# Patient Record
Sex: MALE | Race: WHITE | Employment: OTHER | RURAL
[De-identification: names, ages, dates, MRNs, and addresses within clinical notes are randomized per-mention and may not be internally consistent; named-entity substitution may affect disease eponyms.]

---

## 2019-12-11 ENCOUNTER — OFFICE VISIT (OUTPATIENT)
Dept: FAMILY MEDICINE CLINIC | Age: 54
End: 2019-12-11

## 2019-12-11 VITALS
HEIGHT: 63 IN | SYSTOLIC BLOOD PRESSURE: 144 MMHG | BODY MASS INDEX: 28.49 KG/M2 | DIASTOLIC BLOOD PRESSURE: 78 MMHG | HEART RATE: 77 BPM | WEIGHT: 160.8 LBS | RESPIRATION RATE: 20 BRPM | TEMPERATURE: 98.6 F | OXYGEN SATURATION: 98 %

## 2019-12-11 DIAGNOSIS — I10 ESSENTIAL HYPERTENSION: ICD-10-CM

## 2019-12-11 DIAGNOSIS — E78.00 HYPERCHOLESTEROLEMIA: ICD-10-CM

## 2019-12-11 DIAGNOSIS — H69.81 DYSFUNCTION OF RIGHT EUSTACHIAN TUBE: ICD-10-CM

## 2019-12-11 DIAGNOSIS — I25.10 CORONARY ARTERY DISEASE INVOLVING NATIVE CORONARY ARTERY OF NATIVE HEART, ANGINA PRESENCE UNSPECIFIED: ICD-10-CM

## 2019-12-11 DIAGNOSIS — E11.69 TYPE 2 DIABETES MELLITUS WITH OTHER SPECIFIED COMPLICATION, WITHOUT LONG-TERM CURRENT USE OF INSULIN (HCC): Primary | ICD-10-CM

## 2019-12-11 DIAGNOSIS — Z79.899 ENCOUNTER FOR LONG-TERM CURRENT USE OF MEDICATION: ICD-10-CM

## 2019-12-11 DIAGNOSIS — Z12.11 SCREEN FOR COLON CANCER: ICD-10-CM

## 2019-12-11 DIAGNOSIS — Z11.59 ENCOUNTER FOR HEPATITIS C SCREENING TEST FOR LOW RISK PATIENT: ICD-10-CM

## 2019-12-11 PROBLEM — E11.9 DIABETES (HCC): Status: ACTIVE | Noted: 2019-12-11

## 2019-12-11 RX ORDER — ISOSORBIDE MONONITRATE 30 MG/1
TABLET, EXTENDED RELEASE ORAL
Qty: 90 TAB | Refills: 1 | Status: SHIPPED | OUTPATIENT
Start: 2019-12-11 | End: 2020-06-17

## 2019-12-11 RX ORDER — GLIPIZIDE 5 MG/1
TABLET, FILM COATED, EXTENDED RELEASE ORAL
Refills: 0 | COMMUNITY
Start: 2019-09-16 | End: 2019-12-11 | Stop reason: SDUPTHER

## 2019-12-11 RX ORDER — SIMVASTATIN 80 MG/1
TABLET, FILM COATED ORAL
Refills: 0 | COMMUNITY
Start: 2019-09-16 | End: 2019-12-11 | Stop reason: SDUPTHER

## 2019-12-11 RX ORDER — GLIPIZIDE 5 MG/1
TABLET, FILM COATED, EXTENDED RELEASE ORAL
Qty: 360 TAB | Refills: 1 | Status: SHIPPED | OUTPATIENT
Start: 2019-12-11 | End: 2020-07-29 | Stop reason: SDUPTHER

## 2019-12-11 RX ORDER — CLOPIDOGREL BISULFATE 75 MG/1
TABLET ORAL
Qty: 90 TAB | Refills: 3 | Status: SHIPPED | OUTPATIENT
Start: 2019-12-11 | End: 2020-12-21

## 2019-12-11 RX ORDER — ASPIRIN 81 MG/1
TABLET ORAL DAILY
COMMUNITY
End: 2022-04-21 | Stop reason: SDUPTHER

## 2019-12-11 RX ORDER — METOPROLOL SUCCINATE 50 MG/1
TABLET, EXTENDED RELEASE ORAL
Refills: 0 | COMMUNITY
Start: 2019-11-12 | End: 2020-02-10 | Stop reason: SDUPTHER

## 2019-12-11 RX ORDER — METFORMIN HYDROCHLORIDE 500 MG/1
TABLET ORAL
Refills: 0 | COMMUNITY
Start: 2019-09-16 | End: 2019-12-11 | Stop reason: SDUPTHER

## 2019-12-11 RX ORDER — BISMUTH SUBSALICYLATE 262 MG
1 TABLET,CHEWABLE ORAL DAILY
COMMUNITY

## 2019-12-11 RX ORDER — LOSARTAN POTASSIUM AND HYDROCHLOROTHIAZIDE 25; 100 MG/1; MG/1
TABLET ORAL
Refills: 0 | COMMUNITY
Start: 2019-09-16 | End: 2019-12-11 | Stop reason: SDUPTHER

## 2019-12-11 RX ORDER — CLOPIDOGREL BISULFATE 75 MG/1
TABLET ORAL
Refills: 0 | COMMUNITY
Start: 2019-09-19 | End: 2019-12-11 | Stop reason: SDUPTHER

## 2019-12-11 RX ORDER — LOSARTAN POTASSIUM AND HYDROCHLOROTHIAZIDE 25; 100 MG/1; MG/1
TABLET ORAL
Qty: 90 TAB | Refills: 1 | Status: SHIPPED | OUTPATIENT
Start: 2019-12-11 | End: 2020-03-11 | Stop reason: RX

## 2019-12-11 RX ORDER — METFORMIN HYDROCHLORIDE 500 MG/1
TABLET ORAL
Qty: 360 TAB | Refills: 1 | Status: SHIPPED | OUTPATIENT
Start: 2019-12-11 | End: 2020-07-29 | Stop reason: SDUPTHER

## 2019-12-11 RX ORDER — ISOSORBIDE MONONITRATE 30 MG/1
TABLET, EXTENDED RELEASE ORAL
Refills: 0 | COMMUNITY
Start: 2019-09-16 | End: 2019-12-11 | Stop reason: SDUPTHER

## 2019-12-11 RX ORDER — SIMVASTATIN 40 MG/1
40 TABLET, FILM COATED ORAL
Qty: 90 TAB | Refills: 1 | Status: SHIPPED | OUTPATIENT
Start: 2019-12-11 | End: 2020-01-14

## 2019-12-11 NOTE — PROGRESS NOTES
Subjective:     Wei Yang is a 62 y.o. male NEW PATIENT seen for follow up of diabetes. Moved from [facility]. Retired from [employer]. He also has hypertension, hyperlipidemia, coronary artery disease, obesity and excess alcohol use. Diabetic Review of Systems - medication compliance: compliant most of the time, diabetic diet compliance: compliant most of the time, last eye exam approximately March 2019. Other symptoms and concerns: he is due for labs and med refills. His previous PCP recommended trial Tradjenta but pt is not currently taking due to high cost.  He occasional episodes CP. Had heart cath done in May. Coronary lesions too small to put stent in. Cardiology increased dose of Metoprolol instead. He needs to establish with Cardiology here. Patient Active Problem List    Diagnosis Date Noted    Diabetes ([facility] [address] 12/11/2019    Hypertension 12/11/2019    Hypercholesterolemia 12/11/2019    Coronary artery disease involving native coronary artery of native heart 12/11/2019     Current Outpatient Medications   Medication Sig Dispense Refill    metoprolol succinate (TOPROL-XL) 50 mg XL tablet TAKE 1 AND 1/2 TABLETS BY MOUTH TWICE A DAY  0    aspirin delayed-release 81 mg tablet Take  by mouth daily.  fish oil-omega-3 fatty acids (FISH OIL) 340-1,000 mg capsule Take 1 Cap by mouth daily.  multivitamin (ONE A DAY) tablet Take 1 Tab by mouth daily.  losartan-hydroCHLOROthiazide (HYZAAR) 100-25 mg per tablet TAKE 1 TABLET BY MOUTH EVERY DAY IN THE MORNING  Indications: high blood pressure 90 Tab 1    metFORMIN (GLUCOPHAGE) 500 mg tablet TAKE 2 TABLET BY MOUTH 2 TIMES EVERY DAY WITH MORNING AND EVENING MEALS 360 Tab 1    glipiZIDE SR (GLUCOTROL XL) 5 mg CR tablet TAKE 2 TABLET BY MOUTH 2 TIMES EVERY DAY WITH BREAKFAST AND DINNER  Indications: type 2 diabetes mellitus 360 Tab 1    simvastatin (ZOCOR) 40 mg tablet Take 1 Tab by mouth nightly.  Indications: high cholesterol 90 Tab 1    clopidogrel (PLAVIX) 75 mg tab TAKE 1 TABLET BY MOUTH EVERY DAY  Indications: treatment to prevent a heart attack 90 Tab 3    isosorbide mononitrate ER (IMDUR) 30 mg tablet TAKE 1 TABLET BY MOUTH EVERY DAY IN THE MORNING 90 Tab 1    linaGLIPtin (TRADJENTA) 5 mg tablet Take 1 Tab by mouth daily. Indications: type 2 diabetes mellitus 90 Tab 1     Allergies   Allergen Reactions    Lisinopril Cough     Past Medical History:   Diagnosis Date    Diabetes ([address]     Hypercholesterolemia     Hypertension      Past Surgical History:   Procedure Laterality Date    CABG, ARTERY-VEIN, THREE  2013    HX CORONARY ARTERY BYPASS GRAFT  08/28/2013    HX HEART CATHETERIZATION  05/2019    HX HEENT Left 1990's    eye vitrectomy    HX VITRECTOMY Left 1997     Family History   Problem Relation Age of Onset    Diabetes Father     Diabetes Sister      Social History     Tobacco Use    Smoking status: Never Smoker    Smokeless tobacco: Never Used   Substance Use Topics    Alcohol use: Yes     Alcohol/week: 15.0 standard drinks     Types: 15 Cans of beer per week     Frequency: 4 or more times a week     Drinks per session: 3 or 4     Binge frequency: Less than monthly     Comment: beer or liquor             Review of Systems  Pertinent items are noted in HPI. R ear feels clogged. Some rhinitis. Objective:     Visit Vitals  /78 (BP 1 Location: Right arm, BP Patient Position: Sitting) Comment: manual   Pulse 77   Temp 98.6 °F (37 °C) (Oral)   Resp 20   Ht 5' 2.5\" (1.588 m)   Wt 160 lb 12.8 oz (72.9 kg)   SpO2 98%   BMI 28.94 kg/m²     Appearance: alert, well appearing, and in no distress and overweight. Exam:   Physical Exam  Vitals signs reviewed. Constitutional:       Appearance: He is obese. HENT:      Head: Normocephalic.       Right Ear: Tympanic membrane and ear canal normal.      Left Ear: Tympanic membrane and ear canal normal.      Nose: Nose normal.      Mouth/Throat: Mouth: Mucous membranes are moist.      Pharynx: Oropharynx is clear. Eyes:      Extraocular Movements: Extraocular movements intact. Neck:      Musculoskeletal: Normal range of motion. Cardiovascular:      Rate and Rhythm: Normal rate and regular rhythm. Pulmonary:      Effort: Pulmonary effort is normal.      Breath sounds: Normal breath sounds. Abdominal:      Palpations: Abdomen is soft. Tenderness: There is no tenderness. Neurological:      Mental Status: He is alert. Diabetic foot exam:     Left Foot:   Visual Exam: normal    Pulse DP: 2+ (normal)   Filament test: normal sensation          Right Foot:   Visual Exam: normal    Pulse DP: 2+ (normal)   Filament test: normal sensation          Lab review: orders written for new lab studies as appropriate; see orders. Assessment/Plan:     .  Diabetic issues reviewed with him: annual eye examinations at Ophthalmology discussed and glycohemoglobin and other lab monitoring discussed. ICD-10-CM ICD-9-CM    1. Type 2 diabetes mellitus with other specified complication, without long-term current use of insulin (HCC) E11.69 250.80 metFORMIN (GLUCOPHAGE) 500 mg tablet      glipiZIDE SR (GLUCOTROL XL) 5 mg CR tablet      linaGLIPtin (TRADJENTA) 5 mg tablet      HEMOGLOBIN A1C WITH EAG      THYROID CASCADE PROFILE      MICROALBUMIN, UR, RAND W/ MICROALB/CREAT RATIO      HM DIABETES FOOT EXAM   2. Coronary artery disease involving native coronary artery of native heart, angina presence unspecified I25.10 414.01 REFERRAL TO CARDIOLOGY      clopidogrel (PLAVIX) 75 mg tab      isosorbide mononitrate ER (IMDUR) 30 mg tablet   3. Hypercholesterolemia E78.00 272.0 simvastatin (ZOCOR) 40 mg tablet      LIPID PANEL   4. Encounter for long-term current use of medication Z79.899 V58.69 CBC WITH AUTOMATED DIFF      METABOLIC PANEL, COMPREHENSIVE   5.  Screen for colon cancer Z12.11 V76.51 REFERRAL TO GASTROENTEROLOGY   6. Essential hypertension I10 401.9 losartan-hydroCHLOROthiazide (HYZAAR) 100-25 mg per tablet   7. Encounter for hepatitis C screening test for low risk patient Z11.59 V73.89 HEPATITIS C AB   8. Dysfunction of right eustachian tube H69.81 381.81 REFERRAL TO ENT-OTOLARYNGOLOGY     Discussed the patient's BMI with him. The BMI follow up plan is as follows:     dietary management education, guidance, and counseling  encourage exercise  monitor weight  prescribed dietary intake    An After Visit Summary was printed and given to the patient. Request medical records from last PCP. He will return for fasting labs. Refer to Cardiology, GI, ENT. Med refills ordered. If A1C high, consider Victoza or add basal insulin. Discuss decrease alcohol intake. Recommend to get Pneumovax 23.

## 2019-12-11 NOTE — PROGRESS NOTES
Identified pt with two pt identifiers(name and ). Chief Complaint   Patient presents with    New Patient     just moved here from [facility] Maintenance Due   Topic    Hepatitis C Screening     DTaP/Tdap/Td series (1 - Tdap)    Shingrix Vaccine Age 50> (1 of 2)    FOBT Q 1 YEAR AGE 50-75        Wt Readings from Last 3 Encounters:   19 160 lb 12.8 oz (72.9 kg)     Temp Readings from Last 3 Encounters:   19 98.6 °F (37 °C) (Oral)     BP Readings from Last 3 Encounters:   19 144/78     Pulse Readings from Last 3 Encounters:   19 77         Learning Assessment:  :     Learning Assessment 2019   PRIMARY LEARNER Patient   HIGHEST LEVEL OF EDUCATION - PRIMARY LEARNER  GRADUATED HIGH SCHOOL OR GED   BARRIERS PRIMARY LEARNER NONE   CO-LEARNER CAREGIVER No   PRIMARY LANGUAGE ENGLISH   LEARNER PREFERENCE PRIMARY READING     DEMONSTRATION   ANSWERED BY self   RELATIONSHIP SELF       Depression Screening:  :     3 most recent PHQ Screens 2019   Little interest or pleasure in doing things Not at all   Feeling down, depressed, irritable, or hopeless Not at all   Total Score PHQ 2 0       Fall Risk Assessment:  :     No flowsheet data found. Abuse Screening:  :     Abuse Screening Questionnaire 2019   Do you ever feel afraid of your partner? N   Are you in a relationship with someone who physically or mentally threatens you? N   Is it safe for you to go home? Y       Coordination of Care Questionnaire:  :     1) Have you been to an emergency room, urgent care clinic since your last visit? no   Hospitalized since your last visit? no             2) Have you seen or consulted any other health care providers outside of [address] [place] [address] since your last visit? yes  19 Dr Glo Morin (Include any pap smears or colon screenings in this section.)    3) Do you have an Advance Directive on file?  no  Are you interested in receiving information about Advance Directives? Yes paper work given and explained    Reviewed record in preparation for visit and have obtained necessary documentation. Medication reconciliation up to date and corrected with patient at this time.

## 2019-12-11 NOTE — PATIENT INSTRUCTIONS
Body Mass Index: Care Instructions Your Care Instructions Body mass index (BMI) can help you see if your weight is raising your risk for health problems. It uses a formula to compare how much you weigh with how tall you are. · A BMI lower than 18.5 is considered underweight. · A BMI between 18.5 and 24.9 is considered healthy. · A BMI between 25 and 29.9 is considered overweight. A BMI of 30 or higher is considered obese. If your BMI is in the normal range, it means that you have a lower risk for weight-related health problems. If your BMI is in the overweight or obese range, you may be at increased risk for weight-related health problems, such as high blood pressure, heart disease, stroke, arthritis or joint pain, and diabetes. If your BMI is in the underweight range, you may be at increased risk for health problems such as fatigue, lower protection (immunity) against illness, muscle loss, bone loss, hair loss, and hormone problems. BMI is just one measure of your risk for weight-related health problems. You may be at higher risk for health problems if you are not active, you eat an unhealthy diet, or you drink too much alcohol or use tobacco products. Follow-up care is a key part of your treatment and safety. Be sure to make and go to all appointments, and call your doctor if you are having problems. It's also a good idea to know your test results and keep a list of the medicines you take. How can you care for yourself at home? · Practice healthy eating habits. This includes eating plenty of fruits, vegetables, whole grains, lean protein, and low-fat dairy. · If your doctor recommends it, get more exercise. Walking is a good choice. Bit by bit, increase the amount you walk every day. Try for at least 30 minutes on most days of the week. · Do not smoke. Smoking can increase your risk for health problems.  If you need help quitting, talk to your doctor about stop-smoking programs and medicines. These can increase your chances of quitting for good. · Limit alcohol to 2 drinks a day for men and 1 drink a day for women. Too much alcohol can cause health problems. If you have a BMI higher than 25 · Your doctor may do other tests to check your risk for weight-related health problems. This may include measuring the distance around your waist. A waist measurement of more than 40 inches in men or 35 inches in women can increase the risk of weight-related health problems. · Talk with your doctor about steps you can take to stay healthy or improve your health. You may need to make lifestyle changes to lose weight and stay healthy, such as changing your diet and getting regular exercise. If you have a BMI lower than 18.5 · Your doctor may do other tests to check your risk for health problems. · Talk with your doctor about steps you can take to stay healthy or improve your health. You may need to make lifestyle changes to gain or maintain weight and stay healthy, such as getting more healthy foods in your diet and doing exercises to build muscle. Where can you learn more? Go to http://carolina-dionna.info/. Enter S176 in the search box to learn more about \"Body Mass Index: Care Instructions. \" Current as of: October 13, 2016 Content Version: 11.4 © 3264-3808 [employer], [employer]. Care instructions adapted under license by "[employer] (which disclaims liability or warranty for this information). If you have questions about a medical condition or this instruction, always ask your healthcare professional. [place] [address] any warranty or liability for your use of this information. Colon Cancer Screening: Care Instructions Your Care Instructions Colorectal cancer occurs in the colon or rectum. That's the lower part of your digestive system.  It is the second-leading cause of cancer deaths in the [place]. It often starts with small growths called polyps in the colon or rectum. Polyps are usually found with screening tests. Depending on the type of test, any polyps found may be removed during the tests. Colorectal cancer usually does not cause symptoms at first. But regular tests can help find it early, before it spreads and becomes harder to treat. Your risk for colorectal cancer gets higher as you get older. Some experts say that adults should start regular screening at age 48 and stop at age 76. Others say to start before age 48 or continue after age 76. Talk with your doctor about your risk and when to start and stop screening. You may have one of several tests. Follow-up care is a key part of your treatment and safety. Be sure to make and go to all appointments, and call your doctor if you are having problems. It's also a good idea to know your test results and keep a list of the medicines you take. What are the main screening tests for colon cancer? · Stool tests. These include the fecal immunochemical test (FIT) and the fecal occult blood test (FOBT). These tests check stool samples for signs of cancer. If your test is positive, you will need to have a colonoscopy. · Sigmoidoscopy. This test lets your doctor look at the lining of your rectum and the lowest part of your colon. Your doctor uses a lighted tube called a sigmoidoscope. This test can't find cancers or polyps in the upper part of your colon. In some cases, polyps that are found can be removed. But if your doctor finds polyps, you will need to have a colonoscopy to check the upper part of your colon. · Colonoscopy. This test lets your doctor look at the lining of your rectum and your entire colon. The doctor uses a thin, flexible tool called a colonoscope. It can also be used to remove polyps or get a tissue sample (biopsy). What tests do you need?  
The following guidelines are for adults who are not at high risk for colorectal cancer. You may have at least one of these tests as directed by your doctor. · Fecal immunochemical test (FIT) or guaiac fecal occult blood test (gFOBT) every year · Sigmoidoscopy every 5 years · Colonoscopy every 10 years If you are over age 76, you can work with your doctor to decide if screening is a good option. Talk with your doctor about when you need to be tested. And discuss which tests are right for you. Your doctor may recommend earlier or more frequent testing if you: 
· Have had colorectal cancer before. · Have had colon polyps. · Have symptoms of colorectal cancer. These include blood in your stool and changes in your bowel habits. · Have a parent, brother or sister, or child with colon polyps or colorectal cancer. · Have a bowel disease. This includes ulcerative colitis and Crohn's disease. · Have a rare polyp syndrome that runs in families, such as familial adenomatous polyposis (FAP). · Have had radiation treatments to the belly or pelvis. When should you call for help? Watch closely for changes in your health, and be sure to contact your doctor if: 
  · You have any changes in your bowel habits.  
  · You have any problems. Where can you learn more? Go to http://carolina-dionna.info/. Enter M541 in the search box to learn more about \"Colon Cancer Screening: Care Instructions. \" Current as of: December 19, 2018 Content Version: 12.2 © 7836-1922 [employer]. Care instructions adapted under license by [employer] (which disclaims liability or warranty for this information). If you have questions about a medical condition or this instruction, always ask your healthcare professional. [place] [address] any warranty or liability for your use of this information.

## 2019-12-11 NOTE — ASSESSMENT & PLAN NOTE
Type 2. Key Antihyperglycemic Medications             metFORMIN (GLUCOPHAGE) 500 mg tablet (Taking) TAKE 2 TABLET BY MOUTH 2 TIMES EVERY DAY WITH MORNING AND EVENING MEALS    glipiZIDE SR (GLUCOTROL XL) 5 mg CR tablet (Taking) TAKE 2 TABLET BY MOUTH 2 TIMES EVERY DAY WITH BREAKFAST AND DINNER        Other Key Diabetic Medications             losartan-hydroCHLOROthiazide (HYZAAR) 100-25 mg per tablet (Taking) TAKE 1 TABLET BY MOUTH EVERY DAY IN THE MORNING    simvastatin (ZOCOR) 80 mg tablet (Taking) TAKE 1/2 TABLET BY ORAL ROUTE EVERY DAY IN THE EVENING    fish oil-omega-3 fatty acids (FISH OIL) 340-1,000 mg capsule (Taking) Take 1 Cap by mouth daily.         No results found for: HBA1C, [id number], [id number], GLU, CREA, CREAPOC, CREATEXT, MALBUR, MCALPOCT, MCACRPOC, MALBCRRATEXT, MCACR, MCAU, MCAU2, MALBEXT, CHOL, CHOLPOCT, HDL, HDLPOC, LDLC, LDL, LDLCEXT, LDLCPOC, TRIGL, TGLPOCT, [id number]  No Diabetic HM Topics for this patient

## 2019-12-18 DIAGNOSIS — I10 ESSENTIAL HYPERTENSION: Primary | ICD-10-CM

## 2019-12-18 RX ORDER — HYDROCHLOROTHIAZIDE 25 MG/1
25 TABLET ORAL DAILY
Qty: 90 TAB | Refills: 0 | Status: SHIPPED | OUTPATIENT
Start: 2019-12-18 | End: 2020-03-11

## 2019-12-18 RX ORDER — LOSARTAN POTASSIUM 100 MG/1
100 TABLET ORAL DAILY
Qty: 90 TAB | Refills: 0 | Status: SHIPPED | OUTPATIENT
Start: 2019-12-18 | End: 2020-03-11

## 2019-12-19 ENCOUNTER — LAB ONLY (OUTPATIENT)
Dept: FAMILY MEDICINE CLINIC | Age: 54
End: 2019-12-19

## 2019-12-19 ENCOUNTER — HOSPITAL ENCOUNTER (OUTPATIENT)
Dept: LAB | Age: 54
Discharge: HOME | End: 2019-12-19

## 2019-12-19 DIAGNOSIS — Z79.899 ENCOUNTER FOR LONG-TERM CURRENT USE OF MEDICATION: ICD-10-CM

## 2019-12-19 DIAGNOSIS — E78.00 HYPERCHOLESTEROLEMIA: ICD-10-CM

## 2019-12-19 DIAGNOSIS — Z11.59 ENCOUNTER FOR HEPATITIS C SCREENING TEST FOR LOW RISK PATIENT: ICD-10-CM

## 2019-12-19 DIAGNOSIS — E11.69 TYPE 2 DIABETES MELLITUS WITH OTHER SPECIFIED COMPLICATION, WITHOUT LONG-TERM CURRENT USE OF INSULIN (HCC): ICD-10-CM

## 2019-12-19 LAB
ALBUMIN SERPL-MCNC: 3.8 G/DL (ref 3.5–5)
ALBUMIN/GLOB SERPL: 1.5 {RATIO} (ref 1.1–2.2)
ALP SERPL-CCNC: 66 U/L (ref 45–117)
ALT SERPL-CCNC: 22 U/L (ref 12–78)
ANION GAP SERPL CALC-SCNC: 6 MMOL/L (ref 5–15)
AST SERPL-CCNC: 10 U/L (ref 15–37)
BASOPHILS # BLD: 0 K/UL (ref 0–0.1)
BASOPHILS NFR BLD: 1 % (ref 0–1)
BILIRUB SERPL-MCNC: 0.8 MG/DL (ref 0.2–1)
BUN SERPL-MCNC: 21 MG/DL (ref 6–20)
BUN/CREAT SERPL: 22 (ref 12–20)
CALCIUM SERPL-MCNC: 9.7 MG/DL (ref 8.5–10.1)
CHLORIDE SERPL-SCNC: 104 MMOL/L (ref 97–108)
CHOLEST SERPL-MCNC: 160 MG/DL
CO2 SERPL-SCNC: 29 MMOL/L (ref 21–32)
CREAT SERPL-MCNC: 0.97 MG/DL (ref 0.7–1.3)
CREAT UR-MCNC: 224 MG/DL
DIFFERENTIAL METHOD BLD: ABNORMAL
EOSINOPHIL # BLD: 0.1 K/UL (ref 0–0.4)
EOSINOPHIL NFR BLD: 3 % (ref 0–7)
ERYTHROCYTE [DISTWIDTH] IN BLOOD BY AUTOMATED COUNT: 14.5 % (ref 11.5–14.5)
EST. AVERAGE GLUCOSE BLD GHB EST-MCNC: 154 MG/DL
GLOBULIN SER CALC-MCNC: 2.6 G/DL (ref 2–4)
GLUCOSE SERPL-MCNC: 181 MG/DL (ref 65–100)
HBA1C MFR BLD: 7 % (ref 4–5.6)
HCT VFR BLD AUTO: 39.8 % (ref 36.6–50.3)
HDLC SERPL-MCNC: 52 MG/DL
HDLC SERPL: 3.1 {RATIO} (ref 0–5)
HGB BLD-MCNC: 13.2 G/DL (ref 12.1–17)
IMM GRANULOCYTES # BLD AUTO: 0 K/UL (ref 0–0.04)
IMM GRANULOCYTES NFR BLD AUTO: 0 % (ref 0–0.5)
LDLC SERPL CALC-MCNC: 57.4 MG/DL (ref 0–100)
LIPID PROFILE,FLP: ABNORMAL
LYMPHOCYTES # BLD: 0.9 K/UL (ref 0.8–3.5)
LYMPHOCYTES NFR BLD: 20 % (ref 12–49)
MCH RBC QN AUTO: 30.2 PG (ref 26–34)
MCHC RBC AUTO-ENTMCNC: 33.2 G/DL (ref 30–36.5)
MCV RBC AUTO: 91.1 FL (ref 80–99)
MICROALBUMIN UR-MCNC: 436 MG/DL
MICROALBUMIN/CREAT UR-RTO: 1946 MG/G (ref 0–30)
MONOCYTES # BLD: 0.4 K/UL (ref 0–1)
MONOCYTES NFR BLD: 9 % (ref 5–13)
NEUTS SEG # BLD: 3.1 K/UL (ref 1.8–8)
NEUTS SEG NFR BLD: 67 % (ref 32–75)
NRBC # BLD: 0 K/UL (ref 0–0.01)
NRBC BLD-RTO: 0 PER 100 WBC
PLATELET # BLD AUTO: 130 K/UL (ref 150–400)
PMV BLD AUTO: 12.5 FL (ref 8.9–12.9)
POTASSIUM SERPL-SCNC: 4.6 MMOL/L (ref 3.5–5.1)
PROT SERPL-MCNC: 6.4 G/DL (ref 6.4–8.2)
RBC # BLD AUTO: 4.37 M/UL (ref 4.1–5.7)
SODIUM SERPL-SCNC: 139 MMOL/L (ref 136–145)
TRIGL SERPL-MCNC: 253 MG/DL (ref ?–150)
VLDLC SERPL CALC-MCNC: 50.6 MG/DL
WBC # BLD AUTO: 4.6 K/UL (ref 4.1–11.1)

## 2019-12-20 LAB
HCV AB SERPL QL IA: NONREACTIVE
HCV COMMENT,HCGAC: NORMAL

## 2019-12-21 LAB — TSH SERPL-ACNC: 2.81 UIU/ML (ref 0.45–4.5)

## 2019-12-27 ENCOUNTER — TELEPHONE (OUTPATIENT)
Dept: FAMILY MEDICINE CLINIC | Age: 54
End: 2019-12-27

## 2020-01-14 ENCOUNTER — OFFICE VISIT (OUTPATIENT)
Dept: CARDIOLOGY CLINIC | Age: 55
End: 2020-01-14

## 2020-01-14 VITALS
OXYGEN SATURATION: 98 % | RESPIRATION RATE: 16 BRPM | HEIGHT: 62 IN | SYSTOLIC BLOOD PRESSURE: 144 MMHG | WEIGHT: 165 LBS | BODY MASS INDEX: 30.36 KG/M2 | DIASTOLIC BLOOD PRESSURE: 88 MMHG | HEART RATE: 84 BPM

## 2020-01-14 DIAGNOSIS — I10 ESSENTIAL HYPERTENSION: ICD-10-CM

## 2020-01-14 DIAGNOSIS — I25.10 CORONARY ARTERY DISEASE INVOLVING NATIVE CORONARY ARTERY OF NATIVE HEART WITHOUT ANGINA PECTORIS: Primary | ICD-10-CM

## 2020-01-14 DIAGNOSIS — E78.00 HYPERCHOLESTEROLEMIA: ICD-10-CM

## 2020-01-14 RX ORDER — ROSUVASTATIN CALCIUM 20 MG/1
20 TABLET, COATED ORAL
Qty: 90 TAB | Refills: 3 | Status: SHIPPED | OUTPATIENT
Start: 2020-01-14 | End: 2021-01-29 | Stop reason: SDUPTHER

## 2020-01-14 NOTE — PROGRESS NOTES
Chief Complaint   Patient presents with    Other     cardia clearance     Visit Vitals  /88 (BP 1 Location: Left arm, BP Patient Position: Sitting)   Pulse 84   Resp 16   Ht 5' 2\" (1.575 m)   Wt 165 lb (74.8 kg)   SpO2 98%   BMI 30.18 kg/m²     Patient presents to office with no complaints of pain, SOB, dizziness, or chest pain. No refills needed. No recent hospitalizations reported. Patient here for cardia clearance for colonoscopy.    Peg Turner LPN

## 2020-01-14 NOTE — PROGRESS NOTES
Korina Ferrara MD. [facility]              Patient: Julia Ramires  : 1965      Today's Date: 2020              HISTORY OF PRESENT ILLNESS:     History of Present Illness:  Reason for consult: establish cardiac relationship      Dr. Klarissa Perdue noted 19 - He occasional episodes CP. Had heart cath done in May. Coronary lesions too small to put stent in. Cardiology increased dose of Metoprolol instead. He needs to establish with Cardiology here. He says he has history of CABG in . He saw Dr. Jerel Nicholas in [facility] - had a nuclear stress test and then a cath - had some small vessel blockages and treated medically. Chronic stable angina - class II - notices walking up hills sometimes. He has been stable cardiac wise. He has plans for colonoscopy soon. PAST MEDICAL HISTORY:     Past Medical History:   Diagnosis Date    CAD (coronary artery disease)     Diabetes ([address]     Hx of CABG     Aug 2013 at [place] in [place]     Hypercholesterolemia     Hypertension        Past Surgical History:   Procedure Laterality Date    CABG, ARTERY-VEIN, THREE      HX CORONARY ARTERY BYPASS GRAFT  2013    HX HEART CATHETERIZATION  2019    HX HEENT Left 's    eye vitrectomy    HX VITRECTOMY Left          MEDICATIONS:     Current Outpatient Medications   Medication Sig Dispense Refill    metoprolol succinate (TOPROL-XL) 50 mg XL tablet TAKE 1 AND 1/2 TABLETS BY MOUTH TWICE A DAY  0    aspirin delayed-release 81 mg tablet Take  by mouth daily.  fish oil-omega-3 fatty acids (FISH OIL) 340-1,000 mg capsule Take 1 Cap by mouth daily.  multivitamin (ONE A DAY) tablet Take 1 Tab by mouth daily.       losartan-hydroCHLOROthiazide (HYZAAR) 100-25 mg per tablet TAKE 1 TABLET BY MOUTH EVERY DAY IN THE MORNING  Indications: high blood pressure 90 Tab 1    metFORMIN (GLUCOPHAGE) 500 mg tablet TAKE 2 TABLET BY MOUTH 2 TIMES EVERY DAY WITH MORNING AND EVENING MEALS 360 Tab 1    glipiZIDE SR (GLUCOTROL XL) 5 mg CR tablet TAKE 2 TABLET BY MOUTH 2 TIMES EVERY DAY WITH BREAKFAST AND DINNER  Indications: type 2 diabetes mellitus 360 Tab 1    simvastatin (ZOCOR) 40 mg tablet Take 1 Tab by mouth nightly. Indications: high cholesterol 90 Tab 1    clopidogrel (PLAVIX) 75 mg tab TAKE 1 TABLET BY MOUTH EVERY DAY  Indications: treatment to prevent a heart attack 90 Tab 3    isosorbide mononitrate ER (IMDUR) 30 mg tablet TAKE 1 TABLET BY MOUTH EVERY DAY IN THE MORNING 90 Tab 1    losartan (COZAAR) 100 mg tablet Take 1 Tab by mouth daily. 90 Tab 0    hydroCHLOROthiazide (HYDRODIURIL) 25 mg tablet Take 1 Tab by mouth daily. 90 Tab 0    linaGLIPtin (TRADJENTA) 5 mg tablet Take 1 Tab by mouth daily. Indications: type 2 diabetes mellitus 90 Tab 1       Allergies   Allergen Reactions    Lisinopril Cough           SOCIAL HISTORY:     Social History     Tobacco Use    Smoking status: Never Smoker    Smokeless tobacco: Never Used   Substance Use Topics    Alcohol use: Yes     Alcohol/week: 15.0 standard drinks     Types: 15 Cans of beer per week     Frequency: 4 or more times a week     Drinks per session: 3 or 4     Binge frequency: Less than monthly     Comment: beer or liquor    Drug use: Never         FAMILY HISTORY:     Family History   Problem Relation Age of Onset    Diabetes Father     Diabetes Sister            REVIEW OF SYMPTOMS:     Review of Symptoms:  Constitutional: Negative for fever, chills  HEENT: Negative for nosebleeds, tinnitus, and vision changes. Respiratory: Negative for cough, wheezing  Cardiovascular: Negative for orthopnea, claudication, syncope, and PND. Gastrointestinal: Negative for abdominal pain, diarrhea, melena. Genitourinary: Negative for dysuria  Musculoskeletal: Negative for myalgias. Skin: Negative for rash  Heme: No problems bleeding. Neurological: Negative for speech change and focal weakness.          PHYSICAL EXAM:     Physical Exam:  Visit Vitals  /88 (BP 1 Location: Left arm, BP Patient Position: Sitting)   Pulse 84   Resp 16   Ht 5' 2\" (1.575 m)   Wt 165 lb (74.8 kg)   SpO2 98%   BMI 30.18 kg/m²     Patient appears generally well, mood and affect are appropriate and pleasant. HEENT:  Hearing intact, non-icteric, normocephalic, atraumatic. Neck Exam: Supple, No JVD or carotid bruits. Lung Exam: Clear to auscultation, even breath sounds. Cardiac Exam: Regular rate and rhythm with no murmur or rub  Abdomen: Soft, non-tender, normal bowel sounds. No bruits or masses. Extremities: Moves all ext well. No lower extremity edema. Vascular: 2+ dorsalis pedis pulses bilaterally. Psych: Appropriate affect  Neuro - Grossly intact      LABS / OTHER STUDIES:     Lab Results   Component Value Date/Time    Cholesterol, total 160 12/19/2019 10:54 AM    HDL Cholesterol 52 12/19/2019 10:54 AM    LDL, calculated 57.4 12/19/2019 10:54 AM    VLDL, calculated 50.6 12/19/2019 10:54 AM    Triglyceride 253 (H) 12/19/2019 10:54 AM    CHOL/HDL Ratio 3.1 12/19/2019 10:54 AM       Lab Results   Component Value Date/Time    Sodium 139 12/19/2019 10:54 AM    Potassium 4.6 12/19/2019 10:54 AM    Chloride 104 12/19/2019 10:54 AM    CO2 29 12/19/2019 10:54 AM    Anion gap 6 12/19/2019 10:54 AM    Glucose 181 (H) 12/19/2019 10:54 AM    BUN 21 (H) 12/19/2019 10:54 AM    Creatinine 0.97 12/19/2019 10:54 AM    BUN/Creatinine ratio 22 (H) 12/19/2019 10:54 AM    GFR est AA >60 12/19/2019 10:54 AM    GFR est non-AA >60 12/19/2019 10:54 AM    Calcium 9.7 12/19/2019 10:54 AM    Bilirubin, total 0.8 12/19/2019 10:54 AM    AST (SGOT) 10 (L) 12/19/2019 10:54 AM    Alk.  phosphatase 66 12/19/2019 10:54 AM    Protein, total 6.4 12/19/2019 10:54 AM    Albumin 3.8 12/19/2019 10:54 AM    Globulin 2.6 12/19/2019 10:54 AM    A-G Ratio 1.5 12/19/2019 10:54 AM    ALT (SGPT) 22 12/19/2019 10:54 AM     Lab Results   Component Value Date/Time    WBC 4.6 12/19/2019 10:54 AM    HGB 13.2 12/19/2019 10:54 AM    HCT 39.8 12/19/2019 10:54 AM    PLATELET 773 (L) 86/31/4723 10:54 AM    MCV 91.1 12/19/2019 10:54 AM     Lab Results   Component Value Date/Time    TSH 2.810 12/19/2019 10:54 AM     Lab Results   Component Value Date/Time    Hemoglobin A1c 7.0 (H) 12/19/2019 10:54 AM           CARDIAC DIAGNOSTICS:     Cardiac Evaluation Includes:    EKG 1/14/20 - NSR, normal         ASSESSMENT AND PLAN:     Assessment and Plan:    1) CAD / CABG  - He has history of CABG in 2013. He saw Dr. Desirae Servin in [address] in 2019 - says he had a nuclear stress test and then a cath - had some small vessel blockages and treated medically. - has done well without signficant chest pain lately - has chronic class 2 angina (pain walking up hills sometimes)   - Cont BB, statin, Imdur, ARB  - He is on DAPT - plavix started in May 2019 --> will get prior records and then likely stop plavix at next visit in 6 months     2) HTN   - BP mildly high in visits   - [place] < 130/80   - He prefers to follow BP at home and FU with Dr. Dickens Danger -- can add amlodipine if BP is high     3) Dyslipidemia  - prefer a more potent statin ---> switch Simvastatin to Crestor 20 mg daily     4) Colonoscopy   - He can proceed with procedure and should have low cardiac risk - can hold plavix 5 days priror to procedure if needed     5) See me in 6 months. Patient expressed understanding of the plan - questions were answered. Moved from [place] to [place] in 1984. Retired from [employer] 2019. Family in [facility] [place]. Single. [place]ia Benítez MD, [address], [facility] [address]. [address], [address]  Ph: [phone]   Ph [phone]      ADDENDUM   1/30/2020  Fax received from [facility] for clearance. Procedure: Colonoscopy/EGD  Dr: Darien Montemayor ?   Medication requested to hold: Plavix      He can proceed with his procedure and should have low cardiac risk. He can hold plavix 5 days prior to procedure and resume when safe.

## 2020-01-30 ENCOUNTER — TELEPHONE (OUTPATIENT)
Dept: CARDIOLOGY CLINIC | Age: 55
End: 2020-01-30

## 2020-01-30 NOTE — TELEPHONE ENCOUNTER
Fax received from [facility] for clearance. Procedure: Colonoscopy/EGD  Dr: Linda Perla ?   Medication requested to hold: Plavix    Fax to: [phone]

## 2020-02-10 RX ORDER — METOPROLOL SUCCINATE 50 MG/1
TABLET, EXTENDED RELEASE ORAL
Qty: 90 TAB | Refills: 5 | Status: SHIPPED | OUTPATIENT
Start: 2020-02-10 | End: 2020-07-29 | Stop reason: SDUPTHER

## 2020-03-11 DIAGNOSIS — I10 ESSENTIAL HYPERTENSION: ICD-10-CM

## 2020-03-11 RX ORDER — LOSARTAN POTASSIUM 100 MG/1
TABLET ORAL
Qty: 90 TAB | Refills: 0 | Status: SHIPPED | OUTPATIENT
Start: 2020-03-11 | End: 2020-07-29 | Stop reason: ALTCHOICE

## 2020-03-11 RX ORDER — HYDROCHLOROTHIAZIDE 25 MG/1
TABLET ORAL
Qty: 90 TAB | Refills: 0 | Status: SHIPPED | OUTPATIENT
Start: 2020-03-11 | End: 2020-07-29 | Stop reason: ALTCHOICE

## 2020-06-17 DIAGNOSIS — I25.10 CORONARY ARTERY DISEASE INVOLVING NATIVE CORONARY ARTERY OF NATIVE HEART, ANGINA PRESENCE UNSPECIFIED: ICD-10-CM

## 2020-06-17 RX ORDER — ISOSORBIDE MONONITRATE 30 MG/1
TABLET, EXTENDED RELEASE ORAL
Qty: 90 TAB | Refills: 1 | Status: SHIPPED | OUTPATIENT
Start: 2020-06-17 | End: 2020-12-21

## 2020-07-29 ENCOUNTER — OFFICE VISIT (OUTPATIENT)
Dept: FAMILY MEDICINE CLINIC | Age: 55
End: 2020-07-29

## 2020-07-29 VITALS
DIASTOLIC BLOOD PRESSURE: 78 MMHG | OXYGEN SATURATION: 97 % | RESPIRATION RATE: 18 BRPM | HEIGHT: 62 IN | BODY MASS INDEX: 30.55 KG/M2 | HEART RATE: 96 BPM | WEIGHT: 166 LBS | TEMPERATURE: 97.4 F | SYSTOLIC BLOOD PRESSURE: 122 MMHG

## 2020-07-29 DIAGNOSIS — E11.69 TYPE 2 DIABETES MELLITUS WITH OTHER SPECIFIED COMPLICATION, WITHOUT LONG-TERM CURRENT USE OF INSULIN (HCC): ICD-10-CM

## 2020-07-29 DIAGNOSIS — Z79.899 ENCOUNTER FOR LONG-TERM CURRENT USE OF MEDICATION: ICD-10-CM

## 2020-07-29 DIAGNOSIS — E11.21 TYPE 2 DIABETES WITH NEPHROPATHY (HCC): Primary | ICD-10-CM

## 2020-07-29 DIAGNOSIS — F10.10 EXCESSIVE DRINKING ALCOHOL: ICD-10-CM

## 2020-07-29 DIAGNOSIS — Z23 ENCOUNTER FOR IMMUNIZATION: ICD-10-CM

## 2020-07-29 DIAGNOSIS — I25.10 CORONARY ARTERY DISEASE INVOLVING NATIVE CORONARY ARTERY OF NATIVE HEART, ANGINA PRESENCE UNSPECIFIED: ICD-10-CM

## 2020-07-29 DIAGNOSIS — I10 ESSENTIAL HYPERTENSION: ICD-10-CM

## 2020-07-29 DIAGNOSIS — E78.00 HYPERCHOLESTEROLEMIA: ICD-10-CM

## 2020-07-29 RX ORDER — LOSARTAN POTASSIUM AND HYDROCHLOROTHIAZIDE 25; 100 MG/1; MG/1
TABLET ORAL
COMMUNITY
Start: 2020-07-19 | End: 2020-10-17

## 2020-07-29 RX ORDER — METFORMIN HYDROCHLORIDE 500 MG/1
TABLET ORAL
Qty: 360 TAB | Refills: 1 | Status: SHIPPED | OUTPATIENT
Start: 2020-07-29 | End: 2021-01-29 | Stop reason: SDUPTHER

## 2020-07-29 RX ORDER — METOPROLOL SUCCINATE 50 MG/1
TABLET, EXTENDED RELEASE ORAL
Qty: 90 TAB | Refills: 5 | Status: SHIPPED | OUTPATIENT
Start: 2020-07-29 | End: 2021-01-29 | Stop reason: SDUPTHER

## 2020-07-29 RX ORDER — GLIPIZIDE 5 MG/1
TABLET, FILM COATED, EXTENDED RELEASE ORAL
Qty: 360 TAB | Refills: 1 | Status: SHIPPED | OUTPATIENT
Start: 2020-07-29 | End: 2021-01-29 | Stop reason: SDUPTHER

## 2020-07-29 NOTE — PROGRESS NOTES
Identified pt with two pt identifiers(name and ). Chief Complaint   Patient presents with    Diabetes        Health Maintenance Due   Topic    Pneumococcal 0-64 years (1 of 1 - PPSV23)    DTaP/Tdap/Td series (1 - Tdap)    Shingrix Vaccine Age 50> (1 of 2)       Wt Readings from Last 3 Encounters:   20 166 lb (75.3 kg)   20 165 lb (74.8 kg)   19 160 lb 12.8 oz (72.9 kg)     Temp Readings from Last 3 Encounters:   20 97.4 °F (36.3 °C) (Oral)   19 98.6 °F (37 °C) (Oral)     BP Readings from Last 3 Encounters:   20 122/78   20 144/88   19 144/78     Pulse Readings from Last 3 Encounters:   20 96   20 84   19 77         Learning Assessment:  :     Learning Assessment 2019   PRIMARY LEARNER Patient   HIGHEST LEVEL OF EDUCATION - PRIMARY LEARNER  GRADUATED HIGH SCHOOL OR GED   BARRIERS PRIMARY LEARNER NONE   CO-LEARNER CAREGIVER No   PRIMARY LANGUAGE ENGLISH   LEARNER PREFERENCE PRIMARY READING     DEMONSTRATION   ANSWERED BY self   RELATIONSHIP SELF       Depression Screening:  :     3 most recent PHQ Screens 2020   Little interest or pleasure in doing things Not at all   Feeling down, depressed, irritable, or hopeless Not at all   Total Score PHQ 2 0       Fall Risk Assessment:  :     No flowsheet data found. Abuse Screening:  :     Abuse Screening Questionnaire 2019   Do you ever feel afraid of your partner? N N   Are you in a relationship with someone who physically or mentally threatens you? N N   Is it safe for you to go home? Y Y       Coordination of Care Questionnaire:  :     1) Have you been to an emergency room, urgent care clinic since your last visit? no   Hospitalized since your last visit? no             2) Have you seen or consulted any other health care providers outside of [address] [place] [address] since your last visit?  yes ENT, Eye dr, Irma Adorno dr w/ colonoscopy- all in media files  (Include any pap smears or colon screenings in this section.)    3) Do you have an Advance Directive on file? no  Are you interested in receiving information about Advance Directives? no      Reviewed record in preparation for visit and have obtained necessary documentation.

## 2020-07-29 NOTE — PATIENT INSTRUCTIONS
Diabetic Nephropathy: Care Instructions Your Care Instructions Finding out that your kidneys have been damaged can be very distressing. It may have taken you by surprise, since damage to kidneys usually does not cause symptoms early on. It is normal to feel upset and afraid. Having diabetic nephropathy means that for some time you have had high blood sugar, which damages the kidneys. Healthy kidneys keep protein in your blood, where it belongs. Damaged kidneys do not work the way they should. Your kidneys are letting protein pass into your urine. Sometimes diabetic kidney disease can lead to kidney failure. Your doctor will tell you how you might be able to slow damage to your kidneys. In many cases, prompt and regular treatment can prevent kidney failure. You will need to take medicine and may need to make a number of changes in your normal routines. If you can keep your blood sugar and blood pressure under control and take certain medicines, you may reduce your chance of kidney failure. Follow-up care is a key part of your treatment and safety. Be sure to make and go to all appointments, and call your doctor if you are having problems. It's also a good idea to know your test results and keep a list of the medicines you take. How can you care for yourself at home? · Take your medicines exactly as prescribed. It is very important that you take your insulin or other diabetes medicine as your doctor tells you. Call your doctor if you think you are having a problem with your medicine. · Try to keep blood sugar in your target range. ? Eat a variety of foods, with carbohydrate spread out in your meals. Your doctor may restrict your protein. A dietitian can help you plan meals. ? If your doctor recommends it, get more exercise. Walking is a good choice. Bit by bit, increase the amount you walk every day. Try for at least 30 minutes on most days of the week. ? Check your blood sugar as often as your doctor recommends. · Take and record your blood pressure at home if your doctor tells you to. Learn the importance of the two measures of blood pressure (such as 130 over 80, or 130/80). To take your blood pressure at home: ? Ask your doctor to check your blood pressure monitor. He or she can make sure that it is accurate and that the cuff fits you. Also ask your doctor to watch you to make sure that you are using it right. ? Do not eat, use tobacco products, or use medicine known to raise blood pressure (such as some nasal decongestant sprays) before taking your blood pressure. ? Avoid taking your blood pressure if you have just exercised or are nervous or upset. Rest at least 15 minutes before taking a reading. · Eat a low-salt diet to help keep your blood pressure in your target range. · Do not smoke. Smoking raises your risk of many health problems, including diabetic nephropathy. If you need help quitting, talk to your doctor about stop-smoking programs and medicines. These can increase your chances of quitting for good. · Do not take ibuprofen, naproxen, or similar medicines, unless your doctor tells you to. These medicines may make kidney problems worse. When should you call for help? [id number] anytime you think you may need emergency care. For example, call if: 
· You passed out (lost consciousness). Call your doctor now or seek immediate medical care if: 
· You have new or worse nausea and vomiting. · You have much less urine than normal, or you have no urine. · You are feeling confused or cannot think clearly. · You have new or more blood in your urine. · You have new swelling. · You are dizzy or lightheaded, or feel like you may faint. Watch closely for changes in your health, and be sure to contact your doctor if: 
· You do not get better as expected. Where can you learn more? Go to http://carolina-dionna.info/ Enter P361 in the search box to learn more about \"Diabetic Nephropathy: Care Instructions. \" Current as of: August 12, 2019               Content Version: 12.5 © 4766-0309 [employer]. Care instructions adapted under license by [employer] (which disclaims liability or warranty for this information). If you have questions about a medical condition or this instruction, always ask your healthcare professional. [place] [address] any warranty or liability for your use of this information.

## 2020-07-30 NOTE — PROGRESS NOTES
Subjective:     Danielle Lindo is a 62 y.o. male who presents for follow up of diabetes, hypertension, hyperlipidemia, coronary artery disease and obesity. Diet and Lifestyle: not attempting to follow a low fat, low cholesterol diet, not attempting to follow a low sodium diet, exercises sporadically, alcohol intake excessive + 20 beers a week. Home BP Monitoring: is not measured at home    Cardiovascular ROS: not taking medications regularly as instructed, no medication side effects noted, no TIA's, no chest pain on exertion, no dyspnea on exertion, no swelling of ankles. New concerns: pt is not taking Tradjenta for DM due to high cost. He was not on this med last time labs done. A1C was acceptable. He has not seen Cardiology recently. Cont to drink excessively.        Patient Active Problem List    Diagnosis Date Noted    Type 2 diabetes with nephropathy ([facility] 2020    Encounter for long-term current use of medication 2020    CAD (coronary artery disease)     Hx of CABG     Diabetes ([facility] 2019    Hypertension 2019    Hypercholesterolemia 2019    Coronary artery disease involving native coronary artery of native heart 2019     Current Outpatient Medications   Medication Sig Dispense Refill    losartan-hydroCHLOROthiazide (HYZAAR) 100-25 mg per tablet TAKE 1 TABLET BY MOUTH EVERY DAY IN THE MORNING INDICATIONS  HIGH BLOOD PRESSURE      metoprolol succinate (TOPROL-XL) 50 mg XL tablet TAKE 1 AND 1/2 TABLETS BY MOUTH TWICE A DAY 90 Tab 5    glipiZIDE SR (GLUCOTROL XL) 5 mg CR tablet TAKE 2 TABLET BY MOUTH 2 TIMES EVERY DAY WITH BREAKFAST AND DINNER  Indications: type 2 diabetes mellitus 360 Tab 1    metFORMIN (GLUCOPHAGE) 500 mg tablet TAKE 2 TABLET BY MOUTH 2 TIMES EVERY DAY WITH MORNING AND EVENING MEALS 360 Tab 1    isosorbide mononitrate ER (IMDUR) 30 mg tablet TAKE 1 TABLET BY MOUTH EVERY DAY IN THE MORNING 90 Tab 1    rosuvastatin (CRESTOR) 20 mg tablet Take 1 Tab by mouth nightly. 90 Tab 3    aspirin delayed-release 81 mg tablet Take  by mouth daily.  multivitamin (ONE A DAY) tablet Take 1 Tab by mouth daily.  clopidogrel (PLAVIX) 75 mg tab TAKE 1 TABLET BY MOUTH EVERY DAY  Indications: treatment to prevent a heart attack 90 Tab 3     Allergies   Allergen Reactions    Lisinopril Cough     Past Medical History:   Diagnosis Date    CAD (coronary artery disease)     Diabetes ([address]     Hx of CABG     Aug 2013 at [place] in [address] Hypercholesterolemia     Hypertension      Past Surgical History:   Procedure Laterality Date    CABG, ARTERY-VEIN, THREE  2013    HX CORONARY ARTERY BYPASS GRAFT  08/28/2013    HX HEART CATHETERIZATION  05/2019    HX HEENT Left 1990's    eye vitrectomy    HX VITRECTOMY Left 1997     Family History   Problem Relation Age of Onset    Diabetes Father     Diabetes Sister      Social History     Tobacco Use    Smoking status: Never Smoker    Smokeless tobacco: Never Used   Substance Use Topics    Alcohol use: Yes     Alcohol/week: 20.0 standard drinks     Types: 20 Cans of beer per week     Frequency: 4 or more times a week     Drinks per session: 3 or 4     Binge frequency: Less than monthly     Comment: beer or liquor        Lab Results   Component Value Date/Time    Hemoglobin A1c 7.0 (H) 12/19/2019 10:54 AM    Glucose 181 (H) 12/19/2019 10:54 AM    Microalbumin/Creat ratio (mg/g creat) 1,946 (H) 12/19/2019 10:54 AM    Microalbumin,urine random 436.00 12/19/2019 10:54 AM    LDL, calculated 57.4 12/19/2019 10:54 AM    Creatinine 0.97 12/19/2019 10:54 AM      Lab Results   Component Value Date/Time    Cholesterol, total 160 12/19/2019 10:54 AM    HDL Cholesterol 52 12/19/2019 10:54 AM    LDL, calculated 57.4 12/19/2019 10:54 AM    Triglyceride 253 (H) 12/19/2019 10:54 AM    CHOL/HDL Ratio 3.1 12/19/2019 10:54 AM     Lab Results   Component Value Date/Time    ALT (SGPT) 22 12/19/2019 10:54 AM    Alk.  phosphatase 66 12/19/2019 10:54 AM    Bilirubin, total 0.8 12/19/2019 10:54 AM    Albumin 3.8 12/19/2019 10:54 AM    Protein, total 6.4 12/19/2019 10:54 AM    PLATELET 320 (L) 87/22/9284 10:54 AM     Lab Results   Component Value Date/Time    GFR est non-AA >60 12/19/2019 10:54 AM    GFR est AA >60 12/19/2019 10:54 AM    Creatinine 0.97 12/19/2019 10:54 AM    BUN 21 (H) 12/19/2019 10:54 AM    Sodium 139 12/19/2019 10:54 AM    Potassium 4.6 12/19/2019 10:54 AM    Chloride 104 12/19/2019 10:54 AM    CO2 29 12/19/2019 10:54 AM     Lab Results   Component Value Date/Time    TSH 2.810 12/19/2019 10:54 AM         Review of Systems, additional:  Pertinent items are noted in HPI. Objective:     Visit Vitals  /78 (BP 1 Location: Left arm, BP Patient Position: Sitting)   Pulse 96   Temp 97.4 °F (36.3 °C) (Oral)   Resp 18   Ht 5' 2\" (1.575 m)   Wt 166 lb (75.3 kg)   SpO2 97%   BMI 30.36 kg/m²     Appearance: alert, well appearing, and in no distress and overweight. General exam: CVS exam BP noted to be well controlled today in office, S1, S2 normal, no gallop, no murmur, chest clear, no JVD, no HSM, no edema. Lab review: orders written for new lab studies as appropriate; see orders. Assessment/Plan:     diabetes , hypertension stable, hyperlipidemia , coronary artery disease stable. orders and follow up as documented in patient record  reviewed diet, exercise and weight control  strongly advised to cut back on alcohol intake. .     ICD-10-CM ICD-9-CM    1. Type 2 diabetes with nephropathy (HCC)  E11.21 250.40 HEMOGLOBIN A1C WITH EAG     583.81 MICROALBUMIN, UR, RAND W/ MICROALB/CREAT RATIO   2. Essential hypertension  I10 401.9 metoprolol succinate (TOPROL-XL) 50 mg XL tablet   3. Hypercholesterolemia  E78.00 272.0 LIPID PANEL   4. Coronary artery disease involving native coronary artery of native heart, angina presence unspecified  I25.10 414.01 REFERRAL TO CARDIOLOGY   5.  Encounter for long-term current use of medication Z79.899 V58.69 CBC WITH AUTOMATED DIFF      METABOLIC PANEL, COMPREHENSIVE   6. Excessive drinking alcohol  F10.10 305.00    7. Encounter for immunization  Z23 V03.89 PNEUMOCOCCAL POLYSACCHARIDE VACCINE, 23-VALENT, ADULT OR IMMUNOSUPPRESSED PT DOSE,      FL IMMUNIZ ADMIN,1 SINGLE/COMB VAC/TOXOID      TETANUS, DIPHTHERIA TOXOIDS AND ACELLULAR PERTUSSIS VACCINE (TDAP), IN INDIVIDS. >=7, IM   8. Type 2 diabetes mellitus with other specified complication, without long-term current use of insulin (HCC)  E11.69 250.80 glipiZIDE SR (GLUCOTROL XL) 5 mg CR tablet      metFORMIN (GLUCOPHAGE) 500 mg tablet     Med refills ordered. Labs ordered. Pneumovax 23 and TDAP given today  FU with Cardiology.

## 2020-09-03 ENCOUNTER — LAB ONLY (OUTPATIENT)
Dept: FAMILY MEDICINE CLINIC | Age: 55
End: 2020-09-03

## 2020-09-03 DIAGNOSIS — E11.21 TYPE 2 DIABETES WITH NEPHROPATHY (HCC): ICD-10-CM

## 2020-09-03 DIAGNOSIS — E78.00 HYPERCHOLESTEROLEMIA: ICD-10-CM

## 2020-09-03 DIAGNOSIS — Z79.899 ENCOUNTER FOR LONG-TERM CURRENT USE OF MEDICATION: ICD-10-CM

## 2020-09-03 LAB
ALBUMIN SERPL-MCNC: 3.9 G/DL (ref 3.5–5)
ALBUMIN/GLOB SERPL: 1.4 {RATIO} (ref 1.1–2.2)
ALP SERPL-CCNC: 63 U/L (ref 45–117)
ALT SERPL-CCNC: 36 U/L (ref 12–78)
ANION GAP SERPL CALC-SCNC: 7 MMOL/L (ref 5–15)
AST SERPL-CCNC: 21 U/L (ref 15–37)
BASOPHILS # BLD: 0 K/UL (ref 0–0.1)
BASOPHILS NFR BLD: 1 % (ref 0–1)
BILIRUB SERPL-MCNC: 1.2 MG/DL (ref 0.2–1)
BUN SERPL-MCNC: 17 MG/DL (ref 6–20)
BUN/CREAT SERPL: 17 (ref 12–20)
CALCIUM SERPL-MCNC: 9.6 MG/DL (ref 8.5–10.1)
CHLORIDE SERPL-SCNC: 102 MMOL/L (ref 97–108)
CHOLEST SERPL-MCNC: 137 MG/DL
CO2 SERPL-SCNC: 27 MMOL/L (ref 21–32)
CREAT SERPL-MCNC: 1.01 MG/DL (ref 0.7–1.3)
DIFFERENTIAL METHOD BLD: ABNORMAL
EOSINOPHIL # BLD: 0.1 K/UL (ref 0–0.4)
EOSINOPHIL NFR BLD: 3 % (ref 0–7)
ERYTHROCYTE [DISTWIDTH] IN BLOOD BY AUTOMATED COUNT: 15.6 % (ref 11.5–14.5)
EST. AVERAGE GLUCOSE BLD GHB EST-MCNC: 154 MG/DL
GLOBULIN SER CALC-MCNC: 2.7 G/DL (ref 2–4)
GLUCOSE SERPL-MCNC: 250 MG/DL (ref 65–100)
HBA1C MFR BLD: 7 % (ref 4–5.6)
HCT VFR BLD AUTO: 41.5 % (ref 36.6–50.3)
HDLC SERPL-MCNC: 41 MG/DL
HDLC SERPL: 3.3 {RATIO} (ref 0–5)
HGB BLD-MCNC: 12.8 G/DL (ref 12.1–17)
IMM GRANULOCYTES # BLD AUTO: 0 K/UL (ref 0–0.04)
IMM GRANULOCYTES NFR BLD AUTO: 0 % (ref 0–0.5)
LDLC SERPL CALC-MCNC: 19.4 MG/DL (ref 0–100)
LIPID PROFILE,FLP: ABNORMAL
LYMPHOCYTES # BLD: 0.9 K/UL (ref 0.8–3.5)
LYMPHOCYTES NFR BLD: 27 % (ref 12–49)
MCH RBC QN AUTO: 32 PG (ref 26–34)
MCHC RBC AUTO-ENTMCNC: 30.8 G/DL (ref 30–36.5)
MCV RBC AUTO: 103.8 FL (ref 80–99)
MONOCYTES # BLD: 0.4 K/UL (ref 0–1)
MONOCYTES NFR BLD: 13 % (ref 5–13)
NEUTS SEG # BLD: 1.8 K/UL (ref 1.8–8)
NEUTS SEG NFR BLD: 56 % (ref 32–75)
NRBC # BLD: 0 K/UL (ref 0–0.01)
NRBC BLD-RTO: 0 PER 100 WBC
PLATELET # BLD AUTO: 128 K/UL (ref 150–400)
PMV BLD AUTO: 11.4 FL (ref 8.9–12.9)
POTASSIUM SERPL-SCNC: 4.8 MMOL/L (ref 3.5–5.1)
PROT SERPL-MCNC: 6.6 G/DL (ref 6.4–8.2)
RBC # BLD AUTO: 4 M/UL (ref 4.1–5.7)
SODIUM SERPL-SCNC: 136 MMOL/L (ref 136–145)
TRIGL SERPL-MCNC: 383 MG/DL (ref ?–150)
VLDLC SERPL CALC-MCNC: 76.6 MG/DL
WBC # BLD AUTO: 3.3 K/UL (ref 4.1–11.1)

## 2020-09-04 LAB
CREAT UR-MCNC: 140 MG/DL
MICROALBUMIN UR-MCNC: 443 MG/DL
MICROALBUMIN/CREAT UR-RTO: 3164 MG/G (ref 0–30)

## 2020-09-06 ENCOUNTER — TELEPHONE (OUTPATIENT)
Dept: FAMILY MEDICINE CLINIC | Age: 55
End: 2020-09-06

## 2020-09-06 DIAGNOSIS — E11.21 TYPE 2 DIABETES WITH NEPHROPATHY (HCC): Primary | ICD-10-CM

## 2020-09-06 NOTE — PROGRESS NOTES
High urine microalbumin ratio. Good total chol and LDL but high triglycerides. A1C shows good diabetes control even though the glucose level remains high. Normal kidney and liver function. Please work on following a diabetic diet. Limit alcohol intake, since this can affect triglycerides. Have you ever seen a kidney specialist regarding the high protein in urine?

## 2020-09-21 NOTE — TELEPHONE ENCOUNTER
Discussed test results. Plan to refer to Nephrology for large amount albumin in urine. Please set up appt and call pt with date. Fax labs to Nephro clinic.

## 2020-09-22 NOTE — TELEPHONE ENCOUNTER
Called Dr Beth Khoury office 9/22/20 at 11:23am and was informed no appt can be made until records are sent over and they will call pt if will to take them as a new pt

## 2020-09-22 NOTE — TELEPHONE ENCOUNTER
Maira Willingham, please fax over my last office note and last year of lab results to [address]. Thanks.

## 2020-10-17 RX ORDER — LOSARTAN POTASSIUM AND HYDROCHLOROTHIAZIDE 25; 100 MG/1; MG/1
TABLET ORAL
Qty: 90 TAB | Refills: 1 | Status: SHIPPED | OUTPATIENT
Start: 2020-10-17 | End: 2021-04-25

## 2020-12-19 DIAGNOSIS — I25.10 CORONARY ARTERY DISEASE INVOLVING NATIVE CORONARY ARTERY OF NATIVE HEART, ANGINA PRESENCE UNSPECIFIED: ICD-10-CM

## 2020-12-21 RX ORDER — CLOPIDOGREL BISULFATE 75 MG/1
TABLET ORAL
Qty: 90 TAB | Refills: 3 | Status: SHIPPED | OUTPATIENT
Start: 2020-12-21 | End: 2021-04-30

## 2020-12-21 RX ORDER — ISOSORBIDE MONONITRATE 30 MG/1
TABLET, EXTENDED RELEASE ORAL
Qty: 90 TAB | Refills: 1 | Status: SHIPPED | OUTPATIENT
Start: 2020-12-21 | End: 2021-06-18

## 2021-01-29 ENCOUNTER — OFFICE VISIT (OUTPATIENT)
Dept: FAMILY MEDICINE CLINIC | Age: 56
End: 2021-01-29
Payer: COMMERCIAL

## 2021-01-29 VITALS
OXYGEN SATURATION: 97 % | TEMPERATURE: 98.2 F | DIASTOLIC BLOOD PRESSURE: 82 MMHG | HEART RATE: 97 BPM | BODY MASS INDEX: 30.95 KG/M2 | WEIGHT: 168.2 LBS | SYSTOLIC BLOOD PRESSURE: 138 MMHG | RESPIRATION RATE: 20 BRPM | HEIGHT: 62 IN

## 2021-01-29 DIAGNOSIS — I25.10 CORONARY ARTERY DISEASE INVOLVING NATIVE CORONARY ARTERY OF NATIVE HEART WITHOUT ANGINA PECTORIS: ICD-10-CM

## 2021-01-29 DIAGNOSIS — E11.69 TYPE 2 DIABETES MELLITUS WITH OTHER SPECIFIED COMPLICATION, WITHOUT LONG-TERM CURRENT USE OF INSULIN (HCC): ICD-10-CM

## 2021-01-29 DIAGNOSIS — Z79.899 ENCOUNTER FOR LONG-TERM CURRENT USE OF MEDICATION: ICD-10-CM

## 2021-01-29 DIAGNOSIS — E78.00 HYPERCHOLESTEROLEMIA: ICD-10-CM

## 2021-01-29 DIAGNOSIS — E11.21 TYPE 2 DIABETES WITH NEPHROPATHY (HCC): Primary | ICD-10-CM

## 2021-01-29 DIAGNOSIS — I10 ESSENTIAL HYPERTENSION: ICD-10-CM

## 2021-01-29 PROCEDURE — 99214 OFFICE O/P EST MOD 30 MIN: CPT | Performed by: FAMILY MEDICINE

## 2021-01-29 RX ORDER — METFORMIN HYDROCHLORIDE 500 MG/1
TABLET ORAL
Qty: 360 TAB | Refills: 1 | Status: SHIPPED | OUTPATIENT
Start: 2021-01-29 | End: 2021-08-01

## 2021-01-29 RX ORDER — ROSUVASTATIN CALCIUM 20 MG/1
20 TABLET, COATED ORAL
Qty: 90 TAB | Refills: 3 | Status: SHIPPED | OUTPATIENT
Start: 2021-01-29 | End: 2022-02-04

## 2021-01-29 RX ORDER — GLIPIZIDE 5 MG/1
TABLET, FILM COATED, EXTENDED RELEASE ORAL
Qty: 360 TAB | Refills: 1 | Status: SHIPPED | OUTPATIENT
Start: 2021-01-29 | End: 2021-08-01

## 2021-01-29 RX ORDER — METOPROLOL SUCCINATE 50 MG/1
TABLET, EXTENDED RELEASE ORAL
Qty: 90 TAB | Refills: 5 | Status: SHIPPED | OUTPATIENT
Start: 2021-01-29 | End: 2021-03-11 | Stop reason: SDUPTHER

## 2021-01-29 NOTE — PROGRESS NOTES
Identified pt with two pt identifiers(name and ). Chief Complaint   Patient presents with    Hypertension    Diabetes    Medication Refill        Health Maintenance Due   Topic    COVID-19 Vaccine (1 of 2)    Shingrix Vaccine Age 50> (1 of 2)    Foot Exam Q1        Wt Readings from Last 3 Encounters:   21 168 lb 3.2 oz (76.3 kg)   20 166 lb (75.3 kg)   20 165 lb (74.8 kg)     Temp Readings from Last 3 Encounters:   21 98.2 °F (36.8 °C) (Oral)   20 97.4 °F (36.3 °C) (Oral)   19 98.6 °F (37 °C) (Oral)     BP Readings from Last 3 Encounters:   21 138/82   20 122/78   20 144/88     Pulse Readings from Last 3 Encounters:   21 97   20 96   20 84         Learning Assessment:  :     Learning Assessment 2019   PRIMARY LEARNER Patient   HIGHEST LEVEL OF EDUCATION - PRIMARY LEARNER  GRADUATED HIGH SCHOOL OR GED   BARRIERS PRIMARY LEARNER NONE   CO-LEARNER CAREGIVER No   PRIMARY LANGUAGE ENGLISH   LEARNER PREFERENCE PRIMARY READING     DEMONSTRATION   ANSWERED BY self   RELATIONSHIP SELF       Depression Screening:  :     3 most recent PHQ Screens 2021   Little interest or pleasure in doing things Not at all   Feeling down, depressed, irritable, or hopeless Not at all   Total Score PHQ 2 0       Fall Risk Assessment:  :     No flowsheet data found. Abuse Screening:  :     Abuse Screening Questionnaire 2019   Do you ever feel afraid of your partner? N N N   Are you in a relationship with someone who physically or mentally threatens you? N N N   Is it safe for you to go home?  Jean Pierre Stringer       Coordination of Care Questionnaire:  :     1) Have you been to an emergency room, urgent care clinic since your last visit? no   Hospitalized since your last visit? no             2) Have you seen or consulted any other health care providers outside of [address] [place] [address] since your last visit? no  (Include any pap smears or colon screenings in this section.)    3) Do you have an Advance Directive on file? no  Are you interested in receiving information about Advance Directives? no    Reviewed record in preparation for visit and have obtained necessary documentation. Medication reconciliation up to date and corrected with patient at this time.

## 2021-01-29 NOTE — PROGRESS NOTES
Subjective:     Jong Leach is a 60 y.o. male seen for follow up of diabetes. He also has hypertension, hyperlipidemia, coronary artery disease and obesity. Diabetic Review of Systems - medication compliance: compliant most of the time, diabetic diet compliance: noncompliant some of the time, home glucose monitoring: is performed sporadically. Other symptoms and concerns: due for labs. He admits to having occasional CP with exertion. Need FU with Cardiology. Had cardiac eval in [facility] in 2019. Told he could be managed just with meds. He cont to drink excess alcohol. Patient Active Problem List    Diagnosis Date Noted    Type 2 diabetes with nephropathy ([facility] 07/29/2020    Encounter for long-term current use of medication 07/29/2020    CAD (coronary artery disease)     Hx of CABG     Diabetes ([facility] 12/11/2019    Hypertension 12/11/2019    Hypercholesterolemia 12/11/2019    Coronary artery disease involving native coronary artery of native heart 12/11/2019     Current Outpatient Medications   Medication Sig Dispense Refill    metFORMIN (GLUCOPHAGE) 500 mg tablet TAKE 2 TABLET BY MOUTH 2 TIMES EVERY DAY WITH MORNING AND EVENING MEALS 360 Tab 1    metoprolol succinate (TOPROL-XL) 50 mg XL tablet TAKE 1 AND 1/2 TABLETS BY MOUTH TWICE A DAY 90 Tab 5    glipiZIDE SR (GLUCOTROL XL) 5 mg CR tablet TAKE 2 TABLET BY MOUTH 2 TIMES EVERY DAY WITH BREAKFAST AND DINNER  Indications: type 2 diabetes mellitus 360 Tab 1    rosuvastatin (CRESTOR) 20 mg tablet Take 1 Tab by mouth nightly.  90 Tab 3    clopidogreL (PLAVIX) 75 mg tab TAKE 1 TABLET BY MOUTH EVERY DAY INDICATIONS: TREATMENT TO PREVENT A HEART ATTACK 90 Tab 3    isosorbide mononitrate ER (IMDUR) 30 mg tablet TAKE 1 TABLET BY MOUTH EVERY DAY IN THE MORNING 90 Tab 1    losartan-hydroCHLOROthiazide (HYZAAR) 100-25 mg per tablet TAKE 1 TABLET BY MOUTH EVERY DAY IN THE MORNING INDICATIONS: HIGH BLOOD PRESSURE 90 Tab 1    aspirin delayed-release 81 mg tablet Take  by mouth daily.  multivitamin (ONE A DAY) tablet Take 1 Tab by mouth daily. Allergies   Allergen Reactions    Lisinopril Cough     Past Medical History:   Diagnosis Date    CAD (coronary artery disease)     Diabetes ([address]     Hx of CABG     Aug 2013 at [place] in [place]     Hypercholesterolemia     Hypertension      Past Surgical History:   Procedure Laterality Date    HX CORONARY ARTERY BYPASS GRAFT  08/28/2013    HX HEART CATHETERIZATION  05/2019    HX HEENT Left 1990's    eye vitrectomy    HX VITRECTOMY Left 1997    [place] CABG, ARTERY-VEIN, THREE  2013     Family History   Problem Relation Age of Onset    Diabetes Father     Diabetes Sister      Social History     Tobacco Use    Smoking status: Never Smoker    Smokeless tobacco: Never Used   Substance Use Topics    Alcohol use: Yes     Alcohol/week: 20.0 standard drinks     Types: 20 Cans of beer per week     Frequency: 4 or more times a week     Drinks per session: 3 or 4     Binge frequency: Less than monthly     Comment: beer or liquor             Review of Systems  Pertinent items are noted in HPI. Objective:     Visit Vitals  /82 (BP 1 Location: Left upper arm, BP Patient Position: Sitting) Comment: manual   Pulse 97   Temp 98.2 °F (36.8 °C) (Oral)   Resp 20   Ht 5' 2\" (1.575 m)   Wt 168 lb 3.2 oz (76.3 kg)   SpO2 97%   BMI 30.76 kg/m²     Appearance: alert, well appearing, and in no distress and overweight. Exam: heart sounds normal rate, regular rhythm, normal S1, S2, no murmurs, rubs, clicks or gallops, chest clear  Lab review: orders written for new lab studies as appropriate; see orders.   Diabetic foot exam:     Left Foot:   Visual Exam: normal    Pulse DP: 2+ (normal)   Filament test: normal sensation          Right Foot:   Visual Exam: normal    Pulse DP: 2+ (normal)   Filament test: normal sensation          Assessment/Plan:     .  Diabetic issues reviewed with him: foot care discussed and Podiatry visits discussed, annual eye examinations at Ophthalmology discussed and glycohemoglobin and other lab monitoring discussed. ICD-10-CM ICD-9-CM    1. Type 2 diabetes with nephropathy (HCC)  E11.21 250.40 MICROALBUMIN, UR, RAND W/ MICROALB/CREAT RATIO     583.81 HEMOGLOBIN A1C WITH EAG      HM DIABETES FOOT EXAM   2. Type 2 diabetes mellitus with other specified complication, without long-term current use of insulin (HCC)  E11.69 250.80 metFORMIN (GLUCOPHAGE) 500 mg tablet      glipiZIDE SR (GLUCOTROL XL) 5 mg CR tablet   3. Hypercholesterolemia  E78.00 272.0 LIPID PANEL      rosuvastatin (CRESTOR) 20 mg tablet   4. Essential hypertension  I10 401.9 TSH 3RD GENERATION      metoprolol succinate (TOPROL-XL) 50 mg XL tablet   5. Coronary artery disease involving native coronary artery of native heart without angina pectoris  I25.10 414.01 REFERRAL TO CARDIOLOGY   6. Encounter for long-term current use of medication  W89.437 E68.69 METABOLIC PANEL, COMPREHENSIVE      CBC WITH AUTOMATED DIFF     Order labs. FU with Cardiology  Need cut back on ETOH  Request records of cardiac evaln from [facility] in 2019  He has FU with Nephrology. Plan to fax labs to their office.

## 2021-02-04 ENCOUNTER — LAB ONLY (OUTPATIENT)
Dept: FAMILY MEDICINE CLINIC | Age: 56
End: 2021-02-04

## 2021-02-04 DIAGNOSIS — E78.00 HYPERCHOLESTEROLEMIA: ICD-10-CM

## 2021-02-04 DIAGNOSIS — I10 ESSENTIAL HYPERTENSION: ICD-10-CM

## 2021-02-04 DIAGNOSIS — E11.21 TYPE 2 DIABETES WITH NEPHROPATHY (HCC): ICD-10-CM

## 2021-02-04 DIAGNOSIS — Z79.899 ENCOUNTER FOR LONG-TERM CURRENT USE OF MEDICATION: ICD-10-CM

## 2021-02-05 LAB
ALBUMIN SERPL-MCNC: 3.9 G/DL (ref 3.5–5)
ALBUMIN/GLOB SERPL: 1.5 {RATIO} (ref 1.1–2.2)
ALP SERPL-CCNC: 68 U/L (ref 45–117)
ALT SERPL-CCNC: 50 U/L (ref 12–78)
ANION GAP SERPL CALC-SCNC: 3 MMOL/L (ref 5–15)
AST SERPL-CCNC: 36 U/L (ref 15–37)
BASOPHILS # BLD: 0 K/UL (ref 0–0.1)
BASOPHILS NFR BLD: 1 % (ref 0–1)
BILIRUB SERPL-MCNC: 1 MG/DL (ref 0.2–1)
BUN SERPL-MCNC: 18 MG/DL (ref 6–20)
BUN/CREAT SERPL: 16 (ref 12–20)
CALCIUM SERPL-MCNC: 9.3 MG/DL (ref 8.5–10.1)
CHLORIDE SERPL-SCNC: 105 MMOL/L (ref 97–108)
CHOLEST SERPL-MCNC: 132 MG/DL
CO2 SERPL-SCNC: 30 MMOL/L (ref 21–32)
CREAT SERPL-MCNC: 1.14 MG/DL (ref 0.7–1.3)
CREAT UR-MCNC: 112 MG/DL
DIFFERENTIAL METHOD BLD: ABNORMAL
EOSINOPHIL # BLD: 0.1 K/UL (ref 0–0.4)
EOSINOPHIL NFR BLD: 3 % (ref 0–7)
ERYTHROCYTE [DISTWIDTH] IN BLOOD BY AUTOMATED COUNT: 14.1 % (ref 11.5–14.5)
EST. AVERAGE GLUCOSE BLD GHB EST-MCNC: 151 MG/DL
GLOBULIN SER CALC-MCNC: 2.6 G/DL (ref 2–4)
GLUCOSE SERPL-MCNC: 208 MG/DL (ref 65–100)
HBA1C MFR BLD: 6.9 % (ref 4–5.6)
HCT VFR BLD AUTO: 36.2 % (ref 36.6–50.3)
HDLC SERPL-MCNC: 54 MG/DL
HDLC SERPL: 2.4 {RATIO} (ref 0–5)
HGB BLD-MCNC: 12.4 G/DL (ref 12.1–17)
IMM GRANULOCYTES # BLD AUTO: 0 K/UL (ref 0–0.04)
IMM GRANULOCYTES NFR BLD AUTO: 1 % (ref 0–0.5)
LDLC SERPL CALC-MCNC: 32.6 MG/DL (ref 0–100)
LIPID PROFILE,FLP: ABNORMAL
LYMPHOCYTES # BLD: 0.8 K/UL (ref 0.8–3.5)
LYMPHOCYTES NFR BLD: 23 % (ref 12–49)
MCH RBC QN AUTO: 31.7 PG (ref 26–34)
MCHC RBC AUTO-ENTMCNC: 34.3 G/DL (ref 30–36.5)
MCV RBC AUTO: 92.6 FL (ref 80–99)
MICROALBUMIN UR-MCNC: 277 MG/DL
MICROALBUMIN/CREAT UR-RTO: 2473 MG/G (ref 0–30)
MONOCYTES # BLD: 0.4 K/UL (ref 0–1)
MONOCYTES NFR BLD: 11 % (ref 5–13)
NEUTS SEG # BLD: 2.1 K/UL (ref 1.8–8)
NEUTS SEG NFR BLD: 61 % (ref 32–75)
NRBC # BLD: 0 K/UL (ref 0–0.01)
NRBC BLD-RTO: 0 PER 100 WBC
PLATELET # BLD AUTO: 132 K/UL (ref 150–400)
PMV BLD AUTO: 11.4 FL (ref 8.9–12.9)
POTASSIUM SERPL-SCNC: 4.7 MMOL/L (ref 3.5–5.1)
PROT SERPL-MCNC: 6.5 G/DL (ref 6.4–8.2)
RBC # BLD AUTO: 3.91 M/UL (ref 4.1–5.7)
SODIUM SERPL-SCNC: 138 MMOL/L (ref 136–145)
TRIGL SERPL-MCNC: 227 MG/DL (ref ?–150)
TSH SERPL DL<=0.05 MIU/L-ACNC: 1.82 UIU/ML (ref 0.36–3.74)
VLDLC SERPL CALC-MCNC: 45.4 MG/DL
WBC # BLD AUTO: 3.4 K/UL (ref 4.1–11.1)

## 2021-02-10 NOTE — PROGRESS NOTES
Labs show good diabetes control (A1C) even though glucose level is high. Normal thyroid test. Urine microalbumin remains very high. Cholesterol numbers are stable. Good improvement in triglycerides. Normal kidney and liver function. Follow up in 6 months.

## 2021-02-22 ENCOUNTER — TELEPHONE (OUTPATIENT)
Dept: FAMILY MEDICINE CLINIC | Age: 56
End: 2021-02-22

## 2021-02-22 NOTE — TELEPHONE ENCOUNTER
I spoke with pt. His Cardiology records from [facility] are scanned in [facility] folder of chart. He did not get the My CHart message regarding lab results. He had to cancel appt with Nephrology because lab requisition from their office just arrived. It lists labs that were not done when he saw me. Advised pt to go to [employer] at [facility] for drawing and reschedule Nephro appt. Diaz Woo, please fax his recent lab results to [facility].

## 2021-02-22 NOTE — TELEPHONE ENCOUNTER
Patient wants to know if you contacted your cardiologist Dr. Olena Lovett in [facility] he has an appointment with Dr Corinne Raja and wants to make sure that the information was sent to Dr Corinne Raja  His appointment on March 11th. Please call [phone] if there are any questions. Also  He had a lot of protein in his urine went to the kidney Dr.  Also he has not received any results from his bloodwork from this clinic and from the kidney Dr results.

## 2021-02-23 NOTE — TELEPHONE ENCOUNTER
Faxed 2/23/1 at 11:38am labs to [phone] [facility] location as not sure what location pt is seeing dr Nani Snow at

## 2021-03-11 ENCOUNTER — OFFICE VISIT (OUTPATIENT)
Dept: CARDIOLOGY CLINIC | Age: 56
End: 2021-03-11
Payer: COMMERCIAL

## 2021-03-11 VITALS
BODY MASS INDEX: 30.66 KG/M2 | OXYGEN SATURATION: 97 % | SYSTOLIC BLOOD PRESSURE: 170 MMHG | HEART RATE: 96 BPM | WEIGHT: 166.6 LBS | DIASTOLIC BLOOD PRESSURE: 80 MMHG | HEIGHT: 62 IN | RESPIRATION RATE: 16 BRPM

## 2021-03-11 DIAGNOSIS — I10 ESSENTIAL HYPERTENSION: ICD-10-CM

## 2021-03-11 DIAGNOSIS — Z95.1 HX OF CABG: ICD-10-CM

## 2021-03-11 DIAGNOSIS — E78.00 HYPERCHOLESTEROLEMIA: ICD-10-CM

## 2021-03-11 DIAGNOSIS — I25.10 CORONARY ARTERY DISEASE INVOLVING NATIVE CORONARY ARTERY OF NATIVE HEART, ANGINA PRESENCE UNSPECIFIED: Primary | ICD-10-CM

## 2021-03-11 PROCEDURE — 93000 ELECTROCARDIOGRAM COMPLETE: CPT | Performed by: SPECIALIST

## 2021-03-11 PROCEDURE — 99214 OFFICE O/P EST MOD 30 MIN: CPT | Performed by: SPECIALIST

## 2021-03-11 RX ORDER — METOPROLOL SUCCINATE 200 MG/1
200 TABLET, EXTENDED RELEASE ORAL DAILY
Qty: 90 TAB | Refills: 3 | Status: SHIPPED | OUTPATIENT
Start: 2021-03-11 | End: 2022-03-22 | Stop reason: SDUPTHER

## 2021-03-11 RX ORDER — AMLODIPINE BESYLATE 5 MG/1
5 TABLET ORAL DAILY
Qty: 90 TAB | Refills: 3 | Status: SHIPPED | OUTPATIENT
Start: 2021-03-11 | End: 2021-09-08 | Stop reason: DRUGHIGH

## 2021-03-11 NOTE — PROGRESS NOTES
Brooklyn Cruz MD. [facility]              Patient: Agnes Shafer  : 1965      Today's Date: 3/11/2021            HISTORY OF PRESENT ILLNESS:     History of Present Illness:  Here for follow-up. Dr. Franca Louise noted in   - \"Other symptoms and concerns: due for labs. He admits to having occasional CP with exertion. Need FU with Cardiology. Had cardiac eval in [facility] in 2019. Told he could be managed just with meds. He cont to drink excess alcohol. \"    He says he is having chest pain - no change since last cath in 2019 - he has not been exercising since he has exertional chest pain. Maybe more RAMIREZ now. No pain at rest.                PAST MEDICAL HISTORY:     Past Medical History:   Diagnosis Date    CAD (coronary artery disease)     Diabetes ([address]     Hx of CABG     Aug 2013 at [place] in [place]     Hypercholesterolemia     Hypertension          Past Surgical History:   Procedure Laterality Date    HX CORONARY ARTERY BYPASS GRAFT  2013    HX HEART CATHETERIZATION  2019    HX HEENT Left     eye vitrectomy    HX VITRECTOMY Left     [place] CABG, ARTERY-VEIN, THREE             MEDICATIONS:     Current Outpatient Medications   Medication Sig Dispense Refill    metFORMIN (GLUCOPHAGE) 500 mg tablet TAKE 2 TABLET BY MOUTH 2 TIMES EVERY DAY WITH MORNING AND EVENING MEALS 360 Tab 1    metoprolol succinate (TOPROL-XL) 50 mg XL tablet TAKE 1 AND 1/2 TABLETS BY MOUTH TWICE A DAY 90 Tab 5    glipiZIDE SR (GLUCOTROL XL) 5 mg CR tablet TAKE 2 TABLET BY MOUTH 2 TIMES EVERY DAY WITH BREAKFAST AND DINNER  Indications: type 2 diabetes mellitus 360 Tab 1    rosuvastatin (CRESTOR) 20 mg tablet Take 1 Tab by mouth nightly.  90 Tab 3    clopidogreL (PLAVIX) 75 mg tab TAKE 1 TABLET BY MOUTH EVERY DAY INDICATIONS: TREATMENT TO PREVENT A HEART ATTACK 90 Tab 3    isosorbide mononitrate ER (IMDUR) 30 mg tablet TAKE 1 TABLET BY MOUTH EVERY DAY IN THE MORNING 90 Tab 1    losartan-hydroCHLOROthiazide (HYZAAR) 100-25 mg per tablet TAKE 1 TABLET BY MOUTH EVERY DAY IN THE MORNING INDICATIONS: HIGH BLOOD PRESSURE 90 Tab 1    aspirin delayed-release 81 mg tablet Take  by mouth daily.  multivitamin (ONE A DAY) tablet Take 1 Tab by mouth daily. Allergies   Allergen Reactions    Lisinopril Cough             SOCIAL HISTORY:     Social History     Tobacco Use    Smoking status: Never Smoker    Smokeless tobacco: Never Used   Substance Use Topics    Alcohol use: Yes     Alcohol/week: 20.0 standard drinks     Types: 20 Shots of liquor per week     Frequency: 4 or more times a week     Drinks per session: 3 or 4     Binge frequency: Less than monthly     Comment: whisky     Drug use: Never         FAMILY HISTORY:     Family History   Problem Relation Age of Onset    Diabetes Father     Diabetes Sister             REVIEW OF SYMPTOMS:      Review of Symptoms:  Constitutional: Negative for fever, chills  HEENT: Negative for nosebleeds, tinnitus, and vision changes. Respiratory: Negative for cough, wheezing  Cardiovascular: Negative for orthopnea, claudication, syncope, and PND. Gastrointestinal: Negative for abdominal pain, diarrhea, melena. Genitourinary: Negative for dysuria  Musculoskeletal: Negative for myalgias. Skin: Negative for rash  Heme: No problems bleeding. Neurological: Negative for speech change and focal weakness.            PHYSICAL EXAM:      Physical Exam:  Visit Vitals  BP (!) 170/80 (BP 1 Location: Left upper arm, BP Patient Position: Sitting, BP Cuff Size: Adult)   Pulse 96   Resp 16   Ht 5' 2\" (1.575 m)   Wt 166 lb 9.6 oz (75.6 kg)   SpO2 97%   BMI 30.47 kg/m²       Patient appears generally well, mood and affect are appropriate and pleasant. HEENT:  Hearing intact, non-icteric, normocephalic, atraumatic. Neck Exam: Supple, No JVD    Lung Exam: Clear to auscultation, even breath sounds.    Cardiac Exam: Regular rate and rhythm with no murmur or rub  Abdomen: Soft, non-tender, normal bowel sounds. Extremities: Moves all ext well. No lower extremity edema. Psych: Appropriate affect  Neuro - Grossly intact        LABS / OTHER STUDIES:      Lab Results   Component Value Date/Time    Sodium 138 02/04/2021 10:29 AM    Potassium 4.7 02/04/2021 10:29 AM    Chloride 105 02/04/2021 10:29 AM    CO2 30 02/04/2021 10:29 AM    Anion gap 3 (L) 02/04/2021 10:29 AM    Glucose 208 (H) 02/04/2021 10:29 AM    BUN 18 02/04/2021 10:29 AM    Creatinine 1.14 02/04/2021 10:29 AM    BUN/Creatinine ratio 16 02/04/2021 10:29 AM    GFR est AA >60 02/04/2021 10:29 AM    GFR est non-AA >60 02/04/2021 10:29 AM    Calcium 9.3 02/04/2021 10:29 AM    Bilirubin, total 1.0 02/04/2021 10:29 AM    Alk. phosphatase 68 02/04/2021 10:29 AM    Protein, total 6.5 02/04/2021 10:29 AM    Albumin 3.9 02/04/2021 10:29 AM    Globulin 2.6 02/04/2021 10:29 AM    A-G Ratio 1.5 02/04/2021 10:29 AM    ALT (SGPT) 50 02/04/2021 10:29 AM    AST (SGOT) 36 02/04/2021 10:29 AM       Lab Results   Component Value Date/Time    WBC 3.4 (L) 02/04/2021 10:29 AM    HGB 12.4 02/04/2021 10:29 AM    HCT 36.2 (L) 02/04/2021 10:29 AM    PLATELET 764 (L) 01/37/8105 10:29 AM    MCV 92.6 02/04/2021 10:29 AM       Lab Results   Component Value Date/Time    Cholesterol, total 132 02/04/2021 10:29 AM    HDL Cholesterol 54 02/04/2021 10:29 AM    LDL, calculated 32.6 02/04/2021 10:29 AM    VLDL, calculated 45.4 02/04/2021 10:29 AM    Triglyceride 227 (H) 02/04/2021 10:29 AM    CHOL/HDL Ratio 2.4 02/04/2021 10:29 AM       Lab Results   Component Value Date/Time    TSH 1.82 02/04/2021 10:29 AM                    CARDIAC DIAGNOSTICS:      Cardiac Evaluation Includes:     Exercise cardiolite 5/1/19 - walked 8:30, no ischemic EKG changes, normal MPI. Stress EF 48%     Cardiac Cath 5/31/19 - LAD occluded. LIMA to LAD patent. Occluded SVG to Diag. Occluded SVG to OM. Distal PLB disease (small vessel).  LVEF 55%  ---> medical management recommended      EKG 1/14/20 - NSR, normal   EKG 3/11/21 - NSR, normal            ASSESSMENT AND PLAN:      Assessment and Plan:     1) CAD / CABG  - He has history of CABG in 2013. He saw Dr. Tank Deal in [facility] in 2019 - says he had a nuclear stress test and then a cath - had some small vessel blockages and treated medically. - has chronic class 2-3 angina (pain walking up hills or exerting himself)   - On 3/11/21 - continues with exertional chest pain - not much change past 2 years ---> Will increase metoprolol to 200 mg once daily (from 175 mg he takes). Add Norvasc 5 mg daily. Cont statin, Imdur, ARB. Can increase amlodipine to 10 mg or add Renexa if he still has angina. If problems get worse, can repeat cath if necessary. Recheck an echo next visit. - He is on DAPT - plavix started in May 2019 - will continue for now and may stop once chest pain issues better controlled.    2) HTN   - Add amlodipine 5 mg daily to regimen   - keep log of BP at home  - Gooal < 130/80      3) Dyslipidemia  - Cont Crestor 20 mg daily      4) Etoh - admits drinking too much -- discussed cutting back     5) DM    6)  See NP in one month with an echo. Patient expressed understanding of the plan - questions were answered. Moved from [place] to [place] in 1984. Retired from [employer] 2019. Family in [place]. Single.       Alex Chavez MD, [address], [address]  Ph: [phone]                               Ph [phone]        ADDENDUM   4/27/2021  Echo 4/27/21 - LVEF 65%    NP to call

## 2021-04-25 RX ORDER — LOSARTAN POTASSIUM AND HYDROCHLOROTHIAZIDE 25; 100 MG/1; MG/1
TABLET ORAL
Qty: 90 TAB | Refills: 1 | Status: SHIPPED | OUTPATIENT
Start: 2021-04-25 | End: 2021-09-08 | Stop reason: SDUPTHER

## 2021-04-27 ENCOUNTER — ANCILLARY PROCEDURE (OUTPATIENT)
Dept: CARDIOLOGY CLINIC | Age: 56
End: 2021-04-27

## 2021-04-27 ENCOUNTER — OFFICE VISIT (OUTPATIENT)
Dept: CARDIOLOGY CLINIC | Age: 56
End: 2021-04-27
Payer: COMMERCIAL

## 2021-04-27 VITALS
HEIGHT: 63 IN | SYSTOLIC BLOOD PRESSURE: 118 MMHG | BODY MASS INDEX: 29.41 KG/M2 | WEIGHT: 166 LBS | DIASTOLIC BLOOD PRESSURE: 70 MMHG

## 2021-04-27 VITALS
WEIGHT: 166 LBS | HEIGHT: 62 IN | DIASTOLIC BLOOD PRESSURE: 70 MMHG | BODY MASS INDEX: 30.55 KG/M2 | SYSTOLIC BLOOD PRESSURE: 118 MMHG | HEART RATE: 87 BPM

## 2021-04-27 DIAGNOSIS — Z95.1 HX OF CABG: ICD-10-CM

## 2021-04-27 DIAGNOSIS — I10 ESSENTIAL HYPERTENSION: ICD-10-CM

## 2021-04-27 DIAGNOSIS — I25.10 CORONARY ARTERY DISEASE INVOLVING NATIVE CORONARY ARTERY OF NATIVE HEART WITHOUT ANGINA PECTORIS: Primary | ICD-10-CM

## 2021-04-27 DIAGNOSIS — R07.9 EXERTIONAL CHEST PAIN: ICD-10-CM

## 2021-04-27 DIAGNOSIS — E78.5 DYSLIPIDEMIA: ICD-10-CM

## 2021-04-27 DIAGNOSIS — I25.10 CORONARY ARTERY DISEASE INVOLVING NATIVE CORONARY ARTERY OF NATIVE HEART, UNSPECIFIED WHETHER ANGINA PRESENT: ICD-10-CM

## 2021-04-27 LAB
ECHO AO ROOT DIAM: 3.44 CM
ECHO AV AREA PEAK VELOCITY: 2.91 CM2
ECHO AV AREA VTI: 3.09 CM2
ECHO AV AREA/BSA PEAK VELOCITY: 1.6 CM2/M2
ECHO AV AREA/BSA VTI: 1.7 CM2/M2
ECHO AV MEAN GRADIENT: 4.13 MMHG
ECHO AV PEAK GRADIENT: 7.74 MMHG
ECHO AV PEAK VELOCITY: 139.13 CM/S
ECHO AV VTI: 24.82 CM
ECHO LA AREA 4C: 16.04 CM2
ECHO LA MAJOR AXIS: 4.34 CM
ECHO LA MINOR AXIS: 2.46 CM
ECHO LA VOL 2C: 45.67 ML (ref 18–58)
ECHO LA VOL 4C: 41.07 ML (ref 18–58)
ECHO LA VOL BP: 46.7 ML (ref 18–58)
ECHO LA VOL/BSA BIPLANE: 26.44 ML/M2 (ref 16–28)
ECHO LA VOLUME INDEX A2C: 25.86 ML/M2 (ref 16–28)
ECHO LA VOLUME INDEX A4C: 23.26 ML/M2 (ref 16–28)
ECHO LV E' LATERAL VELOCITY: 5.62 CM/S
ECHO LV E' SEPTAL VELOCITY: 4.98 CM/S
ECHO LV EDV A2C: 75.47 ML
ECHO LV EDV A4C: 99.47 ML
ECHO LV EDV BP: 88.86 ML (ref 67–155)
ECHO LV EDV INDEX A4C: 56.3 ML/M2
ECHO LV EDV INDEX BP: 50.3 ML/M2
ECHO LV EDV NDEX A2C: 42.7 ML/M2
ECHO LV EJECTION FRACTION A2C: 70 PERCENT
ECHO LV EJECTION FRACTION A4C: 57 PERCENT
ECHO LV EJECTION FRACTION BIPLANE: 65 PERCENT (ref 55–100)
ECHO LV ESV A2C: 22.42 ML
ECHO LV ESV A4C: 42.32 ML
ECHO LV ESV BP: 31.11 ML (ref 22–58)
ECHO LV ESV INDEX A2C: 12.7 ML/M2
ECHO LV ESV INDEX A4C: 24 ML/M2
ECHO LV ESV INDEX BP: 17.6 ML/M2
ECHO LV INTERNAL DIMENSION DIASTOLIC: 4.24 CM (ref 4.2–5.9)
ECHO LV INTERNAL DIMENSION SYSTOLIC: 2.79 CM
ECHO LV IVSD: 0.92 CM (ref 0.6–1)
ECHO LV MASS 2D: 128.8 G (ref 88–224)
ECHO LV MASS INDEX 2D: 73 G/M2 (ref 49–115)
ECHO LV POSTERIOR WALL DIASTOLIC: 0.97 CM (ref 0.6–1)
ECHO LVOT DIAM: 2.28 CM
ECHO LVOT PEAK GRADIENT: 3.92 MMHG
ECHO LVOT PEAK VELOCITY: 99.03 CM/S
ECHO LVOT SV: 76.6 ML
ECHO LVOT VTI: 18.75 CM
ECHO MV A VELOCITY: 92.34 CM/S
ECHO MV E DECELERATION TIME (DT): 181.23 MS
ECHO MV E VELOCITY: 78.23 CM/S
ECHO MV E/A RATIO: 0.85
ECHO MV E/E' LATERAL: 13.92
ECHO MV E/E' RATIO (AVERAGED): 14.81
ECHO MV E/E' SEPTAL: 15.71
ECHO MV MAX VELOCITY: 85.09 CM/S
ECHO MV MEAN GRADIENT: 1.4 MMHG
ECHO MV PEAK GRADIENT: 2.9 MMHG
ECHO MV PRESSURE HALF TIME (PHT): 52.56 MS
ECHO MV VTI: 20.35 CM
ECHO RA AREA 4C: 14.46 CM2
ECHO RV INTERNAL DIMENSION: 3.65 CM
ECHO RV TAPSE: 1.56 CM (ref 1.5–2)

## 2021-04-27 PROCEDURE — 93306 TTE W/DOPPLER COMPLETE: CPT | Performed by: SPECIALIST

## 2021-04-27 PROCEDURE — 99214 OFFICE O/P EST MOD 30 MIN: CPT | Performed by: NURSE PRACTITIONER

## 2021-04-27 NOTE — PATIENT INSTRUCTIONS
Continue your current meds  Follow-up in 6mo or as needed  If having recurrent chest pain, call to come in sooner.

## 2021-04-27 NOTE — LETTER
4/27/2021 Patient: Zina Godoy YOB: 1965 Date of Visit: 4/27/2021 Alysha Wang MD 
[address] [facility] [address] [facility] [phone] [address] [employer] Dear Alysha Wang MD, Thank you for referring Mr. Zina Godoy to [address]  for evaluation. My notes for this consultation are attached. If you have questions, please do not hesitate to call me. I look forward to following your patient along with you.  
 
 
Sincerely, 
 
Sherrie Ortiz NP

## 2021-04-27 NOTE — PROGRESS NOTES
Room 9    Review Echo results  Chest pains aren't as bad as they were     Visit Vitals  /70   Pulse 87   Ht 5' 2\" (1.575 m)   Wt 166 lb (75.3 kg)   BMI 30.36 kg/m²       Chest pain: no  Shortness of breath: no  Edema: no  Palpitations: no  Dizziness: no    New diagnosis/Surgeries: no    ER/Hospitalizations: no    Refills: no

## 2021-04-27 NOTE — PROGRESS NOTES
Jose Champagne, ADRIANNE  [address], [facility], [address] [facility]    Office [phone]  Fax [phone]      Patient: Arlet Badillo  : 1965      Today's Date: 2021          HISTORY OF PRESENT ILLNESS:     History of Present Illness:  Here for f/u of CP and blood pressure. BB increased last visit and pt started on norvasc. Echo today (results pending). Pressures improved and CP resolved. Pt is feeling better. Home BP log reviewed - presures mostly 120s-140s/70s-80s. Patient denies any breathing issues, palpitations, syncope, orthopnea, edema, or paroxysmal nocturnal dyspnea. Has long hx of dizziness/vertigo which is stable. PAST MEDICAL HISTORY:     Past Medical History:   Diagnosis Date    CAD (coronary artery disease)     Diabetes ([address]     Hx of CABG     Aug 2013 at [place] in [place]     Hypercholesterolemia     Hypertension          Past Surgical History:   Procedure Laterality Date    HX CORONARY ARTERY BYPASS GRAFT  2013    HX HEART CATHETERIZATION  2019    HX HEENT Left     eye vitrectomy    HX VITRECTOMY Left     AL CABG, ARTERY-VEIN, THREE             MEDICATIONS:     Current Outpatient Medications   Medication Sig Dispense Refill    losartan-hydroCHLOROthiazide (HYZAAR) 100-25 mg per tablet TAKE 1 TABLET BY MOUTH EVERY DAY IN THE MORNING INDICATIONS: HIGH BLOOD PRESSURE 90 Tab 1    amLODIPine (NORVASC) 5 mg tablet Take 1 Tab by mouth daily. 90 Tab 3    metoprolol succinate (TOPROL-XL) 200 mg XL tablet Take 1 Tab by mouth daily. 90 Tab 3    metFORMIN (GLUCOPHAGE) 500 mg tablet TAKE 2 TABLET BY MOUTH 2 TIMES EVERY DAY WITH MORNING AND EVENING MEALS 360 Tab 1    glipiZIDE SR (GLUCOTROL XL) 5 mg CR tablet TAKE 2 TABLET BY MOUTH 2 TIMES EVERY DAY WITH BREAKFAST AND DINNER  Indications: type 2 diabetes mellitus 360 Tab 1    rosuvastatin (CRESTOR) 20 mg tablet Take 1 Tab by mouth nightly.  90 Tab 3    clopidogreL (PLAVIX) 75 mg tab TAKE 1 TABLET BY MOUTH EVERY DAY INDICATIONS: TREATMENT TO PREVENT A HEART ATTACK 90 Tab 3    isosorbide mononitrate ER (IMDUR) 30 mg tablet TAKE 1 TABLET BY MOUTH EVERY DAY IN THE MORNING 90 Tab 1    aspirin delayed-release 81 mg tablet Take  by mouth daily.  multivitamin (ONE A DAY) tablet Take 1 Tab by mouth daily. Allergies   Allergen Reactions    Lisinopril Cough             SOCIAL HISTORY:     Social History     Tobacco Use    Smoking status: Never Smoker    Smokeless tobacco: Never Used   Substance Use Topics    Alcohol use: Yes     Alcohol/week: 20.0 standard drinks     Types: 20 Shots of liquor per week     Frequency: 4 or more times a week     Drinks per session: 3 or 4     Binge frequency: Less than monthly     Comment: whisky     Drug use: Never         FAMILY HISTORY:     Family History   Problem Relation Age of Onset    Diabetes Father     Diabetes Sister             REVIEW OF SYMPTOMS:      Review of Symptoms:  Constitutional: Negative for fever, chills  HEENT: Negative for nosebleeds, tinnitus, and vision changes. Respiratory: Negative for cough, wheezing  Cardiovascular: Negative for orthopnea, claudication, syncope, and PND. Gastrointestinal: Negative for abdominal pain, diarrhea, melena. Genitourinary: Negative for dysuria  Musculoskeletal: Negative for myalgias. Skin: Negative for rash  Heme: No problems bleeding. Neurological: Negative for speech change and focal weakness.            PHYSICAL EXAM:      Physical Exam:  Visit Vitals  /70   Pulse 87   Ht 5' 2\" (1.575 m)   Wt 166 lb (75.3 kg)   BMI 30.36 kg/m²     Patient appears generally well, mood and affect are appropriate and pleasant. HEENT:  Hearing intact, non-icteric, normocephalic, atraumatic. Neck Exam: Supple, No JVD    Lung Exam: Clear to auscultation, even breath sounds.    Cardiac Exam: Regular rate and rhythm with no murmur or rub  Abdomen: Soft, non-tender, normal bowel sounds. Extremities: Moves all ext well. No lower extremity edema. Psych: Appropriate affect  Neuro - Grossly intact        LABS / OTHER STUDIES:      Lab Results   Component Value Date/Time    Sodium 138 02/04/2021 10:29 AM    Potassium 4.7 02/04/2021 10:29 AM    Chloride 105 02/04/2021 10:29 AM    CO2 30 02/04/2021 10:29 AM    Anion gap 3 (L) 02/04/2021 10:29 AM    Glucose 208 (H) 02/04/2021 10:29 AM    BUN 18 02/04/2021 10:29 AM    Creatinine 1.14 02/04/2021 10:29 AM    BUN/Creatinine ratio 16 02/04/2021 10:29 AM    GFR est AA >60 02/04/2021 10:29 AM    GFR est non-AA >60 02/04/2021 10:29 AM    Calcium 9.3 02/04/2021 10:29 AM    Bilirubin, total 1.0 02/04/2021 10:29 AM    Alk. phosphatase 68 02/04/2021 10:29 AM    Protein, total 6.5 02/04/2021 10:29 AM    Albumin 3.9 02/04/2021 10:29 AM    Globulin 2.6 02/04/2021 10:29 AM    A-G Ratio 1.5 02/04/2021 10:29 AM    ALT (SGPT) 50 02/04/2021 10:29 AM    AST (SGOT) 36 02/04/2021 10:29 AM       Lab Results   Component Value Date/Time    WBC 3.4 (L) 02/04/2021 10:29 AM    HGB 12.4 02/04/2021 10:29 AM    HCT 36.2 (L) 02/04/2021 10:29 AM    PLATELET 089 (L) 30/62/8045 10:29 AM    MCV 92.6 02/04/2021 10:29 AM       Lab Results   Component Value Date/Time    Cholesterol, total 132 02/04/2021 10:29 AM    HDL Cholesterol 54 02/04/2021 10:29 AM    LDL, calculated 32.6 02/04/2021 10:29 AM    VLDL, calculated 45.4 02/04/2021 10:29 AM    Triglyceride 227 (H) 02/04/2021 10:29 AM    CHOL/HDL Ratio 2.4 02/04/2021 10:29 AM       Lab Results   Component Value Date/Time    TSH 1.82 02/04/2021 10:29 AM      CARDIAC DIAGNOSTICS:      Cardiac Evaluation Includes:     Exercise cardiolite 5/1/19 - walked 8:30, no ischemic EKG changes, normal MPI. Stress EF 48%     Cardiac Cath 5/31/19 - LAD occluded. LIMA to LAD patent. Occluded SVG to Diag. Occluded SVG to OM. Distal PLB disease (small vessel).  LVEF 55%  ---> medical management recommended      EKG 1/14/20 - NSR, normal   EKG 3/11/21 - NSR, normal         ASSESSMENT AND PLAN:      Assessment and Plan:     1) CAD / CABG  - He has history of CABG in 2013. He saw Dr. Lee Ann Ibrara in [address] in 2019 - says he had a nuclear stress test and then a cath - had some small vessel blockages and treated medically. - has chronic class 2-3 angina (pain walking up hills or exerting himself)   - On 3/11/21 - continues with exertional chest pain - not much change past 2 years ---> Will increase metoprolol to 200 mg once daily (from 175 mg he takes). Add Norvasc 5 mg daily. Cont statin, Imdur, ARB. - He is on DAPT   - on 4/27/21 - CP improved with recent med changes - Echo results pending. Will cont current meds including plavix for now. If CP remains resolved next visit, dc plavix.    2) HTN   - BP normotensive on current meds  - Gooal < 130/80      3) Dyslipidemia  - Cont Crestor 20 mg daily      4) Etoh - admits drinking too much -- discussed cutting back last visit with Dr. Cindy Silva    5) DM    6)  f/u in 6mo or PRN (Pt advised to call if CP returns or any issues with breathing). Patient expressed understanding of the plan - questions were answered. Moved from [place] to [place] in 1984. Retired from [employer] 2019. Family in [place].   Single.       Davidharmony PedroLuca, ANP

## 2021-04-30 ENCOUNTER — TELEPHONE (OUTPATIENT)
Dept: CARDIOLOGY CLINIC | Age: 56
End: 2021-04-30

## 2021-04-30 NOTE — PROGRESS NOTES
Addendum:  Echo 4/27/21 - LVEF 65%. No WMA. G1DD.    Pt continues to feel well - will dc plavix - cont aspirin.    ______________________________________________________

## 2021-04-30 NOTE — TELEPHONE ENCOUNTER
Called pt - reviewed echo below. No sig abnormalities. Pt denies CP. Will dc plavix - cont aspirin. Pt agreed. 04/27/21   ECHO ADULT COMPLETE 04/27/2021 4/27/2021    Narrative · LV: Calculated LVEF is 65%. Biplane method used to measure ejection   fraction. Normal cavity size, wall thickness and systolic function   (ejection fraction normal). Wall motion: normal. Mild (grade 1) left   ventricular diastolic dysfunction.         Signed by: Luis Carlos Berkowitz MD

## 2021-06-18 DIAGNOSIS — I25.10 CORONARY ARTERY DISEASE INVOLVING NATIVE CORONARY ARTERY OF NATIVE HEART: ICD-10-CM

## 2021-06-18 DIAGNOSIS — I25.10 CORONARY ARTERY DISEASE INVOLVING NATIVE CORONARY ARTERY OF NATIVE HEART WITHOUT ANGINA PECTORIS: ICD-10-CM

## 2021-06-18 DIAGNOSIS — I10 ESSENTIAL HYPERTENSION: Primary | ICD-10-CM

## 2021-06-18 RX ORDER — ISOSORBIDE MONONITRATE 30 MG/1
TABLET, EXTENDED RELEASE ORAL
Qty: 90 TABLET | Refills: 1 | Status: SHIPPED | OUTPATIENT
Start: 2021-06-18 | End: 2021-12-16

## 2021-08-01 DIAGNOSIS — E11.69 TYPE 2 DIABETES MELLITUS WITH OTHER SPECIFIED COMPLICATION, WITHOUT LONG-TERM CURRENT USE OF INSULIN (HCC): ICD-10-CM

## 2021-08-01 RX ORDER — GLIPIZIDE 5 MG/1
TABLET, FILM COATED, EXTENDED RELEASE ORAL
Qty: 360 TABLET | Refills: 1 | Status: SHIPPED | OUTPATIENT
Start: 2021-08-01 | End: 2022-02-09

## 2021-08-01 RX ORDER — METFORMIN HYDROCHLORIDE 500 MG/1
TABLET ORAL
Qty: 360 TABLET | Refills: 1 | Status: SHIPPED | OUTPATIENT
Start: 2021-08-01

## 2021-09-08 ENCOUNTER — OFFICE VISIT (OUTPATIENT)
Dept: FAMILY MEDICINE CLINIC | Age: 56
End: 2021-09-08
Payer: COMMERCIAL

## 2021-09-08 VITALS
DIASTOLIC BLOOD PRESSURE: 88 MMHG | SYSTOLIC BLOOD PRESSURE: 138 MMHG | OXYGEN SATURATION: 98 % | TEMPERATURE: 97.6 F | WEIGHT: 169 LBS | HEIGHT: 62 IN | RESPIRATION RATE: 18 BRPM | BODY MASS INDEX: 31.1 KG/M2 | HEART RATE: 86 BPM

## 2021-09-08 DIAGNOSIS — E11.21 TYPE 2 DIABETES WITH NEPHROPATHY (HCC): Primary | ICD-10-CM

## 2021-09-08 DIAGNOSIS — E78.00 HYPERCHOLESTEROLEMIA: ICD-10-CM

## 2021-09-08 DIAGNOSIS — I10 ESSENTIAL HYPERTENSION: ICD-10-CM

## 2021-09-08 DIAGNOSIS — I25.10 CORONARY ARTERY DISEASE INVOLVING NATIVE CORONARY ARTERY OF NATIVE HEART, UNSPECIFIED WHETHER ANGINA PRESENT: ICD-10-CM

## 2021-09-08 DIAGNOSIS — H66.90 EAR INFECTION: ICD-10-CM

## 2021-09-08 DIAGNOSIS — Z79.899 ENCOUNTER FOR LONG-TERM CURRENT USE OF MEDICATION: ICD-10-CM

## 2021-09-08 PROCEDURE — 99214 OFFICE O/P EST MOD 30 MIN: CPT | Performed by: FAMILY MEDICINE

## 2021-09-08 RX ORDER — OFLOXACIN 3 MG/ML
SOLUTION AURICULAR (OTIC)
COMMUNITY
Start: 2021-08-13

## 2021-09-08 RX ORDER — LOSARTAN POTASSIUM AND HYDROCHLOROTHIAZIDE 25; 100 MG/1; MG/1
TABLET ORAL
Qty: 90 TABLET | Refills: 1 | Status: SHIPPED | OUTPATIENT
Start: 2021-09-08 | End: 2022-03-05

## 2021-09-08 RX ORDER — AMLODIPINE BESYLATE 10 MG/1
1 TABLET ORAL
COMMUNITY
Start: 2021-09-07 | End: 2021-11-02 | Stop reason: SDUPTHER

## 2021-09-08 NOTE — PROGRESS NOTES
Identified pt with two pt identifiers(name and ). No chief complaint on file. Health Maintenance Due   Topic    Shingrix Vaccine Age 50> (1 of 2)    Flu Vaccine (1)       Wt Readings from Last 3 Encounters:   21 169 lb (76.7 kg)   21 166 lb (75.3 kg)   21 166 lb (75.3 kg)     Temp Readings from Last 3 Encounters:   21 97.6 °F (36.4 °C) (Temporal)   21 98.2 °F (36.8 °C) (Oral)   20 97.4 °F (36.3 °C) (Oral)     BP Readings from Last 3 Encounters:   21 138/88   21 118/70   21 118/70     Pulse Readings from Last 3 Encounters:   21 86   21 87   21 96         Learning Assessment:  :     Learning Assessment 2019   PRIMARY LEARNER Patient Patient   HIGHEST LEVEL OF EDUCATION - PRIMARY LEARNER  GRADUATED HIGH SCHOOL OR GED GRADUATED HIGH SCHOOL OR GED   BARRIERS PRIMARY LEARNER NONE NONE   CO-LEARNER CAREGIVER No No   PRIMARY LANGUAGE ENGLISH ENGLISH   LEARNER PREFERENCE PRIMARY LISTENING READING     DEMONSTRATION DEMONSTRATION   ANSWERED BY patient self   RELATIONSHIP SELF SELF       Depression Screening:  :     3 most recent PHQ Screens 2021   Little interest or pleasure in doing things Not at all   Feeling down, depressed, irritable, or hopeless Not at all   Total Score PHQ 2 0       Fall Risk Assessment:  :     No flowsheet data found. Abuse Screening:  :     Abuse Screening Questionnaire 2019   Do you ever feel afraid of your partner? N N N N   Are you in a relationship with someone who physically or mentally threatens you? N N N N   Is it safe for you to go home?  Y Y Y Y       Coordination of Care Questionnaire:  :     1) Have you been to an emergency room, urgent care clinic since your last visit? no   Hospitalized since your last visit? no             2) Have you seen or consulted any other health care providers outside of [address] [place] [address] since your last visit? no (Include any pap smears or colon screenings in this section.)    3) Do you have an Advance Directive on file? no  Are you interested in receiving information about Advance Directives? no    Patient is accompanied by self. Reviewed record in preparation for visit and have obtained necessary documentation. Medication reconciliation up to date and corrected with patient at this time.

## 2021-09-08 NOTE — PROGRESS NOTES
Subjective:     Lisa Rasheed is a 60 y.o. male who presents for follow up of diabetes, hypertension, hyperlipidemia and coronary artery disease. Diet and Lifestyle: generally follows a low fat low cholesterol diet, generally follows a low sodium diet, nonsmoker  Home BP Monitoring: is not measured at home    Cardiovascular ROS: taking medications as instructed, no medication side effects noted, no TIA's, no chest pain on exertion, no dyspnea on exertion, no swelling of ankles. New concerns: R ear problems 2 months since after swimming. Hx recent infection in July. ENT Dr Refugio Snider placed tube in R ear. Used ear drops. Still hears crackling, plugged up. Last week had few days of vertigo. Occasional ringing. no hx imaging. Patient Active Problem List    Diagnosis Date Noted    Type 2 diabetes with nephropathy ([facility] 07/29/2020    Encounter for long-term current use of medication 07/29/2020    CAD (coronary artery disease)     Hx of CABG     Diabetes ([facility] 12/11/2019    Hypertension 12/11/2019    Hypercholesterolemia 12/11/2019    Coronary artery disease involving native coronary artery of native heart 12/11/2019     Current Outpatient Medications   Medication Sig Dispense Refill    amLODIPine (NORVASC) 10 mg tablet Take 1 Tablet by mouth once over twenty-four (24) hours.  ofloxacin (FLOXIN) 0.3 % otic solution 4 DROPS TWICE A DAY AS NEEDED AFFECTED EAR(S)      losartan-hydroCHLOROthiazide (HYZAAR) 100-25 mg per tablet TAKE 1 TABLET BY MOUTH EVERY DAY IN THE MORNING INDICATIONS: HIGH BLOOD PRESSURE 90 Tablet 1    glipiZIDE SR (GLUCOTROL XL) 5 mg CR tablet TAKE 2 TABLETS BY MOUTH TWICE A DAY WITH BREAKFAST AND DINNER FOR TYPE 2 DIABETES MELLITUS 360 Tablet 1    isosorbide mononitrate ER (IMDUR) 30 mg tablet TAKE 1 TABLET BY MOUTH EVERY DAY IN THE MORNING 90 Tablet 1    metoprolol succinate (TOPROL-XL) 200 mg XL tablet Take 1 Tab by mouth daily.  90 Tab 3    rosuvastatin (CRESTOR) 20 mg tablet Take 1 Tab by mouth nightly. 90 Tab 3    aspirin delayed-release 81 mg tablet Take  by mouth daily.  multivitamin (ONE A DAY) tablet Take 1 Tab by mouth daily.  metFORMIN (GLUCOPHAGE) 500 mg tablet TAKE 2 TABLETS BY MOUTH TWICE A DAY WITH MORNING AND EVENING MEALS (Patient taking differently: Take 500 mg by mouth two (2) times daily (with meals). TAKE 2 TABLETS BY MOUTH TWICE A DAY WITH MORNING AND EVENING MEALS (pt reports only taking one 500 mg tablet BID d/t s/e of diarrhea)) 360 Tablet 1     Allergies   Allergen Reactions    Lisinopril Cough     Past Medical History:   Diagnosis Date    CAD (coronary artery disease)     [facility] ([facility]     Hx of CABG     Aug 2013 at [place] in [address] Hypercholesterolemia     Hypertension      Past Surgical History:   Procedure Laterality Date    HX CORONARY ARTERY BYPASS GRAFT  08/28/2013    HX HEART CATHETERIZATION  05/2019    HX HEENT Left 1990's    eye vitrectomy    HX VITRECTOMY Left 1997    [place] CABG, ARTERY-VEIN, THREE  2013     Family History   Problem Relation Age of Onset    No Known Problems Mother     Diabetes Father     Diabetes Sister      Social History     Tobacco Use    Smoking status: Never Smoker    Smokeless tobacco: Never Used   Substance Use Topics    Alcohol use: Yes     Alcohol/week: 20.0 standard drinks     Types: 20 Shots of liquor per week     Comment: whisky              Review of Systems, additional:  Pertinent items are noted in HPI. Objective:     Visit Vitals  /88 (BP 1 Location: Left upper arm, BP Patient Position: At rest, BP Cuff Size: Large adult)   Pulse 86   Temp 97.6 °F (36.4 °C) (Temporal)   Resp 18   Ht 5' 2\" (1.575 m)   Wt 169 lb (76.7 kg)   SpO2 98%   BMI 30.91 kg/m²     Appearance: alert, well appearing, and in no distress and overweight.   General exam: ENT clear canals, R tymp tube in place, no infection  [facility] exam BP noted to be well controlled today in office, S1, S2 normal, no gallop, no murmur, chest clear, no JVD, no HSM, no edema. Lab review: orders written for new lab studies as appropriate; see orders. Assessment/Plan:     . Charlotte Brittle ICD-10-CM ICD-9-CM    1. Type 2 diabetes with nephropathy (HCC)  E11.21 250.40 MICROALBUMIN, UR, RAND W/ MICROALB/CREAT RATIO     583.81 HEMOGLOBIN A1C WITH EAG   2. Essential hypertension  I10 401.9 TSH 3RD GENERATION   3. Hypercholesterolemia  E78.00 272.0 LIPID PANEL   4. Coronary artery disease involving native coronary artery of native heart, unspecified whether angina present  I25.10 414.01    5. Encounter for long-term current use of medication  Z79.899 V58.69 CBC WITH AUTOMATED DIFF      METABOLIC PANEL, COMPREHENSIVE   6. Ear infection  H66.90 382.9      Order labs. FU ENT for ear sx. He may need imaging done.

## 2021-09-08 NOTE — PATIENT INSTRUCTIONS
Counting Carbohydrates: Care Instructions  Your Care Instructions     You don't have to eat special foods when you have diabetes. You just have to be careful to eat healthy foods. Carbohydrates (carbs) raise blood sugar higher and quicker than any other nutrient. Carbs are found in desserts, breads and cereals, and fruit. They're also in starchy vegetables. These include potatoes, corn, and grains such as rice and pasta. Carbs are also in milk and yogurt. The more carbs you eat at one time, the higher your blood sugar will rise. Spreading carbs all through the day helps keep your blood sugar levels within your target range. Counting carbs is one of the best ways to keep your blood sugar under control. If you use insulin, counting carbs helps you match the right amount of insulin to the number of grams of carbs in a meal. Then you can change your diet and insulin dose as needed. Testing your blood sugar several times a day can help you learn how carbs affect your blood sugar. A registered dietitian or certified diabetes educator can help you plan meals and snacks. Follow-up care is a key part of your treatment and safety. Be sure to make and go to all appointments, and call your doctor if you are having problems. It's also a good idea to know your test results and keep a list of the medicines you take. How can you care for yourself at home? Know your daily amount of carbohydrates  Your daily amount depends on several things, such as your weight, how active you are, which diabetes medicines you take, and what your goals are for your blood sugar levels. A registered dietitian or certified diabetes educator can help you plan how many carbs to include in each meal and snack. For most adults, a guideline for the daily amount of carbs is:  · 45 to 60 grams at each meal. That's about the same as 3 to 4 carbohydrate servings. · 15 to 20 grams at each snack.  That's about the same as 1 carbohydrate serving. Count carbs  Counting carbs lets you know how much rapid-acting insulin to take before you eat. If you use an insulin pump, you get a constant rate of insulin during the day. So the pump must be programmed at meals. This gives you extra insulin to cover the rise in blood sugar after meals. If you take insulin:  · Learn your own insulin-to-carb ratio. You and your diabetes health professional will figure out the ratio. You can do this by testing your blood sugar after meals. For example, you may need a certain amount of insulin for every 15 grams of carbs. · Add up the carb grams in a meal. Then you can figure out how many units of insulin to take based on your insulin-to-carb ratio. · Exercise lowers blood sugar. You can use less insulin than you would if you were not doing exercise. Keep in mind that timing matters. If you exercise within 1 hour after a meal, your body may need less insulin for that meal than it would if you exercised 3 hours after the meal. Test your blood sugar to find out how exercise affects your need for insulin. If you do or don't take insulin:  · Look at labels on packaged foods. This can tell you how many carbs are in a serving. You can also use guides from the American Diabetes Association. · Be aware of portions, or serving sizes. If a package has two servings and you eat the whole package, you need to double the number of grams of carbohydrate listed for one serving. · Protein, fat, and fiber do not raise blood sugar as much as carbs do. If you eat a lot of these nutrients in a meal, your blood sugar will rise more slowly than it would otherwise. Eat from all food groups  · Eat at least three meals a day. · Plan meals to include food from all the food groups. The food groups include grains, fruits, dairy, proteins, and vegetables. · Talk to your dietitian or diabetes educator about ways to add limited amounts of sweets into your meal plan.   · If you drink alcohol, talk to your doctor. It may not be recommended when you are taking certain diabetes medicines. Where can you learn more? Go to http://www.gray.com/  Enter G703 in the search box to learn more about \"Counting Carbohydrates: Care Instructions. \"  Current as of: August 31, 2020               Content Version: 12.8  © 7694-9353 [employer]. Care instructions adapted under license by [employer] (which disclaims liability or warranty for this information). If you have questions about a medical condition or this instruction, always ask your healthcare professional. [address] any warranty or liability for your use of this information.

## 2021-09-14 ENCOUNTER — LAB ONLY (OUTPATIENT)
Dept: FAMILY MEDICINE CLINIC | Age: 56
End: 2021-09-14

## 2021-09-14 DIAGNOSIS — E11.21 TYPE 2 DIABETES WITH NEPHROPATHY (HCC): ICD-10-CM

## 2021-09-14 DIAGNOSIS — Z79.899 ENCOUNTER FOR LONG-TERM CURRENT USE OF MEDICATION: ICD-10-CM

## 2021-09-14 DIAGNOSIS — I10 ESSENTIAL HYPERTENSION: ICD-10-CM

## 2021-09-14 DIAGNOSIS — E78.00 HYPERCHOLESTEROLEMIA: ICD-10-CM

## 2021-09-15 LAB
ALBUMIN SERPL-MCNC: 3.6 G/DL (ref 3.5–5)
ALBUMIN/GLOB SERPL: 1.2 {RATIO} (ref 1.1–2.2)
ALP SERPL-CCNC: 64 U/L (ref 45–117)
ALT SERPL-CCNC: 42 U/L (ref 12–78)
ANION GAP SERPL CALC-SCNC: 6 MMOL/L (ref 5–15)
AST SERPL-CCNC: 26 U/L (ref 15–37)
BASOPHILS # BLD: 0 K/UL (ref 0–0.1)
BASOPHILS NFR BLD: 1 % (ref 0–1)
BILIRUB SERPL-MCNC: 0.7 MG/DL (ref 0.2–1)
BUN SERPL-MCNC: 21 MG/DL (ref 6–20)
BUN/CREAT SERPL: 19 (ref 12–20)
CALCIUM SERPL-MCNC: 9.6 MG/DL (ref 8.5–10.1)
CHLORIDE SERPL-SCNC: 106 MMOL/L (ref 97–108)
CHOLEST SERPL-MCNC: 149 MG/DL
CO2 SERPL-SCNC: 26 MMOL/L (ref 21–32)
CREAT SERPL-MCNC: 1.09 MG/DL (ref 0.7–1.3)
CREAT UR-MCNC: 141 MG/DL
DIFFERENTIAL METHOD BLD: ABNORMAL
EOSINOPHIL # BLD: 0.2 K/UL (ref 0–0.4)
EOSINOPHIL NFR BLD: 4 % (ref 0–7)
ERYTHROCYTE [DISTWIDTH] IN BLOOD BY AUTOMATED COUNT: 13.2 % (ref 11.5–14.5)
EST. AVERAGE GLUCOSE BLD GHB EST-MCNC: 154 MG/DL
GLOBULIN SER CALC-MCNC: 3.1 G/DL (ref 2–4)
GLUCOSE SERPL-MCNC: 239 MG/DL (ref 65–100)
HBA1C MFR BLD: 7 % (ref 4–5.6)
HCT VFR BLD AUTO: 36.3 % (ref 36.6–50.3)
HDLC SERPL-MCNC: 54 MG/DL
HDLC SERPL: 2.8 {RATIO} (ref 0–5)
HGB BLD-MCNC: 11.9 G/DL (ref 12.1–17)
IMM GRANULOCYTES # BLD AUTO: 0 K/UL (ref 0–0.04)
IMM GRANULOCYTES NFR BLD AUTO: 1 % (ref 0–0.5)
LDLC SERPL CALC-MCNC: 54.8 MG/DL (ref 0–100)
LYMPHOCYTES # BLD: 0.8 K/UL (ref 0.8–3.5)
LYMPHOCYTES NFR BLD: 23 % (ref 12–49)
MCH RBC QN AUTO: 31 PG (ref 26–34)
MCHC RBC AUTO-ENTMCNC: 32.8 G/DL (ref 30–36.5)
MCV RBC AUTO: 94.5 FL (ref 80–99)
MICROALBUMIN UR-MCNC: 318 MG/DL
MICROALBUMIN/CREAT UR-RTO: 2255 MG/G (ref 0–30)
MONOCYTES # BLD: 0.5 K/UL (ref 0–1)
MONOCYTES NFR BLD: 13 % (ref 5–13)
NEUTS SEG # BLD: 2.2 K/UL (ref 1.8–8)
NEUTS SEG NFR BLD: 58 % (ref 32–75)
NRBC # BLD: 0 K/UL (ref 0–0.01)
NRBC BLD-RTO: 0 PER 100 WBC
PLATELET # BLD AUTO: 137 K/UL (ref 150–400)
PMV BLD AUTO: 12.3 FL (ref 8.9–12.9)
POTASSIUM SERPL-SCNC: 4.9 MMOL/L (ref 3.5–5.1)
PROT SERPL-MCNC: 6.7 G/DL (ref 6.4–8.2)
RBC # BLD AUTO: 3.84 M/UL (ref 4.1–5.7)
SODIUM SERPL-SCNC: 138 MMOL/L (ref 136–145)
TRIGL SERPL-MCNC: 201 MG/DL (ref ?–150)
TSH SERPL DL<=0.05 MIU/L-ACNC: 1.94 UIU/ML (ref 0.36–3.74)
VLDLC SERPL CALC-MCNC: 40.2 MG/DL
WBC # BLD AUTO: 3.7 K/UL (ref 4.1–11.1)

## 2021-11-02 ENCOUNTER — OFFICE VISIT (OUTPATIENT)
Dept: CARDIOLOGY CLINIC | Age: 56
End: 2021-11-02
Payer: COMMERCIAL

## 2021-11-02 VITALS
OXYGEN SATURATION: 99 % | BODY MASS INDEX: 32.76 KG/M2 | WEIGHT: 178 LBS | HEART RATE: 86 BPM | SYSTOLIC BLOOD PRESSURE: 132 MMHG | DIASTOLIC BLOOD PRESSURE: 80 MMHG | HEIGHT: 62 IN

## 2021-11-02 DIAGNOSIS — I10 PRIMARY HYPERTENSION: ICD-10-CM

## 2021-11-02 DIAGNOSIS — I25.10 CORONARY ARTERY DISEASE INVOLVING NATIVE CORONARY ARTERY OF NATIVE HEART WITHOUT ANGINA PECTORIS: Primary | ICD-10-CM

## 2021-11-02 PROCEDURE — 99214 OFFICE O/P EST MOD 30 MIN: CPT | Performed by: SPECIALIST

## 2021-11-02 RX ORDER — AMLODIPINE BESYLATE 10 MG/1
10 TABLET ORAL
Qty: 90 TABLET | Refills: 3
Start: 2021-11-02 | End: 2022-02-03

## 2021-11-02 RX ORDER — AMLODIPINE BESYLATE 10 MG/1
5 TABLET ORAL
Qty: 90 TABLET | Refills: 3
Start: 2021-11-02 | End: 2021-11-02 | Stop reason: SDUPTHER

## 2021-11-02 NOTE — PROGRESS NOTES
Linda Olsen is a 60 y.o. male    Visit Vitals  /80 (BP 1 Location: Left upper arm, BP Patient Position: Sitting, BP Cuff Size: Adult)   Pulse 86   Ht 5' 2\" (1.575 m)   Wt 178 lb (80.7 kg)   SpO2 99%   BMI 32.56 kg/m²       Chief Complaint   Patient presents with    Cholesterol Problem    Coronary Artery Disease    Hypertension    Other     Hx CABG       Chest pain NO  SOB NO  Dizziness NO  Swelling LEFT FOOT, CALF, AND ANKLE  Recent hospital visit NO  Refills NO  COVID VACCINE STATUS YES  HAD COVID?  NO

## 2021-11-02 NOTE — PROGRESS NOTES
Shelly Lua MD. [facility]              Patient: Tk Valencia  : 1965      Today's Date: 2021            HISTORY OF PRESENT ILLNESS:     History of Present Illness:  Here for follow-up. He has gained some weight with mild edema in legs past few months. Breathing is OK. RAMIREZ only with steep hills. No CP. PAST MEDICAL HISTORY:     Past Medical History:   Diagnosis Date    CAD (coronary artery disease)     Diabetes ([address]     Hx of CABG     Aug 2013 at [place] in [address] Hypercholesterolemia     Hypertension          Past Surgical History:   Procedure Laterality Date    HX CORONARY ARTERY BYPASS GRAFT  2013    HX HEART CATHETERIZATION  2019    HX HEENT Left 's    eye vitrectomy    HX VITRECTOMY Left     MA CABG, ARTERY-VEIN, THREE             MEDICATIONS:     Current Outpatient Medications   Medication Sig Dispense Refill    amLODIPine (NORVASC) 10 mg tablet Take 1 Tablet by mouth once over twenty-four (24) hours.  ofloxacin (FLOXIN) 0.3 % otic solution 4 DROPS TWICE A DAY AS NEEDED AFFECTED EAR(S)      losartan-hydroCHLOROthiazide (HYZAAR) 100-25 mg per tablet TAKE 1 TABLET BY MOUTH EVERY DAY IN THE MORNING INDICATIONS: HIGH BLOOD PRESSURE 90 Tablet 1    glipiZIDE SR (GLUCOTROL XL) 5 mg CR tablet TAKE 2 TABLETS BY MOUTH TWICE A DAY WITH BREAKFAST AND DINNER FOR TYPE 2 DIABETES MELLITUS 360 Tablet 1    metFORMIN (GLUCOPHAGE) 500 mg tablet TAKE 2 TABLETS BY MOUTH TWICE A DAY WITH MORNING AND EVENING MEALS (Patient taking differently: Take 500 mg by mouth two (2) times daily (with meals). TAKE 2 TABLETS BY MOUTH TWICE A DAY WITH MORNING AND EVENING MEALS (pt reports only taking one 500 mg tablet BID d/t s/e of diarrhea)) 360 Tablet 1    isosorbide mononitrate ER (IMDUR) 30 mg tablet TAKE 1 TABLET BY MOUTH EVERY DAY IN THE MORNING 90 Tablet 1    metoprolol succinate (TOPROL-XL) 200 mg XL tablet Take 1 Tab by mouth daily.  90 Tab 3    rosuvastatin (CRESTOR) 20 mg tablet Take 1 Tab by mouth nightly. 90 Tab 3    aspirin delayed-release 81 mg tablet Take  by mouth daily.  multivitamin (ONE A DAY) tablet Take 1 Tab by mouth daily. Allergies   Allergen Reactions    Lisinopril Cough             SOCIAL HISTORY:     Social History     Tobacco Use    Smoking status: Never Smoker    Smokeless tobacco: Never Used   Vaping Use    Vaping Use: Never used   Substance Use Topics    Alcohol use: Yes     Alcohol/week: 20.0 standard drinks     Types: 20 Shots of liquor per week     Comment: whisky     Drug use: Never         FAMILY HISTORY:     Family History   Problem Relation Age of Onset    No Known Problems Mother     Diabetes Father     Diabetes Sister             REVIEW OF SYMPTOMS:      Review of Symptoms:  Constitutional: Negative for fever, chills  HEENT: Negative for nosebleeds, tinnitus, and vision changes. Respiratory: Negative for cough, wheezing  Cardiovascular: Negative for orthopnea, claudication, syncope, and PND. Gastrointestinal: Negative for abdominal pain, diarrhea, melena. Genitourinary: Negative for dysuria  Musculoskeletal: Negative for myalgias. Skin: Negative for rash  Heme: No problems bleeding. Neurological: Negative for speech change and focal weakness.            PHYSICAL EXAM:      Physical Exam:  Visit Vitals  Ht 5' 2\" (1.575 m)   Wt 178 lb (80.7 kg)   BMI 32.56 kg/m²       Patient appears generally well, mood and affect are appropriate and pleasant. HEENT:  Hearing intact, non-icteric, normocephalic, atraumatic. Neck Exam: Supple, No JVD    Lung Exam: Clear to auscultation, even breath sounds. Cardiac Exam: Regular rate and rhythm with no murmur or rub  Abdomen: Soft, non-tender, normal bowel sounds. Extremities: Moves all ext well. Mild bilat lower extremity edema.   Psych: Appropriate affect  Neuro - Grossly intact        LABS / OTHER STUDIES:      Lab Results   Component Value Date/Time Sodium 138 09/14/2021 09:18 AM    Potassium 4.9 09/14/2021 09:18 AM    Chloride 106 09/14/2021 09:18 AM    CO2 26 09/14/2021 09:18 AM    Anion gap 6 09/14/2021 09:18 AM    Glucose 239 (H) 09/14/2021 09:18 AM    BUN 21 (H) 09/14/2021 09:18 AM    Creatinine 1.09 09/14/2021 09:18 AM    BUN/Creatinine ratio 19 09/14/2021 09:18 AM    GFR est AA >60 09/14/2021 09:18 AM    GFR est non-AA >60 09/14/2021 09:18 AM    Calcium 9.6 09/14/2021 09:18 AM    Bilirubin, total 0.7 09/14/2021 09:18 AM    Alk. phosphatase 64 09/14/2021 09:18 AM    Protein, total 6.7 09/14/2021 09:18 AM    Albumin 3.6 09/14/2021 09:18 AM    Globulin 3.1 09/14/2021 09:18 AM    A-G Ratio 1.2 09/14/2021 09:18 AM    ALT (SGPT) 42 09/14/2021 09:18 AM    AST (SGOT) 26 09/14/2021 09:18 AM       Lab Results   Component Value Date/Time    WBC 3.7 (L) 09/14/2021 09:18 AM    HGB 11.9 (L) 09/14/2021 09:18 AM    HCT 36.3 (L) 09/14/2021 09:18 AM    PLATELET 712 (L) 41/77/6287 09:18 AM    MCV 94.5 09/14/2021 09:18 AM       Lab Results   Component Value Date/Time    Cholesterol, total 149 09/14/2021 09:18 AM    HDL Cholesterol 54 09/14/2021 09:18 AM    LDL, calculated 54.8 09/14/2021 09:18 AM    VLDL, calculated 40.2 09/14/2021 09:18 AM    Triglyceride 201 (H) 09/14/2021 09:18 AM    CHOL/HDL Ratio 2.8 09/14/2021 09:18 AM       Lab Results   Component Value Date/Time    TSH 1.94 09/14/2021 09:18 AM                    CARDIAC DIAGNOSTICS:      Cardiac Evaluation Includes:     Exercise cardiolite 5/1/19 - walked 8:30, no ischemic EKG changes, normal MPI. Stress EF 48%     Cardiac Cath 5/31/19 - LAD occluded. LIMA to LAD patent. Occluded SVG to Diag. Occluded SVG to OM. Distal PLB disease (small vessel). LVEF 55%  ---> medical management recommended    Echo 4/27/21 - LVEF 65%        EKG 1/14/20 - NSR, normal   EKG 3/11/21 - NSR, normal            ASSESSMENT AND PLAN:      Assessment and Plan:     1) CAD / CABG  - He has history of CABG in 2013.   He saw Dr. Madhavi Enriquez [facility] in [address] in 2019 - says he had a nuclear stress test and then a cath - had some small vessel blockages and treated medically. - had chronic class 2 angina (pain walking up hills or exerting himself) but better   - On 11/2/21 - he denies CP but class 2 RAMIREZ. Continue BB, Imdur, statin, ASA, CCB    2) Mild LE edema    - denies orthopnea or CHF  - possible med side effect since problems started after adding amlodipine  ---> swelling does not bother hm and he wants to keep meds as is (rather than adding other meds)     2) HTN   - continue meds   - keep  log of BP at home  - Gooal < 130/80      3) Dyslipidemia  - Cont Crestor 20 mg daily   - prior lipids OK      4) Etoh - admits drinking too much -- discussed cutting back     5) DM    6) See me in 3 months. Patient expressed understanding of the plan - questions were answered. Moved from [address] [place] [address] to [place] in 1984. Retired from [employer] building 2019. Family in [place]. Single.       Gabby Mckeon MD, [address] [address], [facility]     [address]  [address], [address], [address] [place] [address]  Ph: [phone]                               Ph [phone]

## 2021-11-16 ENCOUNTER — SPOT (OUTPATIENT)
Dept: URBAN - METROPOLITAN AREA CLINIC 38 | Facility: CLINIC | Age: 56
Setting detail: DERMATOLOGY
End: 2021-11-16

## 2021-11-16 PROBLEM — L57.0 ACTINIC KERATOSIS: Status: RESOLVED | Noted: 2021-11-16

## 2021-11-16 PROBLEM — D23.5 OTHER BENIGN NEOPLASM OF SKIN OF TRUNK: Status: RESOLVED | Noted: 2021-11-16

## 2021-11-16 PROCEDURE — 99203 OFFICE O/P NEW LOW 30 MIN: CPT

## 2021-11-16 PROCEDURE — 17000 DESTRUCT PREMALG LESION: CPT

## 2021-11-16 PROCEDURE — 17003 DESTRUCT PREMALG LES 2-14: CPT

## 2021-12-16 DIAGNOSIS — I10 ESSENTIAL HYPERTENSION: ICD-10-CM

## 2021-12-16 DIAGNOSIS — I25.10 CORONARY ARTERY DISEASE INVOLVING NATIVE CORONARY ARTERY OF NATIVE HEART WITHOUT ANGINA PECTORIS: ICD-10-CM

## 2021-12-16 RX ORDER — ISOSORBIDE MONONITRATE 30 MG/1
TABLET, EXTENDED RELEASE ORAL
Qty: 90 TABLET | Refills: 1 | Status: SHIPPED | OUTPATIENT
Start: 2021-12-16 | End: 2022-03-22 | Stop reason: SDUPTHER

## 2022-02-03 ENCOUNTER — OFFICE VISIT (OUTPATIENT)
Dept: CARDIOLOGY CLINIC | Age: 57
End: 2022-02-03
Payer: COMMERCIAL

## 2022-02-03 VITALS
HEART RATE: 90 BPM | HEIGHT: 62 IN | BODY MASS INDEX: 31.65 KG/M2 | OXYGEN SATURATION: 99 % | DIASTOLIC BLOOD PRESSURE: 80 MMHG | SYSTOLIC BLOOD PRESSURE: 130 MMHG | WEIGHT: 172 LBS

## 2022-02-03 DIAGNOSIS — I10 PRIMARY HYPERTENSION: ICD-10-CM

## 2022-02-03 DIAGNOSIS — I25.118 CORONARY ARTERY DISEASE OF NATIVE ARTERY OF NATIVE HEART WITH STABLE ANGINA PECTORIS (HCC): Primary | ICD-10-CM

## 2022-02-03 DIAGNOSIS — E78.00 HYPERCHOLESTEROLEMIA: ICD-10-CM

## 2022-02-03 DIAGNOSIS — R60.9 EDEMA, UNSPECIFIED TYPE: ICD-10-CM

## 2022-02-03 PROCEDURE — 99214 OFFICE O/P EST MOD 30 MIN: CPT | Performed by: SPECIALIST

## 2022-02-03 RX ORDER — AMLODIPINE BESYLATE 5 MG/1
5 TABLET ORAL
Qty: 90 TABLET | Refills: 3 | Status: SHIPPED | OUTPATIENT
Start: 2022-02-03

## 2022-02-03 RX ORDER — SPIRONOLACTONE 25 MG/1
12.5 TABLET ORAL DAILY
Qty: 45 TABLET | Refills: 3 | Status: SHIPPED | OUTPATIENT
Start: 2022-02-03 | End: 2022-07-07

## 2022-02-03 NOTE — PROGRESS NOTES
Gaurav Gutierrez is a 60 y.o. male    Visit Vitals  /80 (BP 1 Location: Left upper arm, BP Patient Position: Sitting, BP Cuff Size: Adult)   Pulse 90   Ht 5' 2\" (1.575 m)   Wt 172 lb (78 kg)   SpO2 99%   BMI 31.46 kg/m²       Chief Complaint   Patient presents with    Coronary Artery Disease    Hypertension       Chest pain DISCOMFORT  SOB NO  Dizziness NO  Swelling CALVES, ANKLES, FEET  Recent hospital visit NO  Refills NO  COVID VACCINE STATUS YES  HAD COVID?  NO

## 2022-02-03 NOTE — PROGRESS NOTES
Eliza Potter MD. [facility]              Patient: Luzmaria Centeno  : 1965      Today's Date: 2/3/2022            HISTORY OF PRESENT ILLNESS:     History of Present Illness:  Here for follow-up. Doing OK. Has some swelling in ankles. Has slight chest discomfort now and again - notices it when he exerts himself more than usual.  Breathing is OK. No orthopnea. PAST MEDICAL HISTORY:     Past Medical History:   Diagnosis Date    CAD (coronary artery disease)     Diabetes ([address]     Hx of CABG     Aug 2013 at [place] in [place]     Hypercholesterolemia     Hypertension          Past Surgical History:   Procedure Laterality Date    HX CORONARY ARTERY BYPASS GRAFT  2013    HX HEART CATHETERIZATION  2019    HX HEENT Left     eye vitrectomy    HX VITRECTOMY Left     ND CABG, ARTERY-VEIN, THREE             MEDICATIONS:     Current Outpatient Medications   Medication Sig Dispense Refill    isosorbide mononitrate ER (IMDUR) 30 mg tablet TAKE 1 TABLET BY MOUTH EVERY DAY IN THE MORNING 90 Tablet 1    amLODIPine (NORVASC) 10 mg tablet Take 1 Tablet by mouth once over twenty-four (24) hours. 90 Tablet 3    losartan-hydroCHLOROthiazide (HYZAAR) 100-25 mg per tablet TAKE 1 TABLET BY MOUTH EVERY DAY IN THE MORNING INDICATIONS: HIGH BLOOD PRESSURE 90 Tablet 1    glipiZIDE SR (GLUCOTROL XL) 5 mg CR tablet TAKE 2 TABLETS BY MOUTH TWICE A DAY WITH BREAKFAST AND DINNER FOR TYPE 2 DIABETES MELLITUS 360 Tablet 1    metFORMIN (GLUCOPHAGE) 500 mg tablet TAKE 2 TABLETS BY MOUTH TWICE A DAY WITH MORNING AND EVENING MEALS (Patient taking differently: Take 500 mg by mouth two (2) times daily (with meals). TAKE 2 TABLETS BY MOUTH TWICE A DAY WITH MORNING AND EVENING MEALS (pt reports only taking one 500 mg tablet BID d/t s/e of diarrhea)) 360 Tablet 1    metoprolol succinate (TOPROL-XL) 200 mg XL tablet Take 1 Tab by mouth daily.  90 Tab 3    rosuvastatin (CRESTOR) 20 mg tablet Take 1 Tab by mouth nightly. 90 Tab 3    aspirin delayed-release 81 mg tablet Take  by mouth daily.  multivitamin (ONE A DAY) tablet Take 1 Tab by mouth daily.  ofloxacin (FLOXIN) 0.3 % otic solution 4 DROPS TWICE A DAY AS NEEDED AFFECTED EAR(S) (Patient not taking: Reported on 11/2/2021)         Allergies   Allergen Reactions    Lisinopril Cough             SOCIAL HISTORY:     Social History     Tobacco Use    Smoking status: Never Smoker    Smokeless tobacco: Never Used   Vaping Use    Vaping Use: Never used   Substance Use Topics    Alcohol use: Yes     Alcohol/week: 20.0 standard drinks     Types: 20 Shots of liquor per week     Comment: whisky     Drug use: Never         FAMILY HISTORY:     Family History   Problem Relation Age of Onset    No Known Problems Mother     Diabetes Father     Diabetes Sister             REVIEW OF SYMPTOMS:      Review of Symptoms:  Constitutional: Negative for fever, chills  HEENT: Negative for nosebleeds, tinnitus, and vision changes. Respiratory: Negative for cough, wheezing  Cardiovascular: Negative for orthopnea, claudication, syncope, and PND. Gastrointestinal: Negative for abdominal pain, diarrhea, melena. Genitourinary: Negative for dysuria  Musculoskeletal: Negative for myalgias. Skin: Negative for rash  Heme: No problems bleeding. Neurological: Negative for speech change and focal weakness.            PHYSICAL EXAM:      Physical Exam:  Visit Vitals  /80 (BP 1 Location: Left upper arm, BP Patient Position: Sitting, BP Cuff Size: Adult)   Pulse 90   Ht 5' 2\" (1.575 m)   Wt 172 lb (78 kg)   SpO2 99%   BMI 31.46 kg/m²       Patient appears generally well, mood and affect are appropriate and pleasant. HEENT:  Hearing intact, non-icteric, normocephalic, atraumatic. Neck Exam: Supple, No JVD    Lung Exam: Clear to auscultation, even breath sounds.    Cardiac Exam: Regular rate and rhythm with no murmur or rub  Abdomen: Soft, non-tender, normal bowel sounds. Extremities: Moves all ext well. Mild bilat lower extremity edema. Psych: Appropriate affect  Neuro - Grossly intact        LABS / OTHER STUDIES:      Lab Results   Component Value Date/Time    Sodium 138 09/14/2021 09:18 AM    Potassium 4.9 09/14/2021 09:18 AM    Chloride 106 09/14/2021 09:18 AM    CO2 26 09/14/2021 09:18 AM    Anion gap 6 09/14/2021 09:18 AM    Glucose 239 (H) 09/14/2021 09:18 AM    BUN 21 (H) 09/14/2021 09:18 AM    Creatinine 1.09 09/14/2021 09:18 AM    BUN/Creatinine ratio 19 09/14/2021 09:18 AM    GFR est AA >60 09/14/2021 09:18 AM    GFR est non-AA >60 09/14/2021 09:18 AM    Calcium 9.6 09/14/2021 09:18 AM    Bilirubin, total 0.7 09/14/2021 09:18 AM    Alk. phosphatase 64 09/14/2021 09:18 AM    Protein, total 6.7 09/14/2021 09:18 AM    Albumin 3.6 09/14/2021 09:18 AM    Globulin 3.1 09/14/2021 09:18 AM    A-G Ratio 1.2 09/14/2021 09:18 AM    ALT (SGPT) 42 09/14/2021 09:18 AM    AST (SGOT) 26 09/14/2021 09:18 AM       Lab Results   Component Value Date/Time    WBC 3.7 (L) 09/14/2021 09:18 AM    HGB 11.9 (L) 09/14/2021 09:18 AM    HCT 36.3 (L) 09/14/2021 09:18 AM    PLATELET 567 (L) 70/82/7008 09:18 AM    MCV 94.5 09/14/2021 09:18 AM       Lab Results   Component Value Date/Time    Cholesterol, total 149 09/14/2021 09:18 AM    HDL Cholesterol 54 09/14/2021 09:18 AM    LDL, calculated 54.8 09/14/2021 09:18 AM    VLDL, calculated 40.2 09/14/2021 09:18 AM    Triglyceride 201 (H) 09/14/2021 09:18 AM    CHOL/HDL Ratio 2.8 09/14/2021 09:18 AM       Lab Results   Component Value Date/Time    TSH 1.94 09/14/2021 09:18 AM                    CARDIAC DIAGNOSTICS:      Cardiac Evaluation Includes:     Exercise cardiolite 5/1/19 - walked 8:30, no ischemic EKG changes, normal MPI. Stress EF 48%     Cardiac Cath 5/31/19 - LAD occluded. LIMA to LAD patent. Occluded SVG to Diag. Occluded SVG to OM. Distal PLB disease (small vessel).  LVEF 55%  ---> medical management recommended    Echo 4/27/21 - LVEF 65%        EKG 1/14/20 - NSR, normal   EKG 3/11/21 - NSR, normal            ASSESSMENT AND PLAN:      Assessment and Plan:     1) CAD / CABG and chronic stable angina   - He has history of CABG in 2013. He saw Dr. Charito Elkins in [facility] in 2019 - says he had a nuclear stress test and then a cath - had some small vessel blockages and treated medically. - had chronic class 2 angina (pain walking up hills or exerting himself) and class 3 RAMIREZ  - Continue BB, Imdur, statin, ASA, CCB    2) Mild LE edema    - denies orthopnea or CHF  - Edema possibly due to amlodipine --> cut amlodipine to 5 mg and add aldactone 12.5 mg daily - follow labs   - he also admits to excessive etoh and soda use which he knows to cut back on   - check an echo        2) HTN   - continue meds   - keep  log of BP at home  - Gooal < 130/80      3) Dyslipidemia  - Cont Crestor 20 mg daily   - prior lipids OK      4) Etoh   - admits drinking too much -- discussed cutting back   - says he drinks 1 pint a whisky a day since pandemic (also drinks a lot of soda)     5) DM    6) See NP in one month with an echo then as well. Patient expressed understanding of the plan - questions were answered. Moved from [address] [place] [address] to [place] in 1984. Retired from [employer] building 2019. Family in [facility]. Single.       Daryl Ardon MD, [address]     [address]  [address], [address] [place] [address], [address] [place] [address]  Ph: [phone]                               Ph [phone]          ADDENDUM   3/10/2022    Echo 3/10/22 - mild LVH, LVEF 55-60%, grade 1 diastology     Will send a message

## 2022-02-04 DIAGNOSIS — E78.00 HYPERCHOLESTEROLEMIA: ICD-10-CM

## 2022-02-04 RX ORDER — ROSUVASTATIN CALCIUM 20 MG/1
TABLET, COATED ORAL
Qty: 90 TABLET | Refills: 1 | Status: SHIPPED | OUTPATIENT
Start: 2022-02-04 | End: 2022-07-07

## 2022-02-09 DIAGNOSIS — E11.69 TYPE 2 DIABETES MELLITUS WITH OTHER SPECIFIED COMPLICATION, WITHOUT LONG-TERM CURRENT USE OF INSULIN (HCC): ICD-10-CM

## 2022-02-09 RX ORDER — GLIPIZIDE 5 MG/1
TABLET, FILM COATED, EXTENDED RELEASE ORAL
Qty: 360 TABLET | Refills: 1 | Status: SHIPPED | OUTPATIENT
Start: 2022-02-09 | End: 2022-08-04

## 2022-02-18 ENCOUNTER — TRANSCRIBE ORDER (OUTPATIENT)
Dept: SCHEDULING | Age: 57
End: 2022-02-18

## 2022-02-18 DIAGNOSIS — E66.9 OBESITY, UNSPECIFIED: ICD-10-CM

## 2022-02-18 DIAGNOSIS — N18.30 CHRONIC RENAL DISEASE, STAGE III (HCC): ICD-10-CM

## 2022-02-18 DIAGNOSIS — E78.5 HYPERLIPEMIA: ICD-10-CM

## 2022-02-18 DIAGNOSIS — I10 ESSENTIAL HYPERTENSION, MALIGNANT: ICD-10-CM

## 2022-02-18 DIAGNOSIS — E11.9 DIABETES MELLITUS (HCC): Primary | ICD-10-CM

## 2022-02-18 DIAGNOSIS — R80.9 PROTEINURIA: ICD-10-CM

## 2022-02-21 ENCOUNTER — TELEPHONE (OUTPATIENT)
Dept: FAMILY MEDICINE CLINIC | Age: 57
End: 2022-02-21

## 2022-02-21 NOTE — TELEPHONE ENCOUNTER
----- Message from Jason Mitul sent at 2/21/2022 10:17 AM EST -----  Subject: Message to Provider    QUESTIONS  Information for Provider? Boris Wick NP from [facility] called   and stated Dr Jeremiah Canales would like Pepe Candelaria to start taking Elda Vasquez or   Kristine Benjamin  ---------------------------------------------------------------------------  --------------  [employer] INFO  What is the best way for the office to contact you? OK to leave message on   voicemail  Preferred Call Back Phone Number? [phone]  ---------------------------------------------------------------------------  --------------  SCRIPT ANSWERS  Relationship to Patient? Third Party  Representative Name?  Boris Wick from [facility]

## 2022-02-21 NOTE — TELEPHONE ENCOUNTER
I have not seen pt since Sept. Please call to set up appt to see me. He needs A1C and fasting cholesterol  Rechecked. We can discuss adding new med that Nephrology wants him on.

## 2022-03-02 LAB
BUN SERPL-MCNC: 17 MG/DL (ref 6–24)
BUN/CREAT SERPL: 18 (ref 9–20)
CALCIUM SERPL-MCNC: 9.8 MG/DL (ref 8.7–10.2)
CHLORIDE SERPL-SCNC: 101 MMOL/L (ref 96–106)
CO2 SERPL-SCNC: 19 MMOL/L (ref 20–29)
CREAT SERPL-MCNC: 0.97 MG/DL (ref 0.76–1.27)
EGFR: 90 ML/MIN/1.73
GLUCOSE SERPL-MCNC: 249 MG/DL (ref 65–99)
INTERPRETATION: NORMAL
POTASSIUM SERPL-SCNC: 4.8 MMOL/L (ref 3.5–5.2)
SODIUM SERPL-SCNC: 139 MMOL/L (ref 134–144)

## 2022-03-03 NOTE — PROGRESS NOTES
Dear Mr. Arpan Moon,  Your test results are stable. Please let me know if you have any questions.    Dr. Marlin Thornton

## 2022-03-07 DIAGNOSIS — I10 ESSENTIAL HYPERTENSION: Primary | ICD-10-CM

## 2022-03-07 RX ORDER — HYDROCHLOROTHIAZIDE 25 MG/1
25 TABLET ORAL DAILY
Qty: 90 TABLET | Refills: 0 | Status: SHIPPED | OUTPATIENT
Start: 2022-03-07 | End: 2022-06-10

## 2022-03-07 RX ORDER — LOSARTAN POTASSIUM 100 MG/1
100 TABLET ORAL DAILY
Qty: 90 TABLET | Refills: 0 | Status: SHIPPED | OUTPATIENT
Start: 2022-03-07 | End: 2022-06-10

## 2022-03-10 ENCOUNTER — OFFICE VISIT (OUTPATIENT)
Dept: CARDIOLOGY CLINIC | Age: 57
End: 2022-03-10

## 2022-03-10 ENCOUNTER — ANCILLARY PROCEDURE (OUTPATIENT)
Dept: CARDIOLOGY CLINIC | Age: 57
End: 2022-03-10
Payer: COMMERCIAL

## 2022-03-10 VITALS
HEIGHT: 62 IN | WEIGHT: 173 LBS | BODY MASS INDEX: 31.83 KG/M2 | SYSTOLIC BLOOD PRESSURE: 136 MMHG | DIASTOLIC BLOOD PRESSURE: 82 MMHG

## 2022-03-10 VITALS
DIASTOLIC BLOOD PRESSURE: 82 MMHG | SYSTOLIC BLOOD PRESSURE: 136 MMHG | WEIGHT: 173 LBS | BODY MASS INDEX: 31.83 KG/M2 | HEART RATE: 90 BPM | OXYGEN SATURATION: 98 % | HEIGHT: 62 IN

## 2022-03-10 DIAGNOSIS — Z95.1 HX OF CABG: ICD-10-CM

## 2022-03-10 DIAGNOSIS — R60.9 EDEMA, UNSPECIFIED TYPE: ICD-10-CM

## 2022-03-10 DIAGNOSIS — I25.118 CORONARY ARTERY DISEASE OF NATIVE ARTERY OF NATIVE HEART WITH STABLE ANGINA PECTORIS (HCC): ICD-10-CM

## 2022-03-10 DIAGNOSIS — I10 ESSENTIAL HYPERTENSION: Primary | ICD-10-CM

## 2022-03-10 DIAGNOSIS — I10 PRIMARY HYPERTENSION: ICD-10-CM

## 2022-03-10 DIAGNOSIS — F10.10 ETOH ABUSE: ICD-10-CM

## 2022-03-10 DIAGNOSIS — E78.5 DYSLIPIDEMIA: ICD-10-CM

## 2022-03-10 LAB
ECHO AO ROOT DIAM: 3.5 CM
ECHO AO ROOT INDEX: 1.94 CM/M2
ECHO AV AREA PEAK VELOCITY: 2.2 CM2
ECHO AV AREA PEAK VELOCITY: 2.2 CM2
ECHO AV AREA VTI: 2.8 CM2
ECHO AV AREA VTI: 2.8 CM2
ECHO AV MEAN GRADIENT: 4 MMHG
ECHO AV MEAN VELOCITY: 0.9 M/S
ECHO AV PEAK GRADIENT: 7 MMHG
ECHO AV PEAK VELOCITY: 1.3 M/S
ECHO AV VELOCITY RATIO: 0.62
ECHO AV VTI: 21.9 CM
ECHO LA DIAMETER INDEX: 2.28 CM/M2
ECHO LA DIAMETER: 4.1 CM
ECHO LA TO AORTIC ROOT RATIO: 1.17
ECHO LA VOL 2C: 40 ML (ref 18–58)
ECHO LA VOL 4C: 40 ML (ref 18–58)
ECHO LA VOL BP: 41 ML (ref 18–58)
ECHO LA VOL BP: 41 ML (ref 18–58)
ECHO LA VOLUME AREA LENGTH: 43 ML
ECHO LA VOLUME INDEX A2C: 22 ML/M2 (ref 16–34)
ECHO LA VOLUME INDEX A4C: 22 ML/M2 (ref 16–34)
ECHO LA VOLUME INDEX AREA LENGTH: 24 ML/M2 (ref 16–34)
ECHO LV E' LATERAL VELOCITY: 6 CM/S
ECHO LV E' SEPTAL VELOCITY: 5 CM/S
ECHO LV EDV A2C: 70 ML
ECHO LV EDV A4C: 67 ML
ECHO LV EDV BP: 69 ML (ref 67–155)
ECHO LV EDV INDEX A4C: 37 ML/M2
ECHO LV EDV INDEX BP: 38 ML/M2
ECHO LV EDV NDEX A2C: 39 ML/M2
ECHO LV EJECTION FRACTION A2C: 57 %
ECHO LV EJECTION FRACTION A4C: 59 %
ECHO LV EJECTION FRACTION BIPLANE: 57 % (ref 55–100)
ECHO LV ESV A2C: 30 ML
ECHO LV ESV A4C: 28 ML
ECHO LV ESV BP: 29 ML (ref 22–58)
ECHO LV ESV INDEX A2C: 17 ML/M2
ECHO LV ESV INDEX A4C: 16 ML/M2
ECHO LV ESV INDEX BP: 16 ML/M2
ECHO LV FRACTIONAL SHORTENING: 33 % (ref 28–44)
ECHO LV INTERNAL DIMENSION DIASTOLE INDEX: 2.39 CM/M2
ECHO LV INTERNAL DIMENSION DIASTOLIC: 4.3 CM (ref 4.2–5.9)
ECHO LV INTERNAL DIMENSION SYSTOLIC INDEX: 1.61 CM/M2
ECHO LV INTERNAL DIMENSION SYSTOLIC: 2.9 CM
ECHO LV IVSD: 1.2 CM (ref 0.6–1)
ECHO LV MASS 2D: 173.6 G (ref 88–224)
ECHO LV MASS INDEX 2D: 96.5 G/M2 (ref 49–115)
ECHO LV POSTERIOR WALL DIASTOLIC: 1.1 CM (ref 0.6–1)
ECHO LV RELATIVE WALL THICKNESS RATIO: 0.51
ECHO LVOT AREA: 3.5 CM2
ECHO LVOT AV VTI INDEX: 0.78
ECHO LVOT DIAM: 2.1 CM
ECHO LVOT MEAN GRADIENT: 2 MMHG
ECHO LVOT PEAK GRADIENT: 2 MMHG
ECHO LVOT PEAK VELOCITY: 0.8 M/S
ECHO LVOT STROKE VOLUME INDEX: 32.7 ML/M2
ECHO LVOT SV: 58.9 ML
ECHO LVOT VTI: 17 CM
ECHO MV A VELOCITY: 0.96 M/S
ECHO MV AREA PHT: 3.4 CM2
ECHO MV E DECELERATION TIME (DT): 222.2 MS
ECHO MV E VELOCITY: 0.67 M/S
ECHO MV E/A RATIO: 0.7
ECHO MV E/E' LATERAL: 11.17
ECHO MV E/E' RATIO (AVERAGED): 12.28
ECHO MV E/E' SEPTAL: 13.4
ECHO MV PRESSURE HALF TIME (PHT): 64.4 MS
ECHO RV FREE WALL PEAK S': 10 CM/S
ECHO RV INTERNAL DIMENSION: 3.5 CM
ECHO RV TAPSE: 1.6 CM (ref 1.5–2)

## 2022-03-10 PROCEDURE — 93000 ELECTROCARDIOGRAM COMPLETE: CPT | Performed by: NURSE PRACTITIONER

## 2022-03-10 PROCEDURE — 99214 OFFICE O/P EST MOD 30 MIN: CPT | Performed by: NURSE PRACTITIONER

## 2022-03-10 PROCEDURE — 93306 TTE W/DOPPLER COMPLETE: CPT | Performed by: SPECIALIST

## 2022-03-10 NOTE — PATIENT INSTRUCTIONS
You can change your Toprol XL to 100mg twice daily (1/2 tab twice daily)    Come back in 2mo to re-eval; goal BP <130/80 most of the time.

## 2022-03-10 NOTE — PROGRESS NOTES
Chief Complaint   Patient presents with    Cholesterol Problem    Hypertension    Coronary Artery Disease    Other     CABG    Cardiac Testing     TODAY WITH ALFONZO     Visit Vitals  /82 (BP 1 Location: Left upper arm, BP Patient Position: Sitting)   Pulse 90   Ht 5' 2\" (1.575 m)   Wt 173 lb (78.5 kg)   SpO2 98%   BMI 31.64 kg/m²     Chest pain 2/17/22 AFTER RIDING IN THE CAR FOR 7 HOURS, THAT NIGHT HE HAD UPPER RIGHT CHEST AIN AND ARM PAIN, TOOK A NITRO PILL x 2, THEN TOOK A METOPROLOL (EXTRA) THAT TOOK THE PAIN WAY PULSE=104    Palpations denied    SOB denied    Dizziness denied    Swelling in hands/feet IS GETTING BETTER, BUT STILL THERE    Recent hospital stays denied

## 2022-03-10 NOTE — PROGRESS NOTES
Lynette Blackburn, [facility]  [id number]  [place], [address] [facility]    Office [phone]  Fax [phone]              Patient: Clifford Jackson  : 1965      Today's Date: 3/10/2022            HISTORY OF PRESENT ILLNESS:     History of Present Illness:  Here for follow-up. Symptomatically feels he is doing OK. Has some mild swelling which is better since decreasing norvasc last visit. Notes RAMIREZ at times if overexerting. Patient denies any exertional chest pain, palpitations, syncope, or paroxysmal nocturnal dyspnea. Hx of CP infreq - last episode in Feb.     \"22 AFTER RIDING IN THE CAR FOR 7 HOURS, THAT NIGHT HE HAD UPPER RIGHT CHEST AIN AND ARM PAIN, TOOK A NITRO PILL x 2, THEN TOOK A METOPROLOL (EXTRA) THAT TOOK THE PAIN WAY PULSE=104\"    Hm BP 120s-150s/70s-90s. HR 80s-90s. PAST MEDICAL HISTORY:     Past Medical History:   Diagnosis Date    CAD (coronary artery disease)     Diabetes ([facility] [address]     Hx of CABG     Aug 2013 at [place] [facility] [place]     Hypercholesterolemia     Hypertension          Past Surgical History:   Procedure Laterality Date    HX CORONARY ARTERY BYPASS GRAFT  2013    HX HEART CATHETERIZATION  2019    HX HEENT Left     eye vitrectomy    HX VITRECTOMY Left     [place] CABG, ARTERY-VEIN, THREE             MEDICATIONS:     Current Outpatient Medications on File Prior to Visit   Medication Sig Dispense Refill    losartan (COZAAR) 100 mg tablet Take 1 Tablet by mouth daily. Indications: high blood pressure 90 Tablet 0    hydroCHLOROthiazide (HYDRODIURIL) 25 mg tablet Take 1 Tablet by mouth daily.  Indications: high blood pressure 90 Tablet 0    glipiZIDE SR (GLUCOTROL XL) 5 mg CR tablet TAKE 2 TABLETS BY MOUTH TWICE A DAY WITH BREAKFAST AND DINNER FOR TYPE 2 DIABETES MELLITUS 360 Tablet 1    rosuvastatin (CRESTOR) 20 mg tablet TAKE 1 TABLET BY MOUTH EVERY DAY AT NIGHT 90 Tablet 1    amLODIPine (NORVASC) 5 mg tablet Take 1 Tablet by mouth once over twenty-four (24) hours. 90 Tablet 3    spironolactone (ALDACTONE) 25 mg tablet Take 0.5 Tablets by mouth daily. 45 Tablet 3    ofloxacin (FLOXIN) 0.3 % otic solution 4 DROPS TWICE A DAY AS NEEDED AFFECTED EAR(S) (Patient not taking: Reported on 3/11/2022)      metFORMIN (GLUCOPHAGE) 500 mg tablet TAKE 2 TABLETS BY MOUTH TWICE A DAY WITH MORNING AND EVENING MEALS (Patient taking differently: Take 500 mg by mouth two (2) times daily (with meals). TAKE 2 TABLETS BY MOUTH TWICE A DAY WITH MORNING AND EVENING MEALS (pt reports only taking one 500 mg tablet BID d/t s/e of diarrhea)) 360 Tablet 1    multivitamin (ONE A DAY) tablet Take 1 Tab by mouth daily.  [DISCONTINUED] aspirin delayed-release 81 mg tablet Take  by mouth daily. (Patient not taking: Reported on 3/11/2022)       No current facility-administered medications on file prior to visit. Allergies   Allergen Reactions    Lisinopril Cough             SOCIAL HISTORY:     Social History     Tobacco Use    Smoking status: Never Smoker    Smokeless tobacco: Never Used   Vaping Use    Vaping Use: Never used   Substance Use Topics    Alcohol use: Yes     Alcohol/week: 20.0 standard drinks     Types: 20 Shots of liquor per week     Comment: whisky     Drug use: Never         FAMILY HISTORY:     Family History   Problem Relation Age of Onset    No Known Problems Mother     Diabetes Father     Diabetes Sister             REVIEW OF SYMPTOMS:      Review of Symptoms:  Constitutional: Negative for fever, chills  HEENT: Negative for nosebleeds, tinnitus, and vision changes. Respiratory: Negative for cough, wheezing  Cardiovascular: Negative for orthopnea, claudication, syncope, and PND. Gastrointestinal: Negative for abdominal pain, diarrhea, melena. Genitourinary: Negative for dysuria  Musculoskeletal: Negative for myalgias. Skin: Negative for rash  Heme: No problems bleeding.   Neurological: Negative for speech change and focal weakness.            PHYSICAL EXAM:      Physical Exam:  Visit Vitals  /82 (BP 1 Location: Left upper arm, BP Patient Position: Sitting)   Pulse 90   Ht 5' 2\" (1.575 m)   Wt 173 lb (78.5 kg)   SpO2 98%   BMI 31.64 kg/m²     Patient appears generally well, mood and affect are appropriate and pleasant. HEENT:  Hearing intact, non-icteric, normocephalic, atraumatic. Neck Exam: Supple, No JVD    Lung Exam: Clear to auscultation, even breath sounds. Cardiac Exam: Regular rate and rhythm with no murmur or rub  Abdomen: Soft, non-tender, normal bowel sounds. Extremities: Moves all ext well. Trace bilat lower extremity edema. Psych: Appropriate affect  Neuro - Grossly intact        LABS / OTHER STUDIES:      Lab Results   Component Value Date/Time    Sodium 139 03/01/2022 10:30 AM    Potassium 4.8 03/01/2022 10:30 AM    Chloride 101 03/01/2022 10:30 AM    CO2 19 (L) 03/01/2022 10:30 AM    Anion gap 6 09/14/2021 09:18 AM    Glucose 249 (H) 03/01/2022 10:30 AM    BUN 17 03/01/2022 10:30 AM    Creatinine 0.97 03/01/2022 10:30 AM    BUN/Creatinine ratio 18 03/01/2022 10:30 AM    GFR est AA >60 09/14/2021 09:18 AM    GFR est non-AA >60 09/14/2021 09:18 AM    Calcium 9.8 03/01/2022 10:30 AM    Bilirubin, total 0.7 09/14/2021 09:18 AM    Alk.  phosphatase 64 09/14/2021 09:18 AM    Protein, total 6.7 09/14/2021 09:18 AM    Albumin 3.6 09/14/2021 09:18 AM    Globulin 3.1 09/14/2021 09:18 AM    A-G Ratio 1.2 09/14/2021 09:18 AM    ALT (SGPT) 42 09/14/2021 09:18 AM    AST (SGOT) 26 09/14/2021 09:18 AM       Lab Results   Component Value Date/Time    WBC 3.7 (L) 09/14/2021 09:18 AM    HGB 11.9 (L) 09/14/2021 09:18 AM    HCT 36.3 (L) 09/14/2021 09:18 AM    PLATELET 013 (L) 87/52/7928 09:18 AM    MCV 94.5 09/14/2021 09:18 AM       Lab Results   Component Value Date/Time    Cholesterol, total 149 09/14/2021 09:18 AM    HDL Cholesterol 54 09/14/2021 09:18 AM    LDL, calculated 54.8 09/14/2021 09:18 AM    VLDL, calculated 40.2 09/14/2021 09:18 AM    Triglyceride 201 (H) 09/14/2021 09:18 AM    CHOL/HDL Ratio 2.8 09/14/2021 09:18 AM       Lab Results   Component Value Date/Time    TSH 1.94 09/14/2021 09:18 AM      CARDIAC DIAGNOSTICS:      Cardiac Evaluation Includes:     Exercise cardiolite 5/1/19 - walked 8:30, no ischemic EKG changes, normal MPI. Stress EF 48%     Cardiac Cath 5/31/19 - LAD occluded. LIMA to LAD patent. Occluded SVG to Diag. Occluded SVG to OM. Distal PLB disease (small vessel). LVEF 55%  ---> medical management recommended    Echo 4/27/21 - LVEF 65%    EKG 1/14/20 - NSR, normal   EKG 3/11/21 - NSR, normal         ASSESSMENT AND PLAN:      Assessment and Plan:  1) CAD / CABG and chronic stable angina   - He has history of CABG in 2013. He saw Dr. Saskia Hays in [place] in 2019 - says he had a nuclear stress test and then a cath - had some small vessel blockages and treated medically. - had chronic class 2 angina (pain walking up hills or exerting himself) and class 3 RAMIREZ  - Continue BB, Imdur, statin, ASA, CCB - advised to take aspirin 81mg/d    2) Mild LE edema    - denies orthopnea or CHF  - 2/3/22 - Edema possibly due to amlodipine --> cut amlodipine to 5 mg and add aldactone 12.5 mg daily - follow labs   - 3/10/22 - edema improved - labs stable on 3/1/22 - cont meds  - he also admits to excessive etoh and soda use - drinks >1/2 pint  of liquor per day; discussed at length and advised to cut back  - echo today (results pending)      2) HTN   - continue meds   - Hm BP 120s-150s/70s-90s. HR 80s-90s.  - high in the AM prior to meds - will change Toprol XL to 100mg BID, cont to monitor   - Goal < 130/80 - pt advised to call if still running high      3) Dyslipidemia  - Cont Crestor 20 mg daily   - prior lipids OK      4) Etoh   - admits drinking too much -- discussed cutting back     5) DM    6) phone f/u with echo results; f/u in office in 2mo or PRN      Patient expressed understanding of the plan - questions were answered. Moved from [place] to [place] in 1984. Retired from ship building 2019. Family in [facility].   Single.       Chayo Claros, ANP

## 2022-03-11 ENCOUNTER — OFFICE VISIT (OUTPATIENT)
Dept: FAMILY MEDICINE CLINIC | Age: 57
End: 2022-03-11
Payer: COMMERCIAL

## 2022-03-11 VITALS
RESPIRATION RATE: 16 BRPM | SYSTOLIC BLOOD PRESSURE: 142 MMHG | HEART RATE: 83 BPM | WEIGHT: 171.6 LBS | TEMPERATURE: 98.6 F | OXYGEN SATURATION: 97 % | HEIGHT: 62 IN | DIASTOLIC BLOOD PRESSURE: 92 MMHG | BODY MASS INDEX: 31.58 KG/M2

## 2022-03-11 DIAGNOSIS — G47.00 INSOMNIA, UNSPECIFIED TYPE: ICD-10-CM

## 2022-03-11 DIAGNOSIS — F10.10 ALCOHOL ABUSE, DAILY USE: ICD-10-CM

## 2022-03-11 DIAGNOSIS — H93.8X1 SENSATION OF PLUGGED EAR ON RIGHT SIDE: ICD-10-CM

## 2022-03-11 DIAGNOSIS — Z79.899 ENCOUNTER FOR LONG-TERM CURRENT USE OF MEDICATION: ICD-10-CM

## 2022-03-11 DIAGNOSIS — R35.1 NOCTURIA: ICD-10-CM

## 2022-03-11 DIAGNOSIS — E11.21 TYPE 2 DIABETES WITH NEPHROPATHY (HCC): Primary | ICD-10-CM

## 2022-03-11 DIAGNOSIS — E78.00 HYPERCHOLESTEROLEMIA: ICD-10-CM

## 2022-03-11 PROBLEM — F51.01 PRIMARY INSOMNIA: Status: ACTIVE | Noted: 2022-03-11

## 2022-03-11 PROCEDURE — 99214 OFFICE O/P EST MOD 30 MIN: CPT | Performed by: FAMILY MEDICINE

## 2022-03-11 NOTE — PROGRESS NOTES
Chief Complaint   Patient presents with    Diabetes     3 most recent PHQ Screens 3/11/2022   Little interest or pleasure in doing things Not at all   Feeling down, depressed, irritable, or hopeless Not at all   Total Score PHQ 2 0     Abuse Screening Questionnaire 3/11/2022   Do you ever feel afraid of your partner? N   Are you in a relationship with someone who physically or mentally threatens you? N   Is it safe for you to go home? Y     Visit Vitals  BP (!) 142/92 (BP 1 Location: Left upper arm, BP Patient Position: Sitting, BP Cuff Size: Small adult)   Pulse 83   Temp 98.6 °F (37 °C) (Oral)   Resp 16   Ht 5' 2\" (1.575 m)   Wt 171 lb 9.6 oz (77.8 kg)   SpO2 97%   BMI 31.39 kg/m²     1. \"Have you been to the ER, urgent care clinic since your last visit? Hospitalized since your last visit? \" no    2. \"Have you seen or consulted any other health care providers outside of the [address] [place] [address] since your last visit? \" no    3. For patients aged 39-70: Has the patient had a colonoscopy / FIT/ Cologuard? no

## 2022-03-11 NOTE — PROGRESS NOTES
Subjective:     Dayan Perry is a 60 y.o. male who presents for follow up of diabetes, hypertension and hyperlipidemia. Diet and Lifestyle: generally follows a low fat low cholesterol diet, generally follows a low sodium diet, nonsmoker, alcohol intake excess. Home BP Monitoring: is not measured at home    Cardiovascular ROS: taking medications as instructed, no medication side effects noted, no TIA's, no chest pain on exertion, no dyspnea on exertion, no swelling of ankles. New concerns: BP readings run higher in AM but lower later in day. Scheduled for renal biopsy next week @ [facility]. Hx proteinuria. He continues to drink too much alcohol. He claims it helps him sleep better. Patient Active Problem List    Diagnosis Date Noted    Primary insomnia 03/11/2022    Alcohol abuse, daily use 03/11/2022    Nocturia 03/11/2022    Type 2 diabetes with nephropathy ([facility] [address] 07/29/2020    Encounter for long-term current use of medication 07/29/2020    CAD (coronary artery disease)     Hx of CABG     Hypertension 12/11/2019    Hypercholesterolemia 12/11/2019    Coronary artery disease involving native coronary artery of native heart 12/11/2019     Current Outpatient Medications   Medication Sig Dispense Refill    losartan (COZAAR) 100 mg tablet Take 1 Tablet by mouth daily. Indications: high blood pressure 90 Tablet 0    hydroCHLOROthiazide (HYDRODIURIL) 25 mg tablet Take 1 Tablet by mouth daily. Indications: high blood pressure 90 Tablet 0    glipiZIDE SR (GLUCOTROL XL) 5 mg CR tablet TAKE 2 TABLETS BY MOUTH TWICE A DAY WITH BREAKFAST AND DINNER FOR TYPE 2 DIABETES MELLITUS 360 Tablet 1    rosuvastatin (CRESTOR) 20 mg tablet TAKE 1 TABLET BY MOUTH EVERY DAY AT NIGHT 90 Tablet 1    amLODIPine (NORVASC) 5 mg tablet Take 1 Tablet by mouth once over twenty-four (24) hours. 90 Tablet 3    spironolactone (ALDACTONE) 25 mg tablet Take 0.5 Tablets by mouth daily.  45 Tablet 3    isosorbide mononitrate ER (IMDUR) 30 mg tablet TAKE 1 TABLET BY MOUTH EVERY DAY IN THE MORNING 90 Tablet 1    metFORMIN (GLUCOPHAGE) 500 mg tablet TAKE 2 TABLETS BY MOUTH TWICE A DAY WITH MORNING AND EVENING MEALS (Patient taking differently: Take 500 mg by mouth two (2) times daily (with meals). TAKE 2 TABLETS BY MOUTH TWICE A DAY WITH MORNING AND EVENING MEALS (pt reports only taking one 500 mg tablet BID d/t s/e of diarrhea)) 360 Tablet 1    metoprolol succinate (TOPROL-XL) 200 mg XL tablet Take 1 Tab by mouth daily. 90 Tab 3    multivitamin (ONE A DAY) tablet Take 1 Tab by mouth daily.  ofloxacin (FLOXIN) 0.3 % otic solution 4 DROPS TWICE A DAY AS NEEDED AFFECTED EAR(S) (Patient not taking: Reported on 3/11/2022)      aspirin delayed-release 81 mg tablet Take  by mouth daily. (Patient not taking: Reported on 3/11/2022)       Allergies   Allergen Reactions    Lisinopril Cough     Social History     Tobacco Use    Smoking status: Never Smoker    Smokeless tobacco: Never Used   Substance Use Topics    Alcohol use: Yes     Alcohol/week: 20.0 standard drinks     Types: 20 Shots of liquor per week     Comment: whisky              Review of Systems, additional:  Pertinent items are noted in HPI. Objective:     Visit Vitals  BP (!) 142/92 (BP 1 Location: Left upper arm, BP Patient Position: Sitting, BP Cuff Size: Small adult)   Pulse 83   Temp 98.6 °F (37 °C) (Oral)   Resp 16   Ht 5' 2\" (1.575 m)   Wt 171 lb 9.6 oz (77.8 kg)   SpO2 97%   BMI 31.39 kg/m²     Appearance: alert, well appearing, and in no distress and overweight. General exam: CVS exam BP noted to be mildly elevated today in office, S1, S2 normal, no gallop, no murmur, chest clear, no JVD, no HSM, no edema.   Diabetic foot exam:     Left Foot:   Visual Exam: normal    Pulse DP: 2+ (normal)   Filament test: normal sensation          Right Foot:   Visual Exam: normal    Pulse DP: 2+ (normal)   Filament test: normal sensation          Lab review: orders written for new lab studies as appropriate; see orders. Assessment/Plan:     diabetes control uncertain, hypertension borderline controlled, CKD. orders and follow up as documented in patient record  recommended sodium restriction  recommend cut back ETOH intake. ICD-10-CM ICD-9-CM    1. Type 2 diabetes with nephropathy (HCC)  E11.21 250.40 HEMOGLOBIN A1C WITH EAG     583.81  DIABETES FOOT EXAM   2. Encounter for long-term current use of medication  Z79.899 V58.69 HEPATIC FUNCTION PANEL   3. Hypercholesterolemia  E78.00 272.0 LIPID PANEL   4. Sensation of plugged ear on right side  H93.8X1 388.8 REFERRAL TO ENT-OTOLARYNGOLOGY   5. Nocturia  R35.1 788.43    6. Insomnia, unspecified type  G47.00 780.52    7. Alcohol abuse, daily use  F10.10 305.01       Try to switch time of Metoprolol  mg and diuretics to QHS. Pt does not wish to try sleep med at this time. Encouraged to cut back on alcohol intake.   Refer to ENT

## 2022-03-11 NOTE — PATIENT INSTRUCTIONS
Diabetes and Preventing Falls: Care Instructions  Overview     Complications of diabetessuch as nerve damage, foot problems, and reduced visionmay increase your risk of a fall. Some of your medicines also may add to your risk. By making your home safer, you can lower your risk of falling. Doing things to prevent diabetes complications may also help to lower your risk. You can make your home safer with a few simple measures. Follow-up care is a key part of your treatment and safety. Be sure to make and go to all appointments, and call your doctor if you are having problems. It's also a good idea to know your test results and keep a list of the medicines you take. How can you care for yourself at home? Taking care of yourself  · Keep your blood sugar at a target level (which you set with your doctor). · Exercise regularly to improve your strength, muscle tone, and balance. Walk if you can. Swimming may be a good choice if you cannot walk easily. · Have your vision checked as often as your doctor recommends. It is usually once a year or more often if you have eye problems. · Know the side effects of the medicines you take. Ask your doctor or pharmacist whether the medicines you take can affect your balance. Sleeping pills or sedatives can affect your balance. · Limit the amount of alcohol you drink. Alcohol can impair your balance and other senses. · Have your doctor check your feet during each visit. If you have a foot problem, see your doctor. Preventing falls at home  · Remove raised doorway thresholds, throw rugs, and clutter. Repair loose carpet or raised areas in the floor. · Move furniture and electrical cords to keep them out of walking paths. · Use nonskid floor wax, and wipe up spills right away, especially on ceramic tile floors. · If you use a walker or cane, put rubber tips on it. If you use crutches, clean the bottoms of them regularly with an abrasive pad, such as steel wool.   · Keep your house well lit, especially stairways, porches, and outside walkways. Use night-lights in areas such as hallways and bathrooms. Add extra light switches or use remote switches (such as switches that go on or off when you clap your hands) to make it easier to turn lights on if you have to get up during the night. · Install sturdy handrails on stairways. Put grab bars near your shower, bathtub, and toilet. · Store household items on low shelves so that you do not have to climb or reach high. Or use a reaching device that you can get at a medical supply store. If you have to climb for something, use a step stool with handrails, or ask someone to get it for you. · Keep a cordless phone and a flashlight with new batteries by your bed. If possible, put a phone in each of the main rooms of your house, or carry a cell phone in case you fall and cannot reach a phone. Or you can wear a device around your neck or wrist. You push a button that sends a signal for help. · Wear low-heeled shoes that fit well and give your feet good support. Use footwear with nonskid soles. Check the heels and soles of your shoes for wear. Repair or replace worn heels or soles. · Do not wear socks without shoes on wood floors. · Walk on the grass when the sidewalks are slippery. If you live in an area that gets snow and ice in the winter, sprinkle salt on slippery steps and sidewalks. Where can you learn more? Go to http://www.gray.com/  Enter X601 in the search box to learn more about \"Diabetes and Preventing Falls: Care Instructions. \"  Current as of: September 8, 2021               Content Version: 13.2  © 9200-4971 [employer]. Care instructions adapted under license by [employer] (which disclaims liability or warranty for this information).  If you have questions about a medical condition or this instruction, always ask your healthcare professional. [employer] disclaims any warranty or liability for your use of this information.

## 2022-03-11 NOTE — PROGRESS NOTES
Dear Mr. Wagneryses Ranjana,  Your echo is essentially normal.   Please let me know if you have any questions.    Dr. Fredo Huffman

## 2022-03-15 ENCOUNTER — HOSPITAL ENCOUNTER (OUTPATIENT)
Dept: CT IMAGING | Age: 57
Discharge: HOME | End: 2022-03-15
Attending: NURSE PRACTITIONER
Payer: COMMERCIAL

## 2022-03-15 VITALS
SYSTOLIC BLOOD PRESSURE: 125 MMHG | OXYGEN SATURATION: 94 % | RESPIRATION RATE: 15 BRPM | HEART RATE: 81 BPM | DIASTOLIC BLOOD PRESSURE: 68 MMHG

## 2022-03-15 DIAGNOSIS — R80.9 PROTEINURIA: ICD-10-CM

## 2022-03-15 DIAGNOSIS — N18.30 CHRONIC RENAL DISEASE, STAGE III (HCC): ICD-10-CM

## 2022-03-15 DIAGNOSIS — E11.9 DIABETES MELLITUS (HCC): ICD-10-CM

## 2022-03-15 DIAGNOSIS — I10 ESSENTIAL HYPERTENSION, MALIGNANT: ICD-10-CM

## 2022-03-15 DIAGNOSIS — E66.9 OBESITY, UNSPECIFIED: ICD-10-CM

## 2022-03-15 DIAGNOSIS — E78.5 HYPERLIPEMIA: ICD-10-CM

## 2022-03-15 LAB
BASOPHILS # BLD: 0 K/UL (ref 0–0.1)
BASOPHILS NFR BLD: 1 % (ref 0–1)
DIFFERENTIAL METHOD BLD: ABNORMAL
EOSINOPHIL # BLD: 0.1 K/UL (ref 0–0.4)
EOSINOPHIL NFR BLD: 3 % (ref 0–7)
ERYTHROCYTE [DISTWIDTH] IN BLOOD BY AUTOMATED COUNT: 13.4 % (ref 11.5–14.5)
HCT VFR BLD AUTO: 38.6 % (ref 36.6–50.3)
HGB BLD-MCNC: 13.5 G/DL (ref 12.1–17)
IMM GRANULOCYTES # BLD AUTO: 0 K/UL (ref 0–0.04)
IMM GRANULOCYTES NFR BLD AUTO: 1 % (ref 0–0.5)
INR PPP: 1 (ref 0.9–1.1)
LYMPHOCYTES # BLD: 0.8 K/UL (ref 0.8–3.5)
LYMPHOCYTES NFR BLD: 21 % (ref 12–49)
MCH RBC QN AUTO: 32.3 PG (ref 26–34)
MCHC RBC AUTO-ENTMCNC: 35 G/DL (ref 30–36.5)
MCV RBC AUTO: 92.3 FL (ref 80–99)
MONOCYTES # BLD: 0.5 K/UL (ref 0–1)
MONOCYTES NFR BLD: 12 % (ref 5–13)
NEUTS SEG # BLD: 2.5 K/UL (ref 1.8–8)
NEUTS SEG NFR BLD: 62 % (ref 32–75)
NRBC # BLD: 0 K/UL (ref 0–0.01)
NRBC BLD-RTO: 0 PER 100 WBC
PLATELET # BLD AUTO: 144 K/UL (ref 150–400)
PMV BLD AUTO: 11.2 FL (ref 8.9–12.9)
PROTHROMBIN TIME: 10.3 SEC (ref 9–11.1)
RBC # BLD AUTO: 4.18 M/UL (ref 4.1–5.7)
WBC # BLD AUTO: 3.9 K/UL (ref 4.1–11.1)

## 2022-03-15 PROCEDURE — 77030040395 HC NDL BIOP COAX INTRO MRTM -B

## 2022-03-15 PROCEDURE — 36415 COLL VENOUS BLD VENIPUNCTURE: CPT

## 2022-03-15 PROCEDURE — 74011000272 HC RX REV CODE- 272: Performed by: STUDENT IN AN ORGANIZED HEALTH CARE EDUCATION/TRAINING PROGRAM

## 2022-03-15 PROCEDURE — 88305 TISSUE EXAM BY PATHOLOGIST: CPT

## 2022-03-15 PROCEDURE — 77030003666 HC NDL SPINAL BD -A

## 2022-03-15 PROCEDURE — 77030014115

## 2022-03-15 PROCEDURE — 74011250636 HC RX REV CODE- 250/636: Performed by: STUDENT IN AN ORGANIZED HEALTH CARE EDUCATION/TRAINING PROGRAM

## 2022-03-15 PROCEDURE — 50200 RENAL BIOPSY PERQ: CPT

## 2022-03-15 PROCEDURE — 85610 PROTHROMBIN TIME: CPT

## 2022-03-15 PROCEDURE — 85025 COMPLETE CBC W/AUTO DIFF WBC: CPT

## 2022-03-15 PROCEDURE — 77012 CT SCAN FOR NEEDLE BIOPSY: CPT

## 2022-03-15 PROCEDURE — 99152 MOD SED SAME PHYS/QHP 5/>YRS: CPT

## 2022-03-15 RX ORDER — FENTANYL CITRATE 50 UG/ML
25-100 INJECTION, SOLUTION INTRAMUSCULAR; INTRAVENOUS
Status: DISCONTINUED | OUTPATIENT
Start: 2022-03-15 | End: 2022-03-15

## 2022-03-15 RX ORDER — SODIUM CHLORIDE 9 MG/ML
25 INJECTION, SOLUTION INTRAVENOUS CONTINUOUS
Status: DISCONTINUED | OUTPATIENT
Start: 2022-03-15 | End: 2022-03-15

## 2022-03-15 RX ORDER — MIDAZOLAM HYDROCHLORIDE 1 MG/ML
1-5 INJECTION, SOLUTION INTRAMUSCULAR; INTRAVENOUS
Status: DISCONTINUED | OUTPATIENT
Start: 2022-03-15 | End: 2022-03-15

## 2022-03-15 RX ADMIN — FENTANYL CITRATE 25 MCG: 0.05 INJECTION, SOLUTION INTRAMUSCULAR; INTRAVENOUS at 09:18

## 2022-03-15 RX ADMIN — Medication 1 EACH: at 09:36

## 2022-03-15 RX ADMIN — FENTANYL CITRATE 25 MCG: 0.05 INJECTION, SOLUTION INTRAMUSCULAR; INTRAVENOUS at 09:23

## 2022-03-15 RX ADMIN — MIDAZOLAM 1 MG: 1 INJECTION INTRAMUSCULAR; INTRAVENOUS at 09:23

## 2022-03-15 RX ADMIN — MIDAZOLAM 1 MG: 1 INJECTION INTRAMUSCULAR; INTRAVENOUS at 09:18

## 2022-03-15 NOTE — H&P
Interventional Radiology History and Physical      Patient: Nenita Morris 60 y.o. male  [id number]    Consult Requested by:  Srinivas Bloom NP    Chief Complaint: proteinuria    History of Present Illness: 61 yo male with proteinuria, referred to radiology for image-guided core renal biopsy. History:  Past Medical History:   Diagnosis Date    CAD (coronary artery disease)     Diabetes ([address]     Hx of CABG     Aug 2013 at [facility] in [place]     Hypercholesterolemia     Hypertension      Family History   Problem Relation Age of Onset    No Known Problems Mother     Diabetes Father     Diabetes Sister      Social History     Socioeconomic History    Marital status: SINGLE     Spouse name: Not on file    Number of children: Not on file    Years of education: Not on file    Highest education level: Not on file   Occupational History    Not on file   Tobacco Use    Smoking status: Never Smoker    Smokeless tobacco: Never Used   Vaping Use    Vaping Use: Never used   Substance and Sexual Activity    Alcohol use: Yes     Alcohol/week: 20.0 standard drinks     Types: 20 Shots of liquor per week     Comment: whisky     Drug use: Never    Sexual activity: Not Currently   Other Topics Concern    Not on file   Social History Narrative    Not on file     Social Determinants of Health     Financial Resource Strain:     Difficulty of Paying Living Expenses: Not on file   Food Insecurity:     Worried About Running Out of Food in the Last Year: Not on file    Jose of Food in the Last Year: Not on file   Transportation Needs:     Lack of Transportation (Medical): Not on file    Lack of Transportation (Non-Medical):  Not on file   Physical Activity:     Days of Exercise per Week: Not on file    Minutes of Exercise per Session: Not on file   Stress:     Feeling of Stress : Not on file   Social Connections:     Frequency of Communication with Friends and Family: Not on file    Frequency of Social Gatherings with Friends and Family: Not on file    Attends Mormonism Services: Not on file    Active Member of Clubs or Organizations: Not on file    Attends Club or Organization Meetings: Not on file    Marital Status: Not on file   Intimate Partner Violence:     Fear of Current or Ex-Partner: Not on file    Emotionally Abused: Not on file    Physically Abused: Not on file    Sexually Abused: Not on file   Housing Stability:     Unable to Pay for Housing in the Last Year: Not on file    Number of [place] in the Last Year: Not on file    Unstable Housing in the Last Year: Not on file       Allergies: Allergies   Allergen Reactions    Lisinopril Cough       Current Medications:  Current Outpatient Medications   Medication Sig    losartan (COZAAR) 100 mg tablet Take 1 Tablet by mouth daily. Indications: high blood pressure    hydroCHLOROthiazide (HYDRODIURIL) 25 mg tablet Take 1 Tablet by mouth daily. Indications: high blood pressure    glipiZIDE SR (GLUCOTROL XL) 5 mg CR tablet TAKE 2 TABLETS BY MOUTH TWICE A DAY WITH BREAKFAST AND DINNER FOR TYPE 2 DIABETES MELLITUS    rosuvastatin (CRESTOR) 20 mg tablet TAKE 1 TABLET BY MOUTH EVERY DAY AT NIGHT    amLODIPine (NORVASC) 5 mg tablet Take 1 Tablet by mouth once over twenty-four (24) hours.  spironolactone (ALDACTONE) 25 mg tablet Take 0.5 Tablets by mouth daily.  isosorbide mononitrate ER (IMDUR) 30 mg tablet TAKE 1 TABLET BY MOUTH EVERY DAY IN THE MORNING    ofloxacin (FLOXIN) 0.3 % otic solution 4 DROPS TWICE A DAY AS NEEDED AFFECTED EAR(S) (Patient not taking: Reported on 3/11/2022)    metFORMIN (GLUCOPHAGE) 500 mg tablet TAKE 2 TABLETS BY MOUTH TWICE A DAY WITH MORNING AND EVENING MEALS (Patient taking differently: Take 500 mg by mouth two (2) times daily (with meals).  TAKE 2 TABLETS BY MOUTH TWICE A DAY WITH MORNING AND EVENING MEALS (pt reports only taking one 500 mg tablet BID d/t s/e of diarrhea))    metoprolol succinate (TOPROL-XL) 200 mg XL tablet Take 1 Tab by mouth daily.  aspirin delayed-release 81 mg tablet Take  by mouth daily. (Patient not taking: Reported on 3/11/2022)    multivitamin (ONE A DAY) tablet Take 1 Tab by mouth daily. Current Facility-Administered Medications   Medication Dose Route Frequency    fentaNYL citrate (PF) injection  mcg   mcg IntraVENous RAD PRN    midazolam (VERSED) injection 1-5 mg  1-5 mg IntraVENous RAD PRN    0.9% sodium chloride infusion  25 mL/hr IntraVENous CONTINUOUS    gelatin adsorbable (GELFOAM) 12-7 mm sponge 1 Each  1 Each Topical PRN        Review of Systems:  Patient reports chronic leg edema. Patient denies fever, chills, cough, headache, vision changes, difficulty swallowing, shortness of breath, chest pain, abdominal pain, nausea, vomiting, changes in bladder or bowel habits, extremity weakness, numbness, tingling. The remainder of the review of systems is negative for any additional contributing elements. Physical Exam:  Blood pressure 122/72, pulse 80, resp. rate 22, SpO2 99 %. GENERAL: alert, cooperative, no distress, appears stated age,   LUNG: clear to auscultation bilaterally,   HEART: regular rate and rhythm,   ABDOMEN: soft, non-tender. Bowel sounds normal.   EXTREMITIES:  extremities normal, atraumatic; edema BLE non-pitting. NEUROLOGIC: AOx3. Cranial nerves 2-12 and sensation grossly intact. Laboratory:      Recent Labs     03/15/22  0758   HGB 13.5   HCT 38.6   WBC 3.9*   *   INR 1.0       Imaging:  No results found. Impression/Plan:  Patient has been evaluated and deemed an appropriate candidate for intravenous sedation. Based on history and presentation he is a candidate for CT-guided core renal biopsy. The above procedure was explained to the patient/consenting party. Benefits, risks and alternative therapies reviewed and all questions answered to his satisfaction. At this time he wishes to proceed.      Please note that Dr. Noemi Bauman participated in the provision of these services. We appreciate the kind consultation and the opportunity to participate in Jaimee Neg care. Aldair Boyer PA-C  Interventional Radiology  [facility]  [facility]  [phone]  [facility] [phone]      CC:  Elicia Witt NP

## 2022-03-15 NOTE — DISCHARGE INSTRUCTIONS
Patient Education   Patient Education        Needle Biopsy of the Kidney: What to Expect at Home  Your Recovery     A kidney biopsy is a test to take a sample (biopsy) of kidney. The doctor puts a long needle through your back (flank) into the kidney. Another doctor will look at the kidney tissue with a microscope to check for problems. After the test, you will be told to lie down on your back for several hours. After this, you should avoid strenuous activity for the next 2 to 3 days. It's normal to feel some soreness in the area of the biopsy for 2 to 3 days. You may have a small amount of bleeding on the bandage after the test. You may notice some blood in your urine after the test. This should go away within 12 to 24 hours. If it doesn't, call your doctor. This care sheet gives you a general idea about how long it will take for you to recover. But each person recovers at a different pace. Follow the steps below to feel better as quickly as possible. How can you care for yourself at home? Activity    · Avoid lifting anything that would make you strain. This may include heavy grocery bags and milk containers, a heavy briefcase or backpack, cat litter or dog food bags, a vacuum , or a child.     · Avoid strenuous activities, such as bicycle riding, jogging, weight lifting, or aerobic exercise, until your doctor says it is okay.     · Try to walk each day. Start by walking a little more than you did the day before. Bit by bit, increase the amount you walk. Walking boosts blood flow and helps prevent pneumonia and constipation.     · Rest when you feel tired. Getting enough sleep will help you recover.     · Ask your doctor when it is okay for you to have sex. Diet    · You can eat your normal diet. If your stomach is upset, try bland, low-fat foods like plain rice, broiled chicken, toast, and yogurt.     · Drink plenty of fluids to avoid becoming dehydrated.    Medicines    · Your doctor will tell you if and when you can restart your medicines. He or she will also give you instructions about taking any new medicines.     · If you take aspirin or some other blood thinner, ask your doctor if and when to start taking it again. Make sure that you understand exactly what your doctor wants you to do.     · Do not take aspirin or anti-inflammatory medicines for a week after the biopsy. Incision care    · Keep a bandage over the puncture site for the first 1 or 2 days. Follow-up care is a key part of your treatment and safety. Be sure to make and go to all appointments, and call your doctor if you are having problems. It's also a good idea to know your test results and keep a list of the medicines you take. When should you call for help? Call 911 anytime you think you may need emergency care. For example, call if:    · You passed out (lost consciousness). Call your doctor now or seek immediate medical care if:    · You have signs of infection, such as:  ? Increased pain, swelling, warmth, or redness. ? Red streaks leading from the puncture site. ? Pus draining from the puncture site. ? A fever.     · Bright red blood has soaked through the bandage over the puncture site.     · You have new or worse pain at the puncture site. Watch closely for any changes in your health, and call your doctor if:    · You have blood in your urine for more than 1 day. Where can you learn more? Go to http://www.gray.com/  Enter S675 in the search box to learn more about \"Needle Biopsy of the Kidney: What to Expect at Home. \"  Current as of: September 8, 2021               Content Version: 13.2  © 2006-2022 [employer]. Care instructions adapted under license by [employer] (which disclaims liability or warranty for this information).  If you have questions about a medical condition or this instruction, always ask your healthcare professional. [address] any warranty or liability for your use of this information. Learning About Sedation  What is sedation? Sedation is medicine that helps you feel relaxed and comfortable. It may be used with an injection to numb the area or other medicine to reduce pain. It is often used in procedures like a colonoscopy or a biopsy. It is also used in many minor surgeries. You may be awake and able to talk with your care team. Or you may fall asleep. You might remember little, if anything, of the procedure. What are the levels of sedation? There are three levels of sedation. You may start at one level and go to a deeper level during your procedure. Your doctor may give you oxygen to make sure your blood has plenty of oxygen while you're sleepy. Minimal.  In the lowest level of sedation, you are awake and relaxed and can do what the doctor asks you to do. You can understand questions and answer them. It can help you feel calm during your procedure. And it can be used if deeper levels of sedation are less safe for you. Minimal sedation is used for very minor procedures such as the removal of a small skin lesion or a vasectomy. Moderate. You are relaxed and feel drowsy. You may fall asleep, but someone can wake you easily. Afterward, you may not remember anything about your procedure. Moderate sedation is used for more uncomfortable procedures like small hernia repairs, some eye surgeries, or colonoscopies. Deep. You are asleep (unconscious) during your procedure. You will get oxygen and may need help with breathing. You probably won't remember anything. Deep sedation is used during procedures like hand or foot surgeries or tests that can make you very uncomfortable. How do you prepare? Your doctor will tell you what to expect when you have sedation. You will be asked to sign a consent form that says you understand the risks. You will get instructions to help you prepare for your procedure.  You'll be told when to stop eating or drinking. If you take medicine, you will be told what you can and can't take beforehand. Do not smoke for as long as possible, but at least 1 month, before your procedure. Smoking can increase your risk of problems under sedation and can delay your recovery. If you need help quitting, talk to your doctor about stop-smoking programs and medicines. These can increase your chances of quitting for good. Arrange for a friend or a family member to drive you home afterward. Don't plan to drive yourself. How is it done? Sedation is usually given in a vein in the arm (intravenously, or IV). It is often used with local or regional anesthesia. The local type numbs a small part of the body. The regional type blocks pain to a larger area of the body. With lighter levels of sedation, a doctor or nurse will watch your vital signs. This includes checking your breathing, blood pressure, and heart rate. With deeper levels, an anesthesia professional may be there during the procedure to help keep you safe. This is often called monitored anesthesia care (MAC). What are the risks? Serious problems are rare. They include breathing that slows or stops and an allergic reaction to the medicine. Some health issues may increase the risk of problems. These include:  · Smoking. · Sleep apnea. This happens during sleep when a blocked airway causes breathing problems. · Being overweight. What can you expect after sedation? After your procedure, you will have time to let the sedation wear off. You may feel some pain or discomfort from your procedure. If you have pain, don't be afraid to say so. Pain medicine works better if you take it before the pain gets bad. You may feel some of the side effects of sedation for a while. They include:  · Feeling tired or sleepy. · Nausea and vomiting. This is rare and does not last long. You may be given medicine to help you feel better. · A headache.   If you've had sedation, wait 24 hours before you drive or operate heavy machinery, make important decisions, go to work or school, or sign legal documents. It takes time for the medicine's effects to completely wear off. Current as of: October 6, 2021               Content Version: 13.2  © 6565-6269 [employer]. Care instructions adapted under license by [employer] (which disclaims liability or warranty for this information). If you have questions about a medical condition or this instruction, always ask your healthcare professional. [address] any warranty or liability for your use of this information.

## 2022-03-15 NOTE — PROGRESS NOTES
0745 Patient brought to [facility] for scheduled CT Guided Renal Biopsy with conscious sedation    0745 Initial VS recorded/allergies reviewed, Pt monitored x 3, A/O x 4, IV start and labs sent per protocal.    0750 Nina MENDENHALL at bedside to review procedure and sedation plan, CONSENT obtained with Pt/RN. 4680 Pt taken to CT, Procedure started at [id number] and ended at [address] Pt received a total of 50 mcg FENTANYL and 2 mg of VERSED during the procedure. Pt tolerated procedure well. Pain 0/10. Pt VS monitored x 3 (see doc flowsheet). 0945 Pt back to Radiology/Holding. 1007 Procedure site c/d/i. Pt reporting 0/10 pain at site, reports tolerable. 1027 Pt. resting in stretcher. Fluids and snacks provided. Dressing c/d/i. Pt will be in recovery for approximately 3 hour. 1223 Discharge instructions reviewed with Pt/SO; patient verbalizes understanding; time allowed for questions/clarifications. I/V removed, dressing applied. Pt is able to dress self and escorted via wheelchair to vehicle.

## 2022-03-17 ENCOUNTER — LAB ONLY (OUTPATIENT)
Dept: FAMILY MEDICINE CLINIC | Age: 57
End: 2022-03-17
Payer: COMMERCIAL

## 2022-03-17 DIAGNOSIS — E78.00 HYPERCHOLESTEROLEMIA: ICD-10-CM

## 2022-03-17 DIAGNOSIS — E11.21 TYPE 2 DIABETES WITH NEPHROPATHY (HCC): ICD-10-CM

## 2022-03-17 DIAGNOSIS — I10 PRIMARY HYPERTENSION: ICD-10-CM

## 2022-03-17 DIAGNOSIS — Z79.899 ENCOUNTER FOR LONG-TERM CURRENT USE OF MEDICATION: ICD-10-CM

## 2022-03-17 PROCEDURE — 3051F HG A1C>EQUAL 7.0%<8.0%: CPT

## 2022-03-18 PROBLEM — Z79.899 ENCOUNTER FOR LONG-TERM CURRENT USE OF MEDICATION: Status: ACTIVE | Noted: 2020-07-29

## 2022-03-18 LAB
ALBUMIN SERPL-MCNC: 4 G/DL (ref 3.5–5)
ALBUMIN/GLOB SERPL: 1.5 {RATIO} (ref 1.1–2.2)
ALP SERPL-CCNC: 63 U/L (ref 45–117)
ALT SERPL-CCNC: 40 U/L (ref 12–78)
AST SERPL-CCNC: 19 U/L (ref 15–37)
BILIRUB DIRECT SERPL-MCNC: 0.3 MG/DL (ref 0–0.2)
BILIRUB SERPL-MCNC: 0.9 MG/DL (ref 0.2–1)
CHOLEST SERPL-MCNC: 124 MG/DL
EST. AVERAGE GLUCOSE BLD GHB EST-MCNC: 160 MG/DL
GLOBULIN SER CALC-MCNC: 2.6 G/DL (ref 2–4)
HBA1C MFR BLD: 7.2 % (ref 4–5.6)
HDLC SERPL-MCNC: 51 MG/DL
HDLC SERPL: 2.4 {RATIO} (ref 0–5)
LDLC SERPL CALC-MCNC: 27.4 MG/DL (ref 0–100)
PROT SERPL-MCNC: 6.6 G/DL (ref 6.4–8.2)
TRIGL SERPL-MCNC: 228 MG/DL (ref ?–150)
VLDLC SERPL CALC-MCNC: 45.6 MG/DL

## 2022-03-19 PROBLEM — F51.01 PRIMARY INSOMNIA: Status: ACTIVE | Noted: 2022-03-11

## 2022-03-19 PROBLEM — R35.1 NOCTURIA: Status: ACTIVE | Noted: 2022-03-11

## 2022-03-19 PROBLEM — F10.10 ALCOHOL ABUSE, DAILY USE: Status: ACTIVE | Noted: 2022-03-11

## 2022-03-19 PROBLEM — I10 HYPERTENSION: Status: ACTIVE | Noted: 2019-12-11

## 2022-03-20 PROBLEM — E11.21 TYPE 2 DIABETES WITH NEPHROPATHY (HCC): Status: ACTIVE | Noted: 2020-07-29

## 2022-03-20 PROBLEM — I25.10 CORONARY ARTERY DISEASE INVOLVING NATIVE CORONARY ARTERY OF NATIVE HEART: Status: ACTIVE | Noted: 2019-12-11

## 2022-03-20 PROBLEM — E78.00 HYPERCHOLESTEROLEMIA: Status: ACTIVE | Noted: 2019-12-11

## 2022-03-21 ENCOUNTER — TELEPHONE (OUTPATIENT)
Dept: FAMILY MEDICINE CLINIC | Age: 57
End: 2022-03-21

## 2022-03-21 NOTE — TELEPHONE ENCOUNTER
----- Message from Delonte Mcgovern MD sent at 3/20/2022  6:12 PM EDT -----  Good cholesterol numbers. Stable diabetes control. Normal liver function.

## 2022-03-22 DIAGNOSIS — I10 ESSENTIAL HYPERTENSION: ICD-10-CM

## 2022-03-22 DIAGNOSIS — I25.10 CORONARY ARTERY DISEASE INVOLVING NATIVE CORONARY ARTERY OF NATIVE HEART WITHOUT ANGINA PECTORIS: ICD-10-CM

## 2022-03-22 RX ORDER — METOPROLOL SUCCINATE 200 MG/1
200 TABLET, EXTENDED RELEASE ORAL DAILY
Qty: 90 TABLET | Refills: 3 | Status: SHIPPED | OUTPATIENT
Start: 2022-03-22 | End: 2022-04-21 | Stop reason: SDUPTHER

## 2022-03-22 RX ORDER — ISOSORBIDE MONONITRATE 30 MG/1
30 TABLET, EXTENDED RELEASE ORAL DAILY
Qty: 90 TABLET | Refills: 1 | Status: SHIPPED | OUTPATIENT
Start: 2022-03-22 | End: 2022-06-15

## 2022-03-25 ENCOUNTER — TELEPHONE (OUTPATIENT)
Dept: FAMILY MEDICINE CLINIC | Age: 57
End: 2022-03-25

## 2022-03-25 NOTE — TELEPHONE ENCOUNTER
----- Message from Darci Norris sent at 3/25/2022 10:15 AM EDT -----  Subject: Message to Provider    QUESTIONS  Information for Provider? Delano Pérez from pt's nephrology providers' office is   calling to advise pcp that they are starting pt on Farxiga 5mg daily. If   there are any other meds she will call to update pcp on status of pt. Delano Pérez can be reached @ number below to discuss. Thank you so much!  ---------------------------------------------------------------------------  --------------  CALL BACK INFO  What is the best way for the office to contact you? OK to leave message on   voicemail  Preferred Call Back Phone Number? [phone]  ---------------------------------------------------------------------------  --------------  SCRIPT ANSWERS  Relationship to Patient? Third Party  Third Party Type? Physician Office? Representative Name?  Delano Pérez (nephrology provider)

## 2022-04-21 RX ORDER — ASPIRIN 81 MG/1
81 TABLET ORAL DAILY
Qty: 90 TABLET | Refills: 3
Start: 2022-04-21

## 2022-04-21 RX ORDER — METOPROLOL SUCCINATE 200 MG/1
100 TABLET, EXTENDED RELEASE ORAL 2 TIMES DAILY
Qty: 90 TABLET | Refills: 3
Start: 2022-04-21 | End: 2022-08-12 | Stop reason: SDUPTHER

## 2022-05-07 DIAGNOSIS — I10 ESSENTIAL HYPERTENSION: ICD-10-CM

## 2022-05-09 RX ORDER — METOPROLOL SUCCINATE 200 MG/1
TABLET, EXTENDED RELEASE ORAL
Qty: 90 TABLET | Refills: 3 | OUTPATIENT
Start: 2022-05-09

## 2022-05-09 NOTE — TELEPHONE ENCOUNTER
Refill per VO of Dr. Jil Spatz  Last appt: 3/10/2022  Future Appointments   Date Time Provider Katie Ndiaye   5/19/2022  3:40 PM Sean Anglin MD [facility] AMB       Requested Prescriptions     Refused Prescriptions Disp Refills    metoprolol succinate (TOPROL-XL) 200 mg XL tablet [Pharmacy Med Name: METOPROLOL SUCC  MG TAB] 90 Tablet 3     Sig: TAKE 1 TABLET BY MOUTH EVERY DAY     Refused By: Shay Romero     Reason for Refusal: Request already responded to by other means (e.g. phone or fax)

## 2022-06-10 DIAGNOSIS — I10 ESSENTIAL HYPERTENSION: ICD-10-CM

## 2022-06-10 RX ORDER — HYDROCHLOROTHIAZIDE 25 MG/1
TABLET ORAL
Qty: 90 TABLET | Refills: 1 | Status: SHIPPED | OUTPATIENT
Start: 2022-06-10

## 2022-06-10 RX ORDER — LOSARTAN POTASSIUM 100 MG/1
TABLET ORAL
Qty: 90 TABLET | Refills: 1 | Status: SHIPPED | OUTPATIENT
Start: 2022-06-10

## 2022-06-15 DIAGNOSIS — I10 ESSENTIAL HYPERTENSION: ICD-10-CM

## 2022-06-15 DIAGNOSIS — I25.10 CORONARY ARTERY DISEASE INVOLVING NATIVE CORONARY ARTERY OF NATIVE HEART WITHOUT ANGINA PECTORIS: ICD-10-CM

## 2022-06-15 RX ORDER — ISOSORBIDE MONONITRATE 30 MG/1
TABLET, EXTENDED RELEASE ORAL
Qty: 90 TABLET | Refills: 1 | Status: SHIPPED | OUTPATIENT
Start: 2022-06-15

## 2022-07-07 DIAGNOSIS — E78.00 HYPERCHOLESTEROLEMIA: ICD-10-CM

## 2022-07-07 RX ORDER — ROSUVASTATIN CALCIUM 20 MG/1
TABLET, COATED ORAL
Qty: 90 TABLET | Refills: 1 | Status: SHIPPED | OUTPATIENT
Start: 2022-07-07

## 2022-07-07 RX ORDER — SPIRONOLACTONE 25 MG/1
TABLET ORAL
Qty: 45 TABLET | Refills: 3 | Status: SHIPPED | OUTPATIENT
Start: 2022-07-07

## 2022-08-04 DIAGNOSIS — E11.69 TYPE 2 DIABETES MELLITUS WITH OTHER SPECIFIED COMPLICATION, WITHOUT LONG-TERM CURRENT USE OF INSULIN (HCC): ICD-10-CM

## 2022-08-04 DIAGNOSIS — I10 ESSENTIAL HYPERTENSION: ICD-10-CM

## 2022-08-04 RX ORDER — METOPROLOL SUCCINATE 200 MG/1
TABLET, EXTENDED RELEASE ORAL
Qty: 90 TABLET | Refills: 3 | OUTPATIENT
Start: 2022-08-04

## 2022-08-04 RX ORDER — GLIPIZIDE 5 MG/1
TABLET, FILM COATED, EXTENDED RELEASE ORAL
Qty: 360 TABLET | Refills: 0 | Status: SHIPPED | OUTPATIENT
Start: 2022-08-04 | End: 2022-10-28

## 2022-08-04 NOTE — TELEPHONE ENCOUNTER
Refill per VO of Dr. Anthony Hannon  Last appt: 5/19/2022  No future appointments.     Requested Prescriptions     Refused Prescriptions Disp Refills    metoprolol succinate (TOPROL-XL) 200 mg XL tablet [Pharmacy Med Name: METOPROLOL SUCC  MG TAB] 90 Tablet 3     Sig: TAKE 1 TABLET BY MOUTH EVERY DAY     Refused By: Elizabeth Barth     Reason for Refusal: Medication dose changed

## 2022-08-09 RX ORDER — AMLODIPINE BESYLATE 5 MG/1
5 TABLET ORAL
Qty: 90 TABLET | Refills: 3 | OUTPATIENT
Start: 2022-08-09

## 2022-08-09 NOTE — TELEPHONE ENCOUNTER
Refill per VO of Dr. Kami Mayorga  Last appt: 5/19/2022  No future appointments. Requested Prescriptions     Refused Prescriptions Disp Refills    amLODIPine (NORVASC) 5 mg tablet [Pharmacy Med Name: AMLODIPINE BESYLATE 5 MG TAB] 90 Tablet 3     Sig: TAKE 1 TABLET BY MOUTH ONCE OVER TWENTY-FOUR (24) HOURS.      Refused By: Beni Quiros     Reason for Refusal: Patient has requested refill too soon

## 2022-08-11 ENCOUNTER — TELEPHONE (OUTPATIENT)
Dept: CARDIOLOGY CLINIC | Age: 57
End: 2022-08-11

## 2022-08-11 DIAGNOSIS — I10 ESSENTIAL HYPERTENSION: ICD-10-CM

## 2022-08-11 NOTE — TELEPHONE ENCOUNTER
Pt requested a refill on amlodipine and refill was refused, message stated pt requested refill too soon, pt has been out of medication for two weeks, also [facility] for [facility]ine please advise      [phone]    [phone]

## 2022-08-12 RX ORDER — METOPROLOL SUCCINATE 200 MG/1
100 TABLET, EXTENDED RELEASE ORAL 2 TIMES DAILY
Qty: 90 TABLET | Refills: 0 | Status: SHIPPED | OUTPATIENT
Start: 2022-08-12

## 2022-08-12 NOTE — TELEPHONE ENCOUNTER
Metoprolol 200 1/2 tab BID [was no print on 4/21/22]  Amlodipine 5 mg BID    Called pharmacy,  Amlodipine was picked up on the 5th  Metoprolol hasn't been picked up with February. Sent Yakimbi msg.

## 2022-11-02 DIAGNOSIS — I10 ESSENTIAL HYPERTENSION: ICD-10-CM

## 2022-11-03 RX ORDER — METOPROLOL SUCCINATE 200 MG/1
100 TABLET, EXTENDED RELEASE ORAL 2 TIMES DAILY
Qty: 90 TABLET | Refills: 0 | OUTPATIENT
Start: 2022-11-03

## 2022-11-03 NOTE — TELEPHONE ENCOUNTER
Refill per VO of Dr. Van Rosado  Last appt: 3/10/22  NOV needed x2 mos    Requested Prescriptions     Refused Prescriptions Disp Refills    metoprolol succinate (TOPROL-XL) 200 mg XL tablet [Pharmacy Med Name: METOPROLOL SUCC  MG TAB] 90 Tablet 0     Sig: TAKE 0.5 TABLETS BY MOUTH TWO (2) TIMES A DAY. APPOINTMENT NEEDED AT [facility] FOR FURTHER REFILLS.      Refused By: Eden Easton     Reason for Refusal: Appt required, please call patient

## 2022-11-16 DIAGNOSIS — E11.69 TYPE 2 DIABETES MELLITUS WITH OTHER SPECIFIED COMPLICATION, WITHOUT LONG-TERM CURRENT USE OF INSULIN (HCC): ICD-10-CM

## 2022-11-16 DIAGNOSIS — I25.10 CORONARY ARTERY DISEASE INVOLVING NATIVE CORONARY ARTERY OF NATIVE HEART WITHOUT ANGINA PECTORIS: ICD-10-CM

## 2022-11-16 DIAGNOSIS — I10 ESSENTIAL HYPERTENSION: ICD-10-CM

## 2022-11-16 RX ORDER — AMLODIPINE BESYLATE 5 MG/1
5 TABLET ORAL
Qty: 90 TABLET | Refills: 3 | OUTPATIENT
Start: 2022-11-16

## 2022-11-16 RX ORDER — HYDROCHLOROTHIAZIDE 25 MG/1
TABLET ORAL
Qty: 90 TABLET | Refills: 0 | Status: SHIPPED | OUTPATIENT
Start: 2022-11-16 | End: 2022-11-29 | Stop reason: SDUPTHER

## 2022-11-16 RX ORDER — GLIPIZIDE 5 MG/1
TABLET, FILM COATED, EXTENDED RELEASE ORAL
Qty: 360 TABLET | Refills: 0 | Status: SHIPPED | OUTPATIENT
Start: 2022-11-16 | End: 2022-11-29 | Stop reason: SDUPTHER

## 2022-11-16 RX ORDER — LOSARTAN POTASSIUM 100 MG/1
TABLET ORAL
Qty: 90 TABLET | Refills: 0 | Status: SHIPPED | OUTPATIENT
Start: 2022-11-16 | End: 2022-11-29 | Stop reason: SDUPTHER

## 2022-11-16 RX ORDER — ISOSORBIDE MONONITRATE 30 MG/1
TABLET, EXTENDED RELEASE ORAL
Qty: 90 TABLET | Refills: 0 | Status: SHIPPED | OUTPATIENT
Start: 2022-11-16 | End: 2022-11-29 | Stop reason: SDUPTHER

## 2022-11-16 NOTE — TELEPHONE ENCOUNTER
Refill per VO of Dr. Florentino Muñoz  Last appt: 5/19/2022  Future Appointments   Date Time Provider Katie Ndiaye   11/29/2022  0:23 AM Isaura Sharma MD [facility]       Requested Prescriptions     Refused Prescriptions Disp Refills    amLODIPine (NORVASC) 5 mg tablet [Pharmacy Med Name: AMLODIPINE BESYLATE 5 MG TAB] 90 Tablet 3     Sig: TAKE 1 TABLET BY MOUTH ONCE OVER TWENTY-FOUR (24) HOURS.      Refused By: Deena Jerome     Reason for Refusal: Patient has requested refill too soon

## 2022-11-25 ENCOUNTER — TELEPHONE (OUTPATIENT)
Dept: CARDIOLOGY CLINIC | Age: 57
End: 2022-11-25

## 2022-11-25 DIAGNOSIS — I10 ESSENTIAL HYPERTENSION: ICD-10-CM

## 2022-11-25 RX ORDER — METOPROLOL SUCCINATE 200 MG/1
100 TABLET, EXTENDED RELEASE ORAL 2 TIMES DAILY
Qty: 30 TABLET | Refills: 0 | Status: SHIPPED | OUTPATIENT
Start: 2022-11-25

## 2022-11-25 RX ORDER — METOPROLOL SUCCINATE 200 MG/1
100 TABLET, EXTENDED RELEASE ORAL 2 TIMES DAILY
Qty: 90 TABLET | OUTPATIENT
Start: 2022-11-25

## 2022-11-25 NOTE — TELEPHONE ENCOUNTER
Pt has been without his medication metoprolol succinate (TOPROL-XL) 200 mg XL tablet for two weeks.      [facility]   [phone]    PT #   [phone]

## 2022-11-25 NOTE — TELEPHONE ENCOUNTER
Refill per VO of Dr. Hilary Painting  Last appt: 3/10/22  NOV needed x2 mos. Requested Prescriptions     Signed Prescriptions Disp Refills    metoprolol succinate (TOPROL-XL) 200 mg XL tablet 30 Tablet 0     Sig: Take 0.5 Tablets by mouth two (2) times a day. Appointment needed at [place] for further refills.      Authorizing Provider: Nelida Hays     Ordering User: Alexia Rowe

## 2022-11-28 NOTE — PROGRESS NOTES
Subjective:     Janet Merchant is a 59 y.o. male who presents for follow up of diabetes, hypertension, hyperlipidemia, and coronary artery disease. Diet and Lifestyle: generally follows a low fat low cholesterol diet, generally follows a low sodium diet, sedentary, nonsmoker  Home BP Monitoring: is not measured at home    Cardiovascular ROS: not taking medications regularly as instructed, no TIA's, no chest pain on exertion, no dyspnea on exertion, no swelling of ankles. New concerns: pt has had trouble getting refills from Cardiology office. He is aware he needs to set up appt to see Dr Josué Ford but is waiting to hear from them. He does not like to use My Chart. Been out of Metoprolol x 2 weeks. I explained that chart shows a refill was sent to [facility] 11/25 by Dr Oscar Cross. Pt claims he has not heard from [facility] to . Also C/O ear fullness R ear. Feels his vent tube is out. Patient Active Problem List    Diagnosis Date Noted    Primary insomnia 03/11/2022    Alcohol abuse, daily use 03/11/2022    Nocturia 03/11/2022    Type 2 diabetes with nephropathy ([facility] [address] 07/29/2020    Encounter for long-term current use of medication 07/29/2020    CAD (coronary artery disease)     Hx of CABG     Hypertension 12/11/2019    Hypercholesterolemia 12/11/2019    Coronary artery disease involving native coronary artery of native heart 12/11/2019     Current Outpatient Medications   Medication Sig Dispense Refill    metFORMIN (GLUCOPHAGE) 500 mg tablet TAKE 2 TABLETS BY MOUTH TWICE A DAY WITH MORNING AND EVENING MEALS 360 Tablet 3    rosuvastatin (CRESTOR) 20 mg tablet Take 1 Tablet by mouth nightly.  90 Tablet 3    glipiZIDE SR (GLUCOTROL XL) 5 mg CR tablet TAKE 2 TABLETS BY MOUTH TWICE A DAY WITH BREAKFAST AND DINNER FOR TYPE 2 DIABETES MELLITUS 360 Tablet 3    hydroCHLOROthiazide (HYDRODIURIL) 25 mg tablet TAKE 1 TABLET BY MOUTH EVERY DAY FOR BLOOD PRESSURE 90 Tablet 3    isosorbide mononitrate ER (IMDUR) 30 mg tablet Take 1 Tablet by mouth daily. 90 Tablet 3    losartan (COZAAR) 100 mg tablet Take 1 Tablet by mouth daily. 90 Tablet 3    spironolactone (ALDACTONE) 25 mg tablet TAKE 1/2 TABLET BY MOUTH EVERY DAY 45 Tablet 3    aspirin delayed-release 81 mg tablet Take 1 Tablet by mouth daily. 90 Tablet 3    dapagliflozin (FARXIGA) 5 mg tab tablet Take 5 mg by mouth daily. amLODIPine (NORVASC) 5 mg tablet Take 1 Tablet by mouth once over twenty-four (24) hours. 90 Tablet 3    multivitamin (ONE A DAY) tablet Take 1 Tab by mouth daily. metoprolol succinate (TOPROL-XL) 200 mg XL tablet Take 0.5 Tablets by mouth two (2) times a day. Appointment needed at [facility] for further refills. (Patient not taking: Reported on 11/29/2022) 30 Tablet 0     Allergies   Allergen Reactions    Lisinopril Cough     Past Medical History:   Diagnosis Date    CAD (coronary artery disease)     Diabetes ([address]     Hx of CABG     Aug 2013 at [facility] in [facility]     Hypercholesterolemia     Hypertension      Past Surgical History:   Procedure Laterality Date    HX CORONARY ARTERY BYPASS GRAFT  08/28/2013    HX HEART CATHETERIZATION  05/2019    HX HEENT Left 1990's    eye vitrectomy    HX VITRECTOMY Left 1997    [place] CABG, ARTERY-VEIN, THREE  2013     Family History   Problem Relation Age of Onset    No Known Problems Mother     Diabetes Father     Diabetes Sister      Social History     Tobacco Use    Smoking status: Never    Smokeless tobacco: Never   Substance Use Topics    Alcohol use: Yes     Alcohol/week: 20.0 standard drinks     Types: 20 Shots of liquor per week     Comment: whisky              Review of Systems, additional:  Pertinent items are noted in HPI.     Objective:     Visit Vitals  BP (!) 150/78 (BP 1 Location: Left arm, BP Patient Position: Sitting, BP Cuff Size: Adult)   Pulse (!) 106   Temp 97.7 °F (36.5 °C) (Temporal)   Resp 20   Ht 5' 2\" (1.575 m)   Wt 169 lb (76.7 kg)   SpO2 95%   BMI 30.91 kg/m²     Appearance: alert, well appearing, and in no distress and overweight. General exam: [facility] exam BP noted to be moderately elevated today in office, S1, S2 normal, no gallop, no murmur, chest clear, no JVD, no HSM, no edema. Lab review: orders written for new lab studies as appropriate; see orders. Assessment/Plan:     . Brendan Hopkins ICD-10-CM ICD-9-CM    1. Essential hypertension  I10 401.9 hydroCHLOROthiazide (HYDRODIURIL) 25 mg tablet      isosorbide mononitrate ER (IMDUR) 30 mg tablet      losartan (COZAAR) 100 mg tablet      2. Hypercholesterolemia  E78.00 272.0 LIPID PANEL      rosuvastatin (CRESTOR) 20 mg tablet      3. Type 2 diabetes with nephropathy (HCC)  E11.21 250.40 MICROALBUMIN, UR, RAND W/ MICROALB/CREAT RATIO     583.81 HEMOGLOBIN A1C WITH EAG      4. Coronary artery disease involving native coronary artery of native heart, unspecified whether angina present  I25.10 414.01       5. Encounter for long-term current use of medication  Z79.899 V58.69 CBC WITH AUTOMATED DIFF      METABOLIC PANEL, COMPREHENSIVE      6. Needs flu shot  Z23 V04.81 INFLUENZA, FLUARIX, FLULAVAL, FLUZONE (AGE 6 MO+), AFLURIA(AGE 3Y+) IM, PF, 0.5 ML      7. Type 2 diabetes mellitus with other specified complication, without long-term current use of insulin (HCC)  E11.69 250.80 metFORMIN (GLUCOPHAGE) 500 mg tablet      glipiZIDE SR (GLUCOTROL XL) 5 mg CR tablet      8. Coronary artery disease involving native coronary artery of native heart without angina pectoris  I25.10 414.01 isosorbide mononitrate ER (IMDUR) 30 mg tablet      9. Ear pressure, right  H93.8X1 388.8 REFERRAL TO ENT-OTOLARYNGOLOGY        Order labs. Flu vaccine given. Refer to ENT  Advised to  Metoprolol RF at [facility].  FU with Cardiology.

## 2022-11-29 ENCOUNTER — LAB ONLY (OUTPATIENT)
Dept: FAMILY MEDICINE CLINIC | Age: 57
End: 2022-11-29

## 2022-11-29 ENCOUNTER — OFFICE VISIT (OUTPATIENT)
Dept: FAMILY MEDICINE CLINIC | Age: 57
End: 2022-11-29
Payer: COMMERCIAL

## 2022-11-29 VITALS
WEIGHT: 169 LBS | DIASTOLIC BLOOD PRESSURE: 78 MMHG | BODY MASS INDEX: 31.1 KG/M2 | OXYGEN SATURATION: 95 % | TEMPERATURE: 97.7 F | RESPIRATION RATE: 20 BRPM | HEIGHT: 62 IN | SYSTOLIC BLOOD PRESSURE: 150 MMHG | HEART RATE: 106 BPM

## 2022-11-29 DIAGNOSIS — E78.00 HYPERCHOLESTEROLEMIA: ICD-10-CM

## 2022-11-29 DIAGNOSIS — I25.10 CORONARY ARTERY DISEASE INVOLVING NATIVE CORONARY ARTERY OF NATIVE HEART WITHOUT ANGINA PECTORIS: ICD-10-CM

## 2022-11-29 DIAGNOSIS — E11.21 TYPE 2 DIABETES WITH NEPHROPATHY (HCC): ICD-10-CM

## 2022-11-29 DIAGNOSIS — Z79.899 ENCOUNTER FOR LONG-TERM CURRENT USE OF MEDICATION: ICD-10-CM

## 2022-11-29 DIAGNOSIS — H93.8X1 EAR PRESSURE, RIGHT: ICD-10-CM

## 2022-11-29 DIAGNOSIS — I10 ESSENTIAL HYPERTENSION: Primary | ICD-10-CM

## 2022-11-29 DIAGNOSIS — Z23 NEEDS FLU SHOT: ICD-10-CM

## 2022-11-29 DIAGNOSIS — E11.69 TYPE 2 DIABETES MELLITUS WITH OTHER SPECIFIED COMPLICATION, WITHOUT LONG-TERM CURRENT USE OF INSULIN (HCC): ICD-10-CM

## 2022-11-29 DIAGNOSIS — I25.10 CORONARY ARTERY DISEASE INVOLVING NATIVE CORONARY ARTERY OF NATIVE HEART, UNSPECIFIED WHETHER ANGINA PRESENT: ICD-10-CM

## 2022-11-29 LAB
AVERAGE GLUCOSE: NORMAL
HBA1C MFR BLD: 6.9 %

## 2022-11-29 PROCEDURE — 3074F SYST BP LT 130 MM HG: CPT | Performed by: FAMILY MEDICINE

## 2022-11-29 PROCEDURE — 90686 IIV4 VACC NO PRSV 0.5 ML IM: CPT | Performed by: FAMILY MEDICINE

## 2022-11-29 PROCEDURE — 99214 OFFICE O/P EST MOD 30 MIN: CPT | Performed by: FAMILY MEDICINE

## 2022-11-29 PROCEDURE — 90471 IMMUNIZATION ADMIN: CPT | Performed by: FAMILY MEDICINE

## 2022-11-29 PROCEDURE — 3051F HG A1C>EQUAL 7.0%<8.0%: CPT | Performed by: FAMILY MEDICINE

## 2022-11-29 PROCEDURE — 3078F DIAST BP <80 MM HG: CPT | Performed by: FAMILY MEDICINE

## 2022-11-29 RX ORDER — ROSUVASTATIN CALCIUM 20 MG/1
20 TABLET, COATED ORAL
Qty: 90 TABLET | Refills: 3 | Status: SHIPPED | OUTPATIENT
Start: 2022-11-29

## 2022-11-29 RX ORDER — ISOSORBIDE MONONITRATE 30 MG/1
30 TABLET, EXTENDED RELEASE ORAL DAILY
Qty: 90 TABLET | Refills: 3 | Status: SHIPPED | OUTPATIENT
Start: 2022-11-29

## 2022-11-29 RX ORDER — METFORMIN HYDROCHLORIDE 500 MG/1
TABLET ORAL
Qty: 360 TABLET | Refills: 3 | Status: SHIPPED | OUTPATIENT
Start: 2022-11-29

## 2022-11-29 RX ORDER — LOSARTAN POTASSIUM 100 MG/1
100 TABLET ORAL DAILY
Qty: 90 TABLET | Refills: 3 | Status: SHIPPED | OUTPATIENT
Start: 2022-11-29

## 2022-11-29 RX ORDER — GLIPIZIDE 5 MG/1
TABLET, FILM COATED, EXTENDED RELEASE ORAL
Qty: 360 TABLET | Refills: 3 | Status: SHIPPED | OUTPATIENT
Start: 2022-11-29

## 2022-11-29 RX ORDER — HYDROCHLOROTHIAZIDE 25 MG/1
TABLET ORAL
Qty: 90 TABLET | Refills: 3 | Status: SHIPPED | OUTPATIENT
Start: 2022-11-29

## 2022-11-29 NOTE — Clinical Note
Susana, this pt has been out of his Metoprolol x 2 weeks. He claims to have called your office. Unable to get an appt to see you. I offered to send med refill but he wants to hear from your office.

## 2022-11-29 NOTE — PROGRESS NOTES
Identified pt with two pt identifiers(name and ). Chief Complaint   Patient presents with    Diabetes     Follow up    Ear Fullness     Feels like fluid in ear and thinks tube may have fallen out        Health Maintenance Due   Topic    Hepatitis B Vaccine (1 of 3 - Risk 3-dose series)    Shingrix Vaccine Age 50> (1 of 2)    COVID-19 Vaccine (3 - Booster for Moderna series)    Flu Vaccine (1)    MICROALBUMIN Q1        Wt Readings from Last 3 Encounters:   22 169 lb (76.7 kg)   22 171 lb 9.6 oz (77.8 kg)   03/10/22 173 lb (78.5 kg)     Temp Readings from Last 3 Encounters:   22 97.7 °F (36.5 °C) (Temporal)   22 98.6 °F (37 °C) (Oral)   21 97.6 °F (36.4 °C) (Temporal)     BP Readings from Last 3 Encounters:   22 (!) 150/78   03/15/22 125/68   22 (!) 142/92     Pulse Readings from Last 3 Encounters:   22 (!) 106   03/15/22 81   22 83         Learning Assessment:  :     Learning Assessment 2019   PRIMARY LEARNER Patient Patient   HIGHEST LEVEL OF EDUCATION - PRIMARY LEARNER  GRADUATED HIGH SCHOOL OR GED GRADUATED HIGH SCHOOL OR GED   BARRIERS PRIMARY LEARNER NONE NONE   CO-LEARNER CAREGIVER No No   PRIMARY LANGUAGE ENGLISH ENGLISH   LEARNER PREFERENCE PRIMARY LISTENING READING     DEMONSTRATION DEMONSTRATION   ANSWERED BY patient self   RELATIONSHIP SELF SELF       Depression Screening:  :     3 most recent PHQ Screens 2022   Little interest or pleasure in doing things Not at all   Feeling down, depressed, irritable, or hopeless Not at all   Total Score PHQ 2 0       Fall Risk Assessment:  :     No flowsheet data found. Abuse Screening:  :     Abuse Screening Questionnaire 2022 3/11/2022 2021 2021 2020 2019   Do you ever feel afraid of your partner? N N N N N N   Are you in a relationship with someone who physically or mentally threatens you? N N N N N N   Is it safe for you to go home?  Daryle Large Coordination of Care Questionnaire:  :     1) Have you been to an emergency room, urgent care clinic since your last visit? no   Hospitalized since your last visit? no             2) Have you seen or consulted any other health care providers outside of [address] [place] [address] since your last visit? no Kidney biopsy March 15th [facility]  (Include any pap smears or colon screenings in this section.)    3) Do you have an Advance Directive on file? no  Are you interested in receiving information about Advance Directives? no    Patient is accompanied by self I have received verbal consent from Johanna Corrales to discuss any/all medical information while they are present in the room. 4.  For patients aged 39-70: Has the patient had a colonoscopy / FIT/ Cologuard? Yes - no Care Gap present      If the patient is female:    5. For patients aged 41-77: Has the patient had a mammogram within the past 2 years? NA - based on age or sex      10. For patients aged 21-65: Has the patient had a pap smear?  NA - based on age or sex

## 2022-11-29 NOTE — PATIENT INSTRUCTIONS
Vaccine Information Statement    Influenza (Flu) Vaccine (Inactivated or Recombinant): What You Need to Know    Many vaccine information statements are available in [place] and other languages. See www.immunize.org/vis. Hojas de información sobre vacunas están disponibles en español y en muchos otros idiomas. Visite www.immunize.org/vis. 1. Why get vaccinated? Influenza vaccine can prevent influenza (flu). Flu is a contagious disease that spreads around the [facility] every year, usually between October and May. Anyone can get the flu, but it is more dangerous for some people. Infants and young children, people 72 years and older, pregnant people, and people with certain health conditions or a weakened immune system are at greatest risk of flu complications. Pneumonia, bronchitis, sinus infections, and ear infections are examples of flu-related complications. If you have a medical condition, such as heart disease, cancer, or diabetes, flu can make it worse. Flu can cause fever and chills, sore throat, muscle aches, fatigue, cough, headache, and runny or stuffy nose. Some people may have vomiting and diarrhea, though this is more common in children than adults. In an average year, thousands of people in the [facility] die from flu, and many more are hospitalized. Flu vaccine prevents millions of illnesses and flu-related visits to the doctor each year. 2. Influenza vaccines     CDC recommends everyone 6 months and older get vaccinated every flu season. Children 6 months through 6years of age may need 2 doses during a single flu season. Everyone else needs only 1 dose each flu season. It takes about 2 weeks for protection to develop after vaccination. There are many flu viruses, and they are always changing. Each year a new flu vaccine is made to protect against the influenza viruses believed to be likely to cause disease in the upcoming flu season.  Even when the vaccine doesnt exactly match these viruses, it may still provide some protection. Influenza vaccine does not cause flu. Influenza vaccine may be given at the same time as other vaccines. 3. Talk with your health care provider    Tell your vaccination provider if the person getting the vaccine:  Has had an allergic reaction after a previous dose of influenza vaccine, or has any severe, life-threatening allergies   Has ever had Guillain-Barré Syndrome (also called GBS)    In some cases, your health care provider may decide to postpone influenza vaccination until a future visit. Influenza vaccine can be administered at any time during pregnancy. People who are or will be pregnant during influenza season should receive inactivated influenza vaccine. People with minor illnesses, such as a cold, may be vaccinated. People who are moderately or severely ill should usually wait until they recover before getting influenza vaccine. Your health care provider can give you more information. 4. Risks of a vaccine reaction    Soreness, redness, and swelling where the shot is given, fever, muscle aches, and headache can happen after influenza vaccination. There may be a very small increased risk of Guillain-Barré Syndrome (GBS) after inactivated influenza vaccine (the flu shot). José Miguel Au children who get the flu shot along with pneumococcal vaccine (PCV13) and/or DTaP vaccine at the same time might be slightly more likely to have a seizure caused by fever. Tell your health care provider if a child who is getting flu vaccine has ever had a seizure. People sometimes faint after medical procedures, including vaccination. Tell your provider if you feel dizzy or have vision changes or ringing in the ears. As with any medicine, there is a very remote chance of a vaccine causing a severe allergic reaction, other serious injury, or death. 5. What if there is a serious problem?     An allergic reaction could occur after the vaccinated person leaves the clinic. If you see signs of a severe allergic reaction (hives, swelling of the face and throat, difficulty breathing, a fast heartbeat, dizziness, or weakness), call 9-1-1 and get the person to the nearest hospital.    For other signs that concern you, call your health care provider. Adverse reactions should be reported to the Vaccine Adverse Event Reporting System (VAERS). Your health care provider will usually file this report, or you can do it yourself. Visit the VAERS website at www.vaers. [facility].gov or call 3-384.238.1615. VAERS is only for reporting reactions, and VAERS staff members do not give medical advice. 6. The National Vaccine Injury Compensation Program    The [facility] Vaccine Injury Compensation Program (VICP) is a federal program that was created to compensate people who may have been injured by certain vaccines. Claims regarding alleged injury or death due to vaccination have a time limit for filing, which may be as short as two years. Visit the VICP website at www.[facility].gov/vaccinecompensation or call 9-497.600.9135 to learn about the program and about filing a claim. 7. How can I learn more? Ask your health care provider. Call your local or state health department. Visit the website of the Food and Drug Administration (FDA) for vaccine package inserts and additional information at www.fda.gov/vaccines-blood-biologics/vaccines. Contact the Centers for Disease Control and Prevention (CDC): Call 0-364.228.7995 (0-732-RMY-INFO) or  Visit CDCs influenza website at www.cdc.gov/flu. Vaccine Information Statement   Inactivated Influenza Vaccine   8/6/2021  42 OSMANI Reyes [id number]   Department of Health and Human Services  Centers for Disease Control and Prevention    Office Use Only

## 2022-11-30 LAB
ALBUMIN SERPL-MCNC: 4.2 G/DL (ref 3.5–5)
ALBUMIN/GLOB SERPL: 1.2 {RATIO} (ref 1.1–2.2)
ALP SERPL-CCNC: 73 U/L (ref 45–117)
ALT SERPL-CCNC: 45 U/L (ref 12–78)
ANION GAP SERPL CALC-SCNC: 9 MMOL/L (ref 5–15)
AST SERPL-CCNC: 26 U/L (ref 15–37)
BASOPHILS # BLD: 0 K/UL (ref 0–0.1)
BASOPHILS NFR BLD: 1 % (ref 0–1)
BILIRUB SERPL-MCNC: 1.1 MG/DL (ref 0.2–1)
BUN SERPL-MCNC: 23 MG/DL (ref 6–20)
BUN/CREAT SERPL: 21 (ref 12–20)
CALCIUM SERPL-MCNC: 9.9 MG/DL (ref 8.5–10.1)
CHLORIDE SERPL-SCNC: 101 MMOL/L (ref 97–108)
CHOLEST SERPL-MCNC: 151 MG/DL
CO2 SERPL-SCNC: 25 MMOL/L (ref 21–32)
CREAT SERPL-MCNC: 1.09 MG/DL (ref 0.7–1.3)
CREAT UR-MCNC: 58.4 MG/DL
DIFFERENTIAL METHOD BLD: ABNORMAL
EOSINOPHIL # BLD: 0.1 K/UL (ref 0–0.4)
EOSINOPHIL NFR BLD: 3 % (ref 0–7)
ERYTHROCYTE [DISTWIDTH] IN BLOOD BY AUTOMATED COUNT: 13.7 % (ref 11.5–14.5)
EST. AVERAGE GLUCOSE BLD GHB EST-MCNC: 151 MG/DL
GLOBULIN SER CALC-MCNC: 3.4 G/DL (ref 2–4)
GLUCOSE SERPL-MCNC: 233 MG/DL (ref 65–100)
HBA1C MFR BLD: 6.9 % (ref 4–5.6)
HCT VFR BLD AUTO: 41.3 % (ref 36.6–50.3)
HDLC SERPL-MCNC: 62 MG/DL
HDLC SERPL: 2.4 {RATIO} (ref 0–5)
HGB BLD-MCNC: 13.4 G/DL (ref 12.1–17)
IMM GRANULOCYTES # BLD AUTO: 0 K/UL (ref 0–0.04)
IMM GRANULOCYTES NFR BLD AUTO: 0 % (ref 0–0.5)
LDLC SERPL CALC-MCNC: 39.8 MG/DL (ref 0–100)
LYMPHOCYTES # BLD: 0.7 K/UL (ref 0.8–3.5)
LYMPHOCYTES NFR BLD: 18 % (ref 12–49)
MCH RBC QN AUTO: 31.7 PG (ref 26–34)
MCHC RBC AUTO-ENTMCNC: 32.4 G/DL (ref 30–36.5)
MCV RBC AUTO: 97.6 FL (ref 80–99)
MICROALBUMIN UR-MCNC: 124 MG/DL
MICROALBUMIN/CREAT UR-RTO: 2123 MG/G (ref 0–30)
MONOCYTES # BLD: 0.5 K/UL (ref 0–1)
MONOCYTES NFR BLD: 12 % (ref 5–13)
NEUTS SEG # BLD: 2.6 K/UL (ref 1.8–8)
NEUTS SEG NFR BLD: 66 % (ref 32–75)
NRBC # BLD: 0 K/UL (ref 0–0.01)
NRBC BLD-RTO: 0 PER 100 WBC
PLATELET # BLD AUTO: 132 K/UL (ref 150–400)
PMV BLD AUTO: 12 FL (ref 8.9–12.9)
POTASSIUM SERPL-SCNC: 4.6 MMOL/L (ref 3.5–5.1)
PROT SERPL-MCNC: 7.6 G/DL (ref 6.4–8.2)
RBC # BLD AUTO: 4.23 M/UL (ref 4.1–5.7)
RBC MORPH BLD: ABNORMAL
SODIUM SERPL-SCNC: 135 MMOL/L (ref 136–145)
TRIGL SERPL-MCNC: 246 MG/DL (ref ?–150)
VLDLC SERPL CALC-MCNC: 49.2 MG/DL
WBC # BLD AUTO: 3.9 K/UL (ref 4.1–11.1)

## 2022-12-04 NOTE — PROGRESS NOTES
Please call pt. He does not check My Chart messages. High triglycerides but this goes along with high sugar level. A1C is in good range. Persistent high urine micro albumin/ creatinine ratio. Make sure to follow up with Nephrology. Fax copy of theses labs to Nephrology Clinic. Good blood cell counts. Normal kidney and liver function. Follow up with Cardiology.

## 2022-12-05 ENCOUNTER — TELEPHONE (OUTPATIENT)
Dept: FAMILY MEDICINE CLINIC | Age: 57
End: 2022-12-05

## 2022-12-05 NOTE — TELEPHONE ENCOUNTER
----- Message from Karen Ramsay MD sent at 12/4/2022  6:24 PM EST -----  Please call pt. He does not check My Chart messages. High triglycerides but this goes along with high sugar level. A1C is in good range. Persistent high urine micro albumin/ creatinine ratio. Make sure to follow up with Nephrology. Fax copy of theses labs to Nephrology Clinic. Good blood cell counts. Normal kidney and liver function. Follow up with Cardiology.

## 2022-12-05 NOTE — TELEPHONE ENCOUNTER
Called patient to make sure he has seen in Mychart and he has. Faxing over results to providers office.

## 2022-12-08 ENCOUNTER — OFFICE VISIT (OUTPATIENT)
Dept: CARDIOLOGY CLINIC | Age: 57
End: 2022-12-08
Payer: COMMERCIAL

## 2022-12-08 VITALS
OXYGEN SATURATION: 98 % | SYSTOLIC BLOOD PRESSURE: 120 MMHG | HEIGHT: 62 IN | DIASTOLIC BLOOD PRESSURE: 70 MMHG | WEIGHT: 162 LBS | BODY MASS INDEX: 29.81 KG/M2 | HEART RATE: 86 BPM

## 2022-12-08 DIAGNOSIS — I10 ESSENTIAL HYPERTENSION: ICD-10-CM

## 2022-12-08 DIAGNOSIS — E78.00 HYPERCHOLESTEROLEMIA: ICD-10-CM

## 2022-12-08 DIAGNOSIS — R06.09 DOE (DYSPNEA ON EXERTION): ICD-10-CM

## 2022-12-08 DIAGNOSIS — Z95.1 HX OF CABG: ICD-10-CM

## 2022-12-08 DIAGNOSIS — I25.118 CORONARY ARTERY DISEASE OF NATIVE ARTERY OF NATIVE HEART WITH STABLE ANGINA PECTORIS (HCC): Primary | ICD-10-CM

## 2022-12-08 RX ORDER — METOPROLOL SUCCINATE 200 MG/1
100 TABLET, EXTENDED RELEASE ORAL 2 TIMES DAILY
Qty: 90 TABLET | Refills: 3 | Status: SHIPPED | OUTPATIENT
Start: 2022-12-08

## 2022-12-08 NOTE — PROGRESS NOTES
Chief Complaint   Patient presents with    Follow-up     8 months    Hypertension    Cholesterol Problem    Coronary Artery Disease     Vitals:    12/08/22 1458   BP: 120/70   BP 1 Location: Left upper arm   BP Patient Position: Sitting   Pulse: 86   Height: 5' 2\" (1.575 m)   Weight: 162 lb (73.5 kg)   SpO2: 98%       Chest pain denied     SOB denied     Palpitations denied     Swelling in hands/feet denied     Dizziness denied     Recent hospital stays denied     Refills denied

## 2022-12-08 NOTE — PROGRESS NOTES
Florentino Dobson MD. [address] [facility]              Patient: Brigid Sherman  : 1965      Today's Date: 2022            HISTORY OF PRESENT ILLNESS:     History of Present Illness:  No CP or sig SOB. Able to rake leaves a few hours. Some mild chest \"discomfort\" walking up hills. PAST MEDICAL HISTORY:     Past Medical History:   Diagnosis Date    CAD (coronary artery disease)     Diabetes ([address]     Hx of CABG     Aug 2013 at [facility] in [facility]     Hypercholesterolemia     Hypertension          Past Surgical History:   Procedure Laterality Date    HX CORONARY ARTERY BYPASS GRAFT  2013    HX HEART CATHETERIZATION  2019    HX HEENT Left     eye vitrectomy    HX VITRECTOMY Left     [place] CABG, ARTERY-VEIN, THREE             MEDICATIONS:     Current Outpatient Medications   Medication Sig Dispense Refill    metFORMIN (GLUCOPHAGE) 500 mg tablet TAKE 2 TABLETS BY MOUTH TWICE A DAY WITH MORNING AND EVENING MEALS 360 Tablet 3    rosuvastatin (CRESTOR) 20 mg tablet Take 1 Tablet by mouth nightly. 90 Tablet 3    glipiZIDE SR (GLUCOTROL XL) 5 mg CR tablet TAKE 2 TABLETS BY MOUTH TWICE A DAY WITH BREAKFAST AND DINNER FOR TYPE 2 DIABETES MELLITUS 360 Tablet 3    hydroCHLOROthiazide (HYDRODIURIL) 25 mg tablet TAKE 1 TABLET BY MOUTH EVERY DAY FOR BLOOD PRESSURE 90 Tablet 3    isosorbide mononitrate ER (IMDUR) 30 mg tablet Take 1 Tablet by mouth daily. 90 Tablet 3    losartan (COZAAR) 100 mg tablet Take 1 Tablet by mouth daily. 90 Tablet 3    metoprolol succinate (TOPROL-XL) 200 mg XL tablet Take 0.5 Tablets by mouth two (2) times a day. Appointment needed at [facility] for further refills. 30 Tablet 0    spironolactone (ALDACTONE) 25 mg tablet TAKE 1/2 TABLET BY MOUTH EVERY DAY 45 Tablet 3    aspirin delayed-release 81 mg tablet Take 1 Tablet by mouth daily. 90 Tablet 3    dapagliflozin (FARXIGA) 5 mg tab tablet Take 5 mg by mouth daily.       amLODIPine (NORVASC) 5 mg tablet Take 1 Tablet by mouth once over twenty-four (24) hours. 90 Tablet 3    multivitamin (ONE A DAY) tablet Take 1 Tab by mouth daily. Allergies   Allergen Reactions    Lisinopril Cough             SOCIAL HISTORY:     Social History     Tobacco Use    Smoking status: Never    Smokeless tobacco: Never   Vaping Use    Vaping Use: Never used   Substance Use Topics    Alcohol use: Yes     Alcohol/week: 20.0 standard drinks     Types: 20 Shots of liquor per week     Comment: whisky     Drug use: Never         FAMILY HISTORY:     Family History   Problem Relation Age of Onset    No Known Problems Mother     Diabetes Father     Diabetes Sister             REVIEW OF SYMPTOMS:      Review of Symptoms:  Constitutional: Negative for fever, chills  HEENT: Negative for nosebleeds, tinnitus, and vision changes. Respiratory: Negative for cough, wheezing  Cardiovascular: Negative for orthopnea, claudication, syncope, and PND. Gastrointestinal: Negative for abdominal pain, diarrhea, melena. Genitourinary: Negative for dysuria  Musculoskeletal: Negative for myalgias. Skin: Negative for rash  Heme: No problems bleeding. Neurological: Negative for speech change and focal weakness. PHYSICAL EXAM:      Physical Exam:  Visit Vitals  /70 (BP 1 Location: Left upper arm, BP Patient Position: Sitting)   Pulse 86   Ht 5' 2\" (1.575 m)   Wt 162 lb (73.5 kg)   SpO2 98%   BMI 29.63 kg/m²       Patient appears generally well, mood and affect are appropriate and pleasant. HEENT:  Hearing intact, non-icteric, normocephalic, atraumatic. Neck Exam: Supple, No JVD    Lung Exam: Clear to auscultation, even breath sounds. Cardiac Exam: Regular rate and rhythm with no murmur or rub  Abdomen: Soft, non-tender, normal bowel sounds. Extremities: Moves all ext well. Mild bilat lower extremity edema.   Psych: Appropriate affect  Neuro - Grossly intact        LABS / OTHER STUDIES:      Lab Results   Component Value Date/Time    Sodium 135 (L) 11/29/2022 09:43 AM    Potassium 4.6 11/29/2022 09:43 AM    Chloride 101 11/29/2022 09:43 AM    CO2 25 11/29/2022 09:43 AM    Anion gap 9 11/29/2022 09:43 AM    Glucose 233 (H) 11/29/2022 09:43 AM    BUN 23 (H) 11/29/2022 09:43 AM    Creatinine 1.09 11/29/2022 09:43 AM    BUN/Creatinine ratio 21 (H) 11/29/2022 09:43 AM    GFR est AA >60 09/14/2021 09:18 AM    GFR est non-AA >60 09/14/2021 09:18 AM    Calcium 9.9 11/29/2022 09:43 AM    Bilirubin, total 1.1 (H) 11/29/2022 09:43 AM    Alk. phosphatase 73 11/29/2022 09:43 AM    Protein, total 7.6 11/29/2022 09:43 AM    Albumin 4.2 11/29/2022 09:43 AM    Globulin 3.4 11/29/2022 09:43 AM    A-G Ratio 1.2 11/29/2022 09:43 AM    ALT (SGPT) 45 11/29/2022 09:43 AM    AST (SGOT) 26 11/29/2022 09:43 AM       Lab Results   Component Value Date/Time    WBC 3.9 (L) 11/29/2022 09:43 AM    HGB 13.4 11/29/2022 09:43 AM    HCT 41.3 11/29/2022 09:43 AM    PLATELET 942 (L) 95/12/4640 09:43 AM    MCV 97.6 11/29/2022 09:43 AM       Lab Results   Component Value Date/Time    Cholesterol, total 151 11/29/2022 09:43 AM    HDL Cholesterol 62 11/29/2022 09:43 AM    LDL, calculated 39.8 11/29/2022 09:43 AM    VLDL, calculated 49.2 11/29/2022 09:43 AM    Triglyceride 246 (H) 11/29/2022 09:43 AM    CHOL/HDL Ratio 2.4 11/29/2022 09:43 AM       Lab Results   Component Value Date/Time    TSH 1.94 09/14/2021 09:18 AM                    CARDIAC DIAGNOSTICS:      Cardiac Evaluation Includes:     Exercise cardiolite 5/1/19 - walked 8:30, no ischemic EKG changes, normal MPI. Stress EF 48%     Cardiac Cath 5/31/19 - LAD occluded. LIMA to LAD patent. Occluded SVG to Diag. Occluded SVG to OM. Distal PLB disease (small vessel).  LVEF 55%  ---> medical management recommended    Echo 4/27/21 - LVEF 65%    Echo 3/10/22 - mild LVH, LVEF 55-60%, grade 1 diastology       EKG 1/14/20 - NSR, normal   EKG 3/11/21 - NSR, normal   EKG 3/10/22 - NSR, normal            ASSESSMENT AND PLAN:      Assessment and Plan: 1) CAD / CABG and chronic stable angina   - He has history of CABG in 2013. He saw Dr. Randy Preston in [facility] in 2019 - says he had a nuclear stress test and then a cath - had some small vessel blockages and treated medically. - Echo 3/10/22 - mild LVH, LVEF 55-60%, grade 1 diastology   - had chronic class 2 angina (pain walking up hills or exerting himself) and class 2 RAMIREZ  - Continue BB, Imdur, statin, ASA, CCB    2) Mild LE edema    - denies orthopnea or CHF  - Edema possibly due to amlodipine ---> better at a lower dose   - he also admits to excessive etoh and soda use which he knows to cut back on     2) HTN   - continue meds   - keep  log of BP at home  - Gooal < 130/80      3) Dyslipidemia  - Cont Crestor 20 mg daily   - prior lipids OK      4) Etoh   - admits drinking too much -- discussed cutting back   - says he drinks 1 pint a whisky a day since pandemic (also drinks a lot of soda)     5) DM  - per PCP     6) See me in 6 months with an echo (RAMIREZ)   Moved from [place] to [facility] in [facility]. Retired from [employer] building 2019. Family in [facility]. Single. Jaclyn Ramirez MD, [facility] [place] [address]  [address], [address]      [place] [address]  [address]  [place], [address] [place] [address]  Ph: [phone]                               Ph [phone]

## 2023-05-11 DIAGNOSIS — Z95.1 PRESENCE OF AORTOCORONARY BYPASS GRAFT: ICD-10-CM

## 2023-05-11 DIAGNOSIS — R06.09 OTHER FORMS OF DYSPNEA: Primary | ICD-10-CM

## 2023-06-06 RX ORDER — SPIRONOLACTONE 25 MG/1
TABLET ORAL
Qty: 45 TABLET | Refills: 1 | Status: SHIPPED | OUTPATIENT
Start: 2023-06-06

## 2023-07-06 LAB — HBA1C MFR BLD HPLC: 12.9 %

## 2023-10-26 RX ORDER — AMLODIPINE BESYLATE 5 MG/1
5 TABLET ORAL DAILY
Qty: 90 TABLET | Refills: 3 | Status: SHIPPED | OUTPATIENT
Start: 2023-10-26

## 2023-11-02 PROBLEM — E11.21 DIABETIC GLOMERULOPATHY (HCC): Status: ACTIVE | Noted: 2023-11-02

## 2023-11-02 SDOH — ECONOMIC STABILITY: FOOD INSECURITY: WITHIN THE PAST 12 MONTHS, YOU WORRIED THAT YOUR FOOD WOULD RUN OUT BEFORE YOU GOT MONEY TO BUY MORE.: NEVER TRUE

## 2023-11-02 SDOH — ECONOMIC STABILITY: FOOD INSECURITY: WITHIN THE PAST 12 MONTHS, THE FOOD YOU BOUGHT JUST DIDN'T LAST AND YOU DIDN'T HAVE MONEY TO GET MORE.: NEVER TRUE

## 2023-11-02 SDOH — ECONOMIC STABILITY: INCOME INSECURITY: HOW HARD IS IT FOR YOU TO PAY FOR THE VERY BASICS LIKE FOOD, HOUSING, MEDICAL CARE, AND HEATING?: NOT HARD AT ALL

## 2023-11-02 SDOH — ECONOMIC STABILITY: HOUSING INSECURITY
IN THE LAST 12 MONTHS, WAS THERE A TIME WHEN YOU DID NOT HAVE A STEADY PLACE TO SLEEP OR SLEPT IN A SHELTER (INCLUDING NOW)?: NO

## 2023-11-02 SDOH — ECONOMIC STABILITY: TRANSPORTATION INSECURITY
IN THE PAST 12 MONTHS, HAS LACK OF TRANSPORTATION KEPT YOU FROM MEETINGS, WORK, OR FROM GETTING THINGS NEEDED FOR DAILY LIVING?: NO

## 2023-11-02 NOTE — PROGRESS NOTES
Chilango Sauer (:  1965) is a 59 y.o. male,Established patient, here for evaluation of the following chief complaint(s):  Diabetes and Ear Problem (Would like to see if his tube has fallen out of his ear/It feels clogged up at times)        ASSESSMENT/PLAN:  1. Type 2 diabetes with nephropathy (HCC)  -     Hemoglobin A1C; Future  -     Microalbumin / Creatinine Urine Ratio; Future  -     metFORMIN (GLUCOPHAGE) 500 MG tablet; TAKE 2 TABLETS BY MOUTH TWICE A DAY WITH MORNING AND EVENING MEALS, Disp-360 tablet, R-3Normal  -      DIABETES FOOT EXAM  2. Hypercholesterolemia  -     Lipid Panel; Future  -     rosuvastatin (CRESTOR) 20 MG tablet; Take 1 tablet by mouth nightly, Disp-90 tablet, R-3Normal  3. Coronary artery disease involving native coronary artery of native heart, unspecified whether angina present  -     isosorbide mononitrate (IMDUR) 30 MG extended release tablet; Take 1 tablet by mouth daily, Disp-90 tablet, R-3Normal  4. Primary hypertension  -     TSH; Future  -     spironolactone (ALDACTONE) 25 MG tablet; Take 0.5 tablets by mouth daily, Disp-45 tablet, R-3Normal  -     hydroCHLOROthiazide (HYDRODIURIL) 25 MG tablet; Take 1 tablet by mouth daily for blood pressure, Disp-90 tablet, R-3Normal  -     losartan (COZAAR) 100 MG tablet; Take 1 tablet by mouth daily, Disp-90 tablet, R-3Normal  5. Encounter for long-term current use of medication  -     CBC with Auto Differential; Future  -     Comprehensive Metabolic Panel; Future  -     Magnesium; Future  6. Diabetic glomerulopathy ([address])  7. Screening PSA (prostate specific antigen)  -     PSA, Diagnostic; Future  8. Needs flu shot  -     Influenza, FLUCELVAX, (age 10 mo+), IM, Preservative Free, 0.5 mL  9. Type 2 diabetes mellitus with other specified complication (HCC)  -     glipiZIDE (GLUCOTROL XL) 5 MG extended release tablet; Take 2 tablets by mouth 2 times daily, Disp-360 tablet, R-1Normal  10.  Pure hypercholesterolemia, unspecified  -

## 2023-11-03 ENCOUNTER — OFFICE VISIT (OUTPATIENT)
Age: 58
End: 2023-11-03
Payer: COMMERCIAL

## 2023-11-03 VITALS
OXYGEN SATURATION: 94 % | TEMPERATURE: 98.6 F | DIASTOLIC BLOOD PRESSURE: 70 MMHG | WEIGHT: 171 LBS | HEIGHT: 62 IN | HEART RATE: 84 BPM | RESPIRATION RATE: 16 BRPM | SYSTOLIC BLOOD PRESSURE: 124 MMHG | BODY MASS INDEX: 31.47 KG/M2

## 2023-11-03 DIAGNOSIS — I25.10 CORONARY ARTERY DISEASE INVOLVING NATIVE CORONARY ARTERY OF NATIVE HEART, UNSPECIFIED WHETHER ANGINA PRESENT: ICD-10-CM

## 2023-11-03 DIAGNOSIS — I10 PRIMARY HYPERTENSION: ICD-10-CM

## 2023-11-03 DIAGNOSIS — Z23 NEEDS FLU SHOT: ICD-10-CM

## 2023-11-03 DIAGNOSIS — R20.0 NUMBNESS AND TINGLING IN BOTH HANDS: ICD-10-CM

## 2023-11-03 DIAGNOSIS — Z79.899 ENCOUNTER FOR LONG-TERM CURRENT USE OF MEDICATION: ICD-10-CM

## 2023-11-03 DIAGNOSIS — E78.00 PURE HYPERCHOLESTEROLEMIA, UNSPECIFIED: ICD-10-CM

## 2023-11-03 DIAGNOSIS — R20.2 NUMBNESS AND TINGLING IN BOTH HANDS: ICD-10-CM

## 2023-11-03 DIAGNOSIS — E78.00 HYPERCHOLESTEROLEMIA: ICD-10-CM

## 2023-11-03 DIAGNOSIS — E11.21 DIABETIC GLOMERULOPATHY (HCC): ICD-10-CM

## 2023-11-03 DIAGNOSIS — E11.21 TYPE 2 DIABETES WITH NEPHROPATHY (HCC): Primary | ICD-10-CM

## 2023-11-03 DIAGNOSIS — Z12.5 SCREENING PSA (PROSTATE SPECIFIC ANTIGEN): ICD-10-CM

## 2023-11-03 DIAGNOSIS — F10.10 ALCOHOL ABUSE, DAILY USE: ICD-10-CM

## 2023-11-03 PROCEDURE — 3074F SYST BP LT 130 MM HG: CPT | Performed by: FAMILY MEDICINE

## 2023-11-03 PROCEDURE — 99214 OFFICE O/P EST MOD 30 MIN: CPT | Performed by: FAMILY MEDICINE

## 2023-11-03 PROCEDURE — 90674 CCIIV4 VAC NO PRSV 0.5 ML IM: CPT | Performed by: FAMILY MEDICINE

## 2023-11-03 PROCEDURE — 90471 IMMUNIZATION ADMIN: CPT | Performed by: FAMILY MEDICINE

## 2023-11-03 PROCEDURE — 3078F DIAST BP <80 MM HG: CPT | Performed by: FAMILY MEDICINE

## 2023-11-03 RX ORDER — GLIPIZIDE 5 MG/1
10 TABLET, FILM COATED, EXTENDED RELEASE ORAL 2 TIMES DAILY
Qty: 360 TABLET | Refills: 1 | Status: SHIPPED | OUTPATIENT
Start: 2023-11-03

## 2023-11-03 RX ORDER — ROSUVASTATIN CALCIUM 20 MG/1
20 TABLET, COATED ORAL NIGHTLY
Qty: 90 TABLET | Refills: 3 | Status: SHIPPED | OUTPATIENT
Start: 2023-11-03

## 2023-11-03 RX ORDER — LOSARTAN POTASSIUM 100 MG/1
100 TABLET ORAL DAILY
Qty: 90 TABLET | Refills: 3 | Status: SHIPPED | OUTPATIENT
Start: 2023-11-03

## 2023-11-03 RX ORDER — HYDROCHLOROTHIAZIDE 25 MG/1
25 TABLET ORAL DAILY
Qty: 90 TABLET | Refills: 3 | Status: SHIPPED | OUTPATIENT
Start: 2023-11-03

## 2023-11-03 RX ORDER — ISOSORBIDE MONONITRATE 30 MG/1
30 TABLET, EXTENDED RELEASE ORAL DAILY
Qty: 90 TABLET | Refills: 3 | Status: SHIPPED | OUTPATIENT
Start: 2023-11-03

## 2023-11-03 RX ORDER — SPIRONOLACTONE 25 MG/1
12.5 TABLET ORAL DAILY
Qty: 45 TABLET | Refills: 3 | Status: SHIPPED | OUTPATIENT
Start: 2023-11-03

## 2023-11-03 ASSESSMENT — ENCOUNTER SYMPTOMS: RESPIRATORY NEGATIVE: 1

## 2023-11-06 ENCOUNTER — TELEPHONE (OUTPATIENT)
Age: 58
End: 2023-11-06

## 2023-11-06 NOTE — TELEPHONE ENCOUNTER
----- Message from Kelsey Alarcon MD sent at 11/3/2023 10:16 PM EDT -----  Please request eye exam done at [facility]. Dr Vielka Verma. 2023. Thanks.

## 2023-11-08 ENCOUNTER — NURSE ONLY (OUTPATIENT)
Age: 58
End: 2023-11-08

## 2023-11-08 DIAGNOSIS — I10 PRIMARY HYPERTENSION: ICD-10-CM

## 2023-11-08 DIAGNOSIS — Z12.5 SCREENING PSA (PROSTATE SPECIFIC ANTIGEN): ICD-10-CM

## 2023-11-08 DIAGNOSIS — E78.00 HYPERCHOLESTEROLEMIA: ICD-10-CM

## 2023-11-08 DIAGNOSIS — E11.21 TYPE 2 DIABETES WITH NEPHROPATHY (HCC): ICD-10-CM

## 2023-11-08 DIAGNOSIS — Z79.899 ENCOUNTER FOR LONG-TERM CURRENT USE OF MEDICATION: ICD-10-CM

## 2023-11-09 LAB
ALBUMIN SERPL-MCNC: 4 G/DL (ref 3.5–5)
ALBUMIN/GLOB SERPL: 1.3 (ref 1.1–2.2)
ALP SERPL-CCNC: 62 U/L (ref 45–117)
ALT SERPL-CCNC: 34 U/L (ref 12–78)
ANION GAP SERPL CALC-SCNC: 6 MMOL/L (ref 5–15)
AST SERPL-CCNC: 21 U/L (ref 15–37)
BASOPHILS # BLD: 0 K/UL (ref 0–0.1)
BASOPHILS NFR BLD: 1 % (ref 0–1)
BILIRUB SERPL-MCNC: 0.8 MG/DL (ref 0.2–1)
BUN SERPL-MCNC: 25 MG/DL (ref 6–20)
BUN/CREAT SERPL: 25 (ref 12–20)
CALCIUM SERPL-MCNC: 10.1 MG/DL (ref 8.5–10.1)
CHLORIDE SERPL-SCNC: 106 MMOL/L (ref 97–108)
CHOLEST SERPL-MCNC: 164 MG/DL
CO2 SERPL-SCNC: 26 MMOL/L (ref 21–32)
CREAT SERPL-MCNC: 1.02 MG/DL (ref 0.7–1.3)
CREAT UR-MCNC: 50.8 MG/DL
DIFFERENTIAL METHOD BLD: ABNORMAL
EOSINOPHIL # BLD: 0.1 K/UL (ref 0–0.4)
EOSINOPHIL NFR BLD: 3 % (ref 0–7)
ERYTHROCYTE [DISTWIDTH] IN BLOOD BY AUTOMATED COUNT: 14 % (ref 11.5–14.5)
EST. AVERAGE GLUCOSE BLD GHB EST-MCNC: 140 MG/DL
GLOBULIN SER CALC-MCNC: 3.2 G/DL (ref 2–4)
GLUCOSE SERPL-MCNC: 170 MG/DL (ref 65–100)
HBA1C MFR BLD: 6.5 % (ref 4–5.6)
HCT VFR BLD AUTO: 41.5 % (ref 36.6–50.3)
HDLC SERPL-MCNC: 53 MG/DL
HDLC SERPL: 3.1 (ref 0–5)
HGB BLD-MCNC: 13.6 G/DL (ref 12.1–17)
IMM GRANULOCYTES # BLD AUTO: 0 K/UL (ref 0–0.04)
IMM GRANULOCYTES NFR BLD AUTO: 0 % (ref 0–0.5)
LDLC SERPL CALC-MCNC: 69 MG/DL (ref 0–100)
LYMPHOCYTES # BLD: 1 K/UL (ref 0.8–3.5)
LYMPHOCYTES NFR BLD: 30 % (ref 12–49)
MAGNESIUM SERPL-MCNC: 1.8 MG/DL (ref 1.6–2.4)
MCH RBC QN AUTO: 31.3 PG (ref 26–34)
MCHC RBC AUTO-ENTMCNC: 32.8 G/DL (ref 30–36.5)
MCV RBC AUTO: 95.4 FL (ref 80–99)
MICROALBUMIN UR-MCNC: 99.1 MG/DL
MICROALBUMIN/CREAT UR-RTO: 1951 MG/G (ref 0–30)
MONOCYTES # BLD: 0.5 K/UL (ref 0–1)
MONOCYTES NFR BLD: 14 % (ref 5–13)
NEUTS SEG # BLD: 1.7 K/UL (ref 1.8–8)
NEUTS SEG NFR BLD: 52 % (ref 32–75)
NRBC # BLD: 0 K/UL (ref 0–0.01)
NRBC BLD-RTO: 0 PER 100 WBC
PLATELET # BLD AUTO: 116 K/UL (ref 150–400)
PMV BLD AUTO: 11.8 FL (ref 8.9–12.9)
POTASSIUM SERPL-SCNC: 5.1 MMOL/L (ref 3.5–5.1)
PROT SERPL-MCNC: 7.2 G/DL (ref 6.4–8.2)
PSA SERPL-MCNC: 0.6 NG/ML (ref 0.01–4)
RBC # BLD AUTO: 4.35 M/UL (ref 4.1–5.7)
SODIUM SERPL-SCNC: 138 MMOL/L (ref 136–145)
SPECIMEN HOLD: NORMAL
TRIGL SERPL-MCNC: 210 MG/DL
TSH SERPL DL<=0.05 MIU/L-ACNC: 2.08 UIU/ML (ref 0.36–3.74)
VLDLC SERPL CALC-MCNC: 42 MG/DL
WBC # BLD AUTO: 3.3 K/UL (ref 4.1–11.1)

## 2023-11-12 ENCOUNTER — TELEPHONE (OUTPATIENT)
Age: 58
End: 2023-11-12

## 2023-11-13 NOTE — TELEPHONE ENCOUNTER
Please fax all lab results to [facility] nephrology. Attention Dr. Pisano Reading. Patient will have follow-up appointment with him.

## 2023-11-14 ENCOUNTER — TELEPHONE (OUTPATIENT)
Age: 58
End: 2023-11-14

## 2023-11-14 NOTE — TELEPHONE ENCOUNTER
----- Message from Marquis Maia MD sent at 11/12/2023  7:07 PM EST -----  Labs show stable cholesterol numbers. Triglycerides remain mildly elevated. Normal electrolytes, kidney, and liver function. Continue to have high urine protein. Blood cell counts show low platelets. Normal hemoglobin. Good A1c level indicates good sugar control. Normal thyroid test.  Normal prostate blood test.  Normal magnesium level.

## 2023-11-27 ENCOUNTER — COMMUNITY OUTREACH (OUTPATIENT)
Age: 58
End: 2023-11-27

## 2024-01-15 DIAGNOSIS — E11.69 TYPE 2 DIABETES MELLITUS WITH OTHER SPECIFIED COMPLICATION (HCC): ICD-10-CM

## 2024-01-16 RX ORDER — GLIPIZIDE 5 MG/1
TABLET, FILM COATED, EXTENDED RELEASE ORAL
Qty: 120 TABLET | Refills: 5 | Status: SHIPPED | OUTPATIENT
Start: 2024-01-16

## 2024-02-15 ENCOUNTER — OFFICE VISIT (OUTPATIENT)
Age: 59
End: 2024-02-15
Payer: COMMERCIAL

## 2024-02-15 VITALS
WEIGHT: 169 LBS | HEART RATE: 93 BPM | HEIGHT: 63 IN | OXYGEN SATURATION: 96 % | DIASTOLIC BLOOD PRESSURE: 80 MMHG | SYSTOLIC BLOOD PRESSURE: 134 MMHG | BODY MASS INDEX: 29.95 KG/M2

## 2024-02-15 DIAGNOSIS — I10 PRIMARY HYPERTENSION: ICD-10-CM

## 2024-02-15 DIAGNOSIS — Z95.1 HX OF CABG: ICD-10-CM

## 2024-02-15 DIAGNOSIS — I25.118 CORONARY ARTERY DISEASE OF NATIVE ARTERY OF NATIVE HEART WITH STABLE ANGINA PECTORIS (HCC): Primary | ICD-10-CM

## 2024-02-15 DIAGNOSIS — E11.21 TYPE 2 DIABETES WITH NEPHROPATHY (HCC): ICD-10-CM

## 2024-02-15 PROCEDURE — 3075F SYST BP GE 130 - 139MM HG: CPT | Performed by: SPECIALIST

## 2024-02-15 PROCEDURE — 99214 OFFICE O/P EST MOD 30 MIN: CPT | Performed by: SPECIALIST

## 2024-02-15 PROCEDURE — 3079F DIAST BP 80-89 MM HG: CPT | Performed by: SPECIALIST

## 2024-02-15 NOTE — PROGRESS NOTES
Mayank Rivera is a 58 y.o. male    had concerns including Coronary Artery Disease, Hypertension, and Cholesterol Problem.    Vitals:    02/15/24 1139   BP: 134/80   Site: Left Upper Arm   Position: Sitting   Pulse: 93   SpO2: 96%   Weight: 76.7 kg (169 lb)   Height: 1.6 m (5' 3\")        Chest pain NO

## 2024-02-15 NOTE — PROGRESS NOTES
Noman Paul MD. Merged with [facility]          Patient: Mayank Rivera  : 1965      Today's Date: 2/15/2024        HISTORY OF PRESENT ILLNESS:     History of Present Illness:  Doing well. No chest pain, shortness of breath, palpitations.        PAST MEDICAL HISTORY:     Past Medical History:   Diagnosis Date    CAD (coronary artery disease)     Diabetes (HCC)     Hx of CABG     Aug 2013 at [facility] in [place]     Hypercholesterolemia     Hypertension        Past Surgical History:   Procedure Laterality Date    CABG, ARTERY-VEIN, THREE      CARDIAC CATHETERIZATION  2019    CORONARY ARTERY BYPASS GRAFT  2013    HEENT Left     eye vitrectomy    VITRECTOMY Left              CURRENT MEDICATIONS:    .  Current Outpatient Medications   Medication Sig Dispense Refill    glipiZIDE (GLUCOTROL XL) 5 MG extended release tablet TAKE 2 TABLETS BY MOUTH 2 TIMES DAILY NEED FOLLOW UP VISIT AND LABS. 120 tablet 5    dapagliflozin (FARXIGA) 10 MG tablet Take 1 tablet by mouth every morning      rosuvastatin (CRESTOR) 20 MG tablet Take 1 tablet by mouth nightly 90 tablet 3    spironolactone (ALDACTONE) 25 MG tablet Take 0.5 tablets by mouth daily 45 tablet 3    hydroCHLOROthiazide (HYDRODIURIL) 25 MG tablet Take 1 tablet by mouth daily for blood pressure 90 tablet 3    isosorbide mononitrate (IMDUR) 30 MG extended release tablet Take 1 tablet by mouth daily 90 tablet 3    losartan (COZAAR) 100 MG tablet Take 1 tablet by mouth daily 90 tablet 3    metFORMIN (GLUCOPHAGE) 500 MG tablet TAKE 2 TABLETS BY MOUTH TWICE A DAY WITH MORNING AND EVENING MEALS 360 tablet 3    amLODIPine (NORVASC) 5 MG tablet Take 1 tablet by mouth daily 90 tablet 3    metoprolol succinate (TOPROL XL) 100 MG extended release tablet Take 1 tablet by mouth 2 times daily 180 tablet 3    aspirin 81 MG EC tablet Take 1 tablet by mouth daily       No current facility-administered medications for this visit.       Allergies   Allergen Reactions

## 2024-08-14 DIAGNOSIS — E11.69 TYPE 2 DIABETES MELLITUS WITH OTHER SPECIFIED COMPLICATION (HCC): ICD-10-CM

## 2024-08-15 RX ORDER — METOPROLOL SUCCINATE 100 MG/1
100 TABLET, EXTENDED RELEASE ORAL 2 TIMES DAILY
Qty: 180 TABLET | Refills: 0 | Status: SHIPPED | OUTPATIENT
Start: 2024-08-15

## 2024-08-15 RX ORDER — GLIPIZIDE 5 MG/1
TABLET, FILM COATED, EXTENDED RELEASE ORAL
Qty: 360 TABLET | Refills: 1 | Status: SHIPPED | OUTPATIENT
Start: 2024-08-15

## 2024-10-17 NOTE — PROGRESS NOTES
Primary Cardiologist:  Noman Paul MD. [facility]          Patient: Mayank Rivera  : 1965      Today's Date: 10/21/2024        HISTORY OF PRESENT ILLNESS:     History of Present Illness:  Doing well. No chest pain, shortness of breath, palpitations.        PAST MEDICAL HISTORY:     Past Medical History:   Diagnosis Date    CAD (coronary artery disease)     Diabetes (HCC)     Hx of CABG     Aug 2013 at [facility] in [place]     Hypercholesterolemia     Hypertension        Past Surgical History:   Procedure Laterality Date    CABG, ARTERY-VEIN, THREE      CARDIAC CATHETERIZATION  2019    CORONARY ARTERY BYPASS GRAFT  2013    HEENT Left     eye vitrectomy    VITRECTOMY Left              CURRENT MEDICATIONS:    .  Current Outpatient Medications   Medication Sig Dispense Refill    glipiZIDE (GLUCOTROL XL) 5 MG extended release tablet TAKE 2 TABLETS BY MOUTH 2 TIMES DAILY NEED FOLLOW UP VISIT AND LABS. 360 tablet 1    metoprolol succinate (TOPROL XL) 100 MG extended release tablet Take 1 tablet by mouth 2 times daily 180 tablet 0    dapagliflozin (FARXIGA) 10 MG tablet Take 1 tablet by mouth every morning      rosuvastatin (CRESTOR) 20 MG tablet Take 1 tablet by mouth nightly 90 tablet 3    spironolactone (ALDACTONE) 25 MG tablet Take 0.5 tablets by mouth daily 45 tablet 3    hydroCHLOROthiazide (HYDRODIURIL) 25 MG tablet Take 1 tablet by mouth daily for blood pressure 90 tablet 3    isosorbide mononitrate (IMDUR) 30 MG extended release tablet Take 1 tablet by mouth daily 90 tablet 3    losartan (COZAAR) 100 MG tablet Take 1 tablet by mouth daily 90 tablet 3    metFORMIN (GLUCOPHAGE) 500 MG tablet TAKE 2 TABLETS BY MOUTH TWICE A DAY WITH MORNING AND EVENING MEALS 360 tablet 3    amLODIPine (NORVASC) 5 MG tablet Take 1 tablet by mouth daily 90 tablet 3    aspirin 81 MG EC tablet Take 1 tablet by mouth daily       No current facility-administered medications for this visit.       Allergies

## 2024-10-21 ENCOUNTER — OFFICE VISIT (OUTPATIENT)
Age: 59
End: 2024-10-21
Payer: COMMERCIAL

## 2024-10-21 VITALS
HEART RATE: 89 BPM | WEIGHT: 167 LBS | BODY MASS INDEX: 30.73 KG/M2 | DIASTOLIC BLOOD PRESSURE: 74 MMHG | OXYGEN SATURATION: 96 % | SYSTOLIC BLOOD PRESSURE: 124 MMHG | HEIGHT: 62 IN

## 2024-10-21 DIAGNOSIS — Z95.1 HX OF CABG: ICD-10-CM

## 2024-10-21 DIAGNOSIS — F10.10 ALCOHOL ABUSE, DAILY USE: ICD-10-CM

## 2024-10-21 DIAGNOSIS — I25.83 CORONARY ARTERY DISEASE DUE TO LIPID RICH PLAQUE: Primary | ICD-10-CM

## 2024-10-21 DIAGNOSIS — R60.9 EDEMA, UNSPECIFIED TYPE: ICD-10-CM

## 2024-10-21 DIAGNOSIS — I10 PRIMARY HYPERTENSION: ICD-10-CM

## 2024-10-21 DIAGNOSIS — E78.2 MIXED HYPERLIPIDEMIA: ICD-10-CM

## 2024-10-21 DIAGNOSIS — D69.6 THROMBOCYTOPENIA (HCC): ICD-10-CM

## 2024-10-21 DIAGNOSIS — I25.10 CORONARY ARTERY DISEASE DUE TO LIPID RICH PLAQUE: Primary | ICD-10-CM

## 2024-10-21 DIAGNOSIS — E11.21 TYPE 2 DIABETES WITH NEPHROPATHY (HCC): ICD-10-CM

## 2024-10-21 PROCEDURE — 93000 ELECTROCARDIOGRAM COMPLETE: CPT

## 2024-10-21 PROCEDURE — 99214 OFFICE O/P EST MOD 30 MIN: CPT

## 2024-10-21 PROCEDURE — 3074F SYST BP LT 130 MM HG: CPT

## 2024-10-21 PROCEDURE — 3078F DIAST BP <80 MM HG: CPT

## 2024-10-21 NOTE — PROGRESS NOTES
Chief Complaint   Patient presents with    Coronary Artery Disease    Hypertension     Vitals:    10/21/24 1301   BP: 124/74   Site: Left Upper Arm   Position: Sitting   Pulse: 89   SpO2: 96%   Weight: 75.8 kg (167 lb)   Height: 1.575 m (5' 2\")       Chest pain NO     ER, urgent care, or hospitalized outside of [facility] since your last visit?  NO     Refills NO

## 2024-10-23 DIAGNOSIS — I10 PRIMARY HYPERTENSION: ICD-10-CM

## 2024-10-23 RX ORDER — HYDROCHLOROTHIAZIDE 25 MG/1
25 TABLET ORAL DAILY
Qty: 90 TABLET | Refills: 0 | Status: SHIPPED | OUTPATIENT
Start: 2024-10-23

## 2024-10-26 DIAGNOSIS — I10 PRIMARY HYPERTENSION: ICD-10-CM

## 2024-10-26 DIAGNOSIS — I25.10 CORONARY ARTERY DISEASE INVOLVING NATIVE CORONARY ARTERY OF NATIVE HEART, UNSPECIFIED WHETHER ANGINA PRESENT: ICD-10-CM

## 2024-10-26 DIAGNOSIS — E78.00 PURE HYPERCHOLESTEROLEMIA, UNSPECIFIED: ICD-10-CM

## 2024-10-26 DIAGNOSIS — E78.00 HYPERCHOLESTEROLEMIA: ICD-10-CM

## 2024-10-26 RX ORDER — SPIRONOLACTONE 25 MG/1
12.5 TABLET ORAL DAILY
Qty: 45 TABLET | Refills: 0 | Status: SHIPPED | OUTPATIENT
Start: 2024-10-26

## 2024-10-26 RX ORDER — ISOSORBIDE MONONITRATE 30 MG/1
30 TABLET, EXTENDED RELEASE ORAL DAILY
Qty: 90 TABLET | Refills: 0 | Status: SHIPPED | OUTPATIENT
Start: 2024-10-26

## 2024-10-26 RX ORDER — ROSUVASTATIN CALCIUM 20 MG/1
20 TABLET, COATED ORAL NIGHTLY
Qty: 90 TABLET | Refills: 0 | Status: SHIPPED | OUTPATIENT
Start: 2024-10-26

## 2024-10-28 RX ORDER — METOPROLOL SUCCINATE 100 MG/1
100 TABLET, EXTENDED RELEASE ORAL 2 TIMES DAILY
Qty: 180 TABLET | Refills: 3 | Status: SHIPPED | OUTPATIENT
Start: 2024-10-28

## 2024-10-28 NOTE — TELEPHONE ENCOUNTER
Refill per VO of Dr. Carter  Last appt: 2/15/2024    Future Appointments   Date Time Provider Department Center   11/1/2024 10:30 AM Paige Beltrán MD [facility]   4/28/2025  2:00 PM Noman Carter MD [facility]       Requested Prescriptions     Signed Prescriptions Disp Refills    metoprolol succinate (TOPROL XL) 100 MG extended release tablet 180 tablet 3     Sig: TAKE 1 TABLET BY MOUTH TWICE A DAY     Authorizing Provider: NOMAN CARTER     Ordering User: MYRON RAMIREZ

## 2024-11-09 DIAGNOSIS — I10 PRIMARY HYPERTENSION: ICD-10-CM

## 2024-11-10 RX ORDER — LOSARTAN POTASSIUM 100 MG/1
100 TABLET ORAL DAILY
Qty: 90 TABLET | Refills: 0 | Status: SHIPPED | OUTPATIENT
Start: 2024-11-10

## 2024-11-26 ENCOUNTER — OFFICE VISIT (OUTPATIENT)
Age: 59
End: 2024-11-26

## 2024-11-26 VITALS
HEIGHT: 62 IN | OXYGEN SATURATION: 98 % | SYSTOLIC BLOOD PRESSURE: 138 MMHG | BODY MASS INDEX: 31.28 KG/M2 | TEMPERATURE: 97.9 F | RESPIRATION RATE: 20 BRPM | DIASTOLIC BLOOD PRESSURE: 72 MMHG | WEIGHT: 170 LBS | HEART RATE: 86 BPM

## 2024-11-26 DIAGNOSIS — Z12.5 SCREENING PSA (PROSTATE SPECIFIC ANTIGEN): ICD-10-CM

## 2024-11-26 DIAGNOSIS — Z23 NEED FOR PROPHYLACTIC VACCINATION AGAINST STREPTOCOCCUS PNEUMONIAE (PNEUMOCOCCUS): ICD-10-CM

## 2024-11-26 DIAGNOSIS — I10 PRIMARY HYPERTENSION: ICD-10-CM

## 2024-11-26 DIAGNOSIS — E11.21 TYPE 2 DIABETES WITH NEPHROPATHY (HCC): ICD-10-CM

## 2024-11-26 DIAGNOSIS — E78.00 HYPERCHOLESTEROLEMIA: ICD-10-CM

## 2024-11-26 DIAGNOSIS — E78.00 PURE HYPERCHOLESTEROLEMIA, UNSPECIFIED: ICD-10-CM

## 2024-11-26 DIAGNOSIS — N18.2 STAGE 2 CHRONIC KIDNEY DISEASE: ICD-10-CM

## 2024-11-26 DIAGNOSIS — F10.10 ALCOHOL ABUSE, DAILY USE: ICD-10-CM

## 2024-11-26 DIAGNOSIS — I25.10 CORONARY ARTERY DISEASE INVOLVING NATIVE CORONARY ARTERY OF NATIVE HEART, UNSPECIFIED WHETHER ANGINA PRESENT: ICD-10-CM

## 2024-11-26 DIAGNOSIS — Z11.4 SCREENING FOR HIV WITHOUT PRESENCE OF RISK FACTORS: ICD-10-CM

## 2024-11-26 DIAGNOSIS — Z00.00 ENCOUNTER FOR WELL ADULT EXAM WITHOUT ABNORMAL FINDINGS: Primary | ICD-10-CM

## 2024-11-26 DIAGNOSIS — Z23 NEED FOR IMMUNIZATION AGAINST INFLUENZA: ICD-10-CM

## 2024-11-26 RX ORDER — DAPAGLIFLOZIN 10 MG/1
10 TABLET, FILM COATED ORAL EVERY MORNING
Qty: 90 TABLET | Refills: 3 | Status: SHIPPED | OUTPATIENT
Start: 2024-11-26

## 2024-11-26 RX ORDER — SPIRONOLACTONE 25 MG/1
12.5 TABLET ORAL DAILY
Qty: 45 TABLET | Refills: 3 | Status: SHIPPED | OUTPATIENT
Start: 2024-11-26

## 2024-11-26 RX ORDER — LOSARTAN POTASSIUM 100 MG/1
100 TABLET ORAL DAILY
Qty: 90 TABLET | Refills: 3 | Status: SHIPPED | OUTPATIENT
Start: 2024-11-26

## 2024-11-26 RX ORDER — HYDROCHLOROTHIAZIDE 25 MG/1
25 TABLET ORAL DAILY
Qty: 90 TABLET | Refills: 3 | Status: SHIPPED | OUTPATIENT
Start: 2024-11-26

## 2024-11-26 RX ORDER — ROSUVASTATIN CALCIUM 20 MG/1
20 TABLET, COATED ORAL NIGHTLY
Qty: 90 TABLET | Refills: 3 | Status: SHIPPED | OUTPATIENT
Start: 2024-11-26

## 2024-11-26 RX ORDER — ISOSORBIDE MONONITRATE 30 MG/1
30 TABLET, EXTENDED RELEASE ORAL DAILY
Qty: 90 TABLET | Refills: 3 | Status: SHIPPED | OUTPATIENT
Start: 2024-11-26

## 2024-11-26 SDOH — ECONOMIC STABILITY: FOOD INSECURITY: WITHIN THE PAST 12 MONTHS, THE FOOD YOU BOUGHT JUST DIDN'T LAST AND YOU DIDN'T HAVE MONEY TO GET MORE.: NEVER TRUE

## 2024-11-26 SDOH — ECONOMIC STABILITY: FOOD INSECURITY: WITHIN THE PAST 12 MONTHS, YOU WORRIED THAT YOUR FOOD WOULD RUN OUT BEFORE YOU GOT MONEY TO BUY MORE.: NEVER TRUE

## 2024-11-26 SDOH — ECONOMIC STABILITY: INCOME INSECURITY: HOW HARD IS IT FOR YOU TO PAY FOR THE VERY BASICS LIKE FOOD, HOUSING, MEDICAL CARE, AND HEATING?: NOT HARD AT ALL

## 2024-11-26 ASSESSMENT — PATIENT HEALTH QUESTIONNAIRE - PHQ9
9. THOUGHTS THAT YOU WOULD BE BETTER OFF DEAD, OR OF HURTING YOURSELF: NOT AT ALL
7. TROUBLE CONCENTRATING ON THINGS, SUCH AS READING THE NEWSPAPER OR WATCHING TELEVISION: NOT AT ALL
SUM OF ALL RESPONSES TO PHQ9 QUESTIONS 1 & 2: 0
4. FEELING TIRED OR HAVING LITTLE ENERGY: NOT AT ALL
SUM OF ALL RESPONSES TO PHQ QUESTIONS 1-9: 0
2. FEELING DOWN, DEPRESSED OR HOPELESS: NOT AT ALL
5. POOR APPETITE OR OVEREATING: NOT AT ALL
10. IF YOU CHECKED OFF ANY PROBLEMS, HOW DIFFICULT HAVE THESE PROBLEMS MADE IT FOR YOU TO DO YOUR WORK, TAKE CARE OF THINGS AT HOME, OR GET ALONG WITH OTHER PEOPLE: NOT DIFFICULT AT ALL
SUM OF ALL RESPONSES TO PHQ QUESTIONS 1-9: 0
SUM OF ALL RESPONSES TO PHQ QUESTIONS 1-9: 0
3. TROUBLE FALLING OR STAYING ASLEEP: NOT AT ALL
1. LITTLE INTEREST OR PLEASURE IN DOING THINGS: NOT AT ALL
6. FEELING BAD ABOUT YOURSELF - OR THAT YOU ARE A FAILURE OR HAVE LET YOURSELF OR YOUR FAMILY DOWN: NOT AT ALL
8. MOVING OR SPEAKING SO SLOWLY THAT OTHER PEOPLE COULD HAVE NOTICED. OR THE OPPOSITE, BEING SO FIGETY OR RESTLESS THAT YOU HAVE BEEN MOVING AROUND A LOT MORE THAN USUAL: NOT AT ALL
SUM OF ALL RESPONSES TO PHQ QUESTIONS 1-9: 0

## 2024-11-26 NOTE — PROGRESS NOTES
Identified pt with two pt identifiers(name and )    Chief Complaint   Patient presents with    Annual Exam     Patient is here for annual physical. No concerns.        Health Maintenance Due   Topic    HIV screen     Shingles vaccine (1 of 2)    Pneumococcal 0-64 years Vaccine (2 of 2 - PCV)    Depression Screen     Flu vaccine (1)    COVID-19 Vaccine (3 -  season)    Diabetic retinal exam     Diabetic foot exam     A1C test (Diabetic or Prediabetic)     Diabetic Alb to Cr ratio (uACR) test     Lipids     GFR test (Diabetes, CKD 3-4, OR last GFR 15-59)        Wt Readings from Last 3 Encounters:   24 77.1 kg (170 lb)   10/21/24 75.8 kg (167 lb)   02/15/24 76.7 kg (169 lb)     Temp Readings from Last 3 Encounters:   24 97.9 °F (36.6 °C) (Temporal)   23 98.6 °F (37 °C) (Temporal)     BP Readings from Last 3 Encounters:   24 138/72   10/21/24 124/74   02/15/24 134/80     Pulse Readings from Last 3 Encounters:   24 86   10/21/24 89   02/15/24 93           Depression Screening:  :         2024    11:09 AM 6/15/2023     2:07 PM 2022     9:00 AM 2019     3:00 PM   PHQ-9 Questionaire   Little interest or pleasure in doing things 0 0 0 0   Feeling down, depressed, or hopeless 0 0 0 0   Trouble falling or staying asleep, or sleeping too much 0      Feeling tired or having little energy 0      Poor appetite or overeating 0      Feeling bad about yourself - or that you are a failure or have let yourself or your family down 0      Trouble concentrating on things, such as reading the newspaper or watching television 0      Moving or speaking so slowly that other people could have noticed. Or the opposite - being so fidgety or restless that you have been moving around a lot more than usual 0      Thoughts that you would be better off dead, or of hurting yourself in some way 0      PHQ-9 Total Score 0 0 0 0   If you checked off any problems, how difficult have these problems 
(Diabetes, CKD 3-4, OR last GFR 15-59)  11/08/2024      Recommendations for Preventive Services Due: see orders and patient instructions/AVS.

## 2024-12-04 ENCOUNTER — LAB (OUTPATIENT)
Age: 59
End: 2024-12-04

## 2024-12-04 DIAGNOSIS — Z11.4 SCREENING FOR HIV WITHOUT PRESENCE OF RISK FACTORS: ICD-10-CM

## 2024-12-04 DIAGNOSIS — E11.21 TYPE 2 DIABETES WITH NEPHROPATHY (HCC): ICD-10-CM

## 2024-12-04 DIAGNOSIS — Z00.00 ENCOUNTER FOR WELL ADULT EXAM WITHOUT ABNORMAL FINDINGS: ICD-10-CM

## 2024-12-04 DIAGNOSIS — Z12.5 SCREENING PSA (PROSTATE SPECIFIC ANTIGEN): ICD-10-CM

## 2024-12-04 DIAGNOSIS — N18.2 STAGE 2 CHRONIC KIDNEY DISEASE: ICD-10-CM

## 2024-12-06 LAB
25(OH)D3 SERPL-MCNC: 35.7 NG/ML (ref 30–100)
ALBUMIN SERPL-MCNC: 3.9 G/DL (ref 3.5–5)
ALBUMIN/GLOB SERPL: 1.3 (ref 1.1–2.2)
ALP SERPL-CCNC: 76 U/L (ref 45–117)
ALT SERPL-CCNC: 43 U/L (ref 12–78)
ANION GAP SERPL CALC-SCNC: 7 MMOL/L (ref 2–12)
AST SERPL-CCNC: 34 U/L (ref 15–37)
BASOPHILS # BLD: 0 K/UL (ref 0–0.1)
BASOPHILS NFR BLD: 1 % (ref 0–1)
BILIRUB SERPL-MCNC: 1 MG/DL (ref 0.2–1)
BUN SERPL-MCNC: 19 MG/DL (ref 6–20)
BUN/CREAT SERPL: 18 (ref 12–20)
CALCIUM SERPL-MCNC: 10.2 MG/DL (ref 8.5–10.1)
CALCIUM SERPL-MCNC: 9.4 MG/DL (ref 8.5–10.1)
CHLORIDE SERPL-SCNC: 105 MMOL/L (ref 97–108)
CHOLEST SERPL-MCNC: 167 MG/DL
CO2 SERPL-SCNC: 26 MMOL/L (ref 21–32)
CREAT SERPL-MCNC: 1.03 MG/DL (ref 0.7–1.3)
CREAT UR-MCNC: 64.8 MG/DL
CREAT UR-MCNC: 66 MG/DL
DIFFERENTIAL METHOD BLD: ABNORMAL
EOSINOPHIL # BLD: 0.1 K/UL (ref 0–0.4)
EOSINOPHIL NFR BLD: 2 % (ref 0–7)
ERYTHROCYTE [DISTWIDTH] IN BLOOD BY AUTOMATED COUNT: 13.2 % (ref 11.5–14.5)
EST. AVERAGE GLUCOSE BLD GHB EST-MCNC: 137 MG/DL
GLOBULIN SER CALC-MCNC: 3 G/DL (ref 2–4)
GLUCOSE SERPL-MCNC: 220 MG/DL (ref 65–100)
HBA1C MFR BLD: 6.4 % (ref 4–5.6)
HCT VFR BLD AUTO: 41.1 % (ref 36.6–50.3)
HDLC SERPL-MCNC: 65 MG/DL
HDLC SERPL: 2.6 (ref 0–5)
HGB BLD-MCNC: 13.5 G/DL (ref 12.1–17)
HIV 1+2 AB+HIV1 P24 AG SERPL QL IA: NONREACTIVE
HIV 1/2 RESULT COMMENT: NORMAL
IMM GRANULOCYTES # BLD AUTO: 0 K/UL (ref 0–0.04)
IMM GRANULOCYTES NFR BLD AUTO: 0 % (ref 0–0.5)
LDLC SERPL CALC-MCNC: 53 MG/DL (ref 0–100)
LYMPHOCYTES # BLD: 0.9 K/UL (ref 0.8–3.5)
LYMPHOCYTES NFR BLD: 21 % (ref 12–49)
MCH RBC QN AUTO: 31 PG (ref 26–34)
MCHC RBC AUTO-ENTMCNC: 32.8 G/DL (ref 30–36.5)
MCV RBC AUTO: 94.5 FL (ref 80–99)
MICROALBUMIN UR-MCNC: 225 MG/DL
MICROALBUMIN/CREAT UR-RTO: 3409 MG/G (ref 0–30)
MONOCYTES # BLD: 0.5 K/UL (ref 0–1)
MONOCYTES NFR BLD: 12 % (ref 5–13)
NEUTS SEG # BLD: 2.6 K/UL (ref 1.8–8)
NEUTS SEG NFR BLD: 64 % (ref 32–75)
NRBC # BLD: 0 K/UL (ref 0–0.01)
NRBC BLD-RTO: 0 PER 100 WBC
PLATELET # BLD AUTO: 131 K/UL (ref 150–400)
PMV BLD AUTO: 12 FL (ref 8.9–12.9)
POTASSIUM SERPL-SCNC: 4.9 MMOL/L (ref 3.5–5.1)
PROT SERPL-MCNC: 6.9 G/DL (ref 6.4–8.2)
PROT UR-MCNC: 267 MG/DL (ref 0–11.9)
PROT/CREAT UR-RTO: 4.1
PSA SERPL-MCNC: 0.7 NG/ML (ref 0.01–4)
PTH-INTACT SERPL-MCNC: 24.2 PG/ML (ref 18.4–88)
RBC # BLD AUTO: 4.35 M/UL (ref 4.1–5.7)
SODIUM SERPL-SCNC: 138 MMOL/L (ref 136–145)
SPECIMEN HOLD: NORMAL
TRIGL SERPL-MCNC: 245 MG/DL
TSH SERPL DL<=0.05 MIU/L-ACNC: 1.98 UIU/ML (ref 0.36–3.74)
URATE SERPL-MCNC: 6.4 MG/DL (ref 3.5–7.2)
VLDLC SERPL CALC-MCNC: 49 MG/DL
WBC # BLD AUTO: 4.1 K/UL (ref 4.1–11.1)

## 2024-12-08 ENCOUNTER — TELEPHONE (OUTPATIENT)
Age: 59
End: 2024-12-08

## 2024-12-20 ENCOUNTER — APPOINTMENT (OUTPATIENT)
Dept: URBAN - METROPOLITAN AREA CLINIC 204 | Age: 59
Setting detail: DERMATOLOGY
End: 2024-12-20

## 2024-12-20 DIAGNOSIS — L71.8 OTHER ROSACEA: ICD-10-CM

## 2024-12-20 DIAGNOSIS — D22 MELANOCYTIC NEVI: ICD-10-CM

## 2024-12-20 DIAGNOSIS — D18.0 HEMANGIOMA: ICD-10-CM

## 2024-12-20 DIAGNOSIS — L20.89 OTHER ATOPIC DERMATITIS: ICD-10-CM

## 2024-12-20 DIAGNOSIS — L57.8 OTHER SKIN CHANGES DUE TO CHRONIC EXPOSURE TO NONIONIZING RADIATION: ICD-10-CM

## 2024-12-20 DIAGNOSIS — L57.0 ACTINIC KERATOSIS: ICD-10-CM

## 2024-12-20 DIAGNOSIS — L82.1 OTHER SEBORRHEIC KERATOSIS: ICD-10-CM

## 2024-12-20 PROBLEM — D22.5 MELANOCYTIC NEVI OF TRUNK: Status: ACTIVE | Noted: 2024-12-20

## 2024-12-20 PROBLEM — D18.01 HEMANGIOMA OF SKIN AND SUBCUTANEOUS TISSUE: Status: ACTIVE | Noted: 2024-12-20

## 2024-12-20 PROCEDURE — 17000 DESTRUCT PREMALG LESION: CPT

## 2024-12-20 PROCEDURE — 17003 DESTRUCT PREMALG LES 2-14: CPT

## 2024-12-20 PROCEDURE — OTHER PRESCRIPTION MEDICATION MANAGEMENT: OTHER

## 2024-12-20 PROCEDURE — OTHER MIPS QUALITY: OTHER

## 2024-12-20 PROCEDURE — 99204 OFFICE O/P NEW MOD 45 MIN: CPT | Mod: 25

## 2024-12-20 PROCEDURE — OTHER COUNSELING: OTHER

## 2024-12-20 PROCEDURE — OTHER LIQUID NITROGEN: OTHER

## 2024-12-20 ASSESSMENT — LOCATION SIMPLE DESCRIPTION DERM
LOCATION SIMPLE: RIGHT EAR
LOCATION SIMPLE: LEFT UPPER BACK
LOCATION SIMPLE: LEFT FOREARM
LOCATION SIMPLE: LEFT EAR
LOCATION SIMPLE: ABDOMEN
LOCATION SIMPLE: LEFT CHEEK
LOCATION SIMPLE: LEFT FOREHEAD
LOCATION SIMPLE: RIGHT HAND
LOCATION SIMPLE: LEFT HAND
LOCATION SIMPLE: RIGHT CHEEK
LOCATION SIMPLE: POSTERIOR NECK
LOCATION SIMPLE: RIGHT FOREHEAD

## 2024-12-20 ASSESSMENT — LOCATION DETAILED DESCRIPTION DERM
LOCATION DETAILED: LEFT SUPERIOR HELIX
LOCATION DETAILED: RIGHT RADIAL DORSAL HAND
LOCATION DETAILED: LEFT MID-UPPER BACK
LOCATION DETAILED: LEFT LATERAL UPPER BACK
LOCATION DETAILED: RIGHT CENTRAL MALAR CHEEK
LOCATION DETAILED: LEFT INFERIOR FOREHEAD
LOCATION DETAILED: RIGHT INFERIOR FOREHEAD
LOCATION DETAILED: RIGHT MEDIAL FOREHEAD
LOCATION DETAILED: LEFT CENTRAL MALAR CHEEK
LOCATION DETAILED: RIGHT SUPERIOR HELIX
LOCATION DETAILED: LEFT ULNAR DORSAL HAND
LOCATION DETAILED: LEFT PROXIMAL DORSAL FOREARM
LOCATION DETAILED: LEFT LATERAL TRAPEZIAL NECK
LOCATION DETAILED: LEFT LATERAL ABDOMEN

## 2024-12-20 ASSESSMENT — ITCH NUMERIC RATING SCALE: HOW SEVERE IS YOUR ITCHING?: 2

## 2024-12-20 ASSESSMENT — LOCATION ZONE DERM
LOCATION ZONE: ARM
LOCATION ZONE: FACE
LOCATION ZONE: EAR
LOCATION ZONE: HAND
LOCATION ZONE: TRUNK
LOCATION ZONE: NECK

## 2024-12-20 ASSESSMENT — SEVERITY ASSESSMENT OVERALL AMONG ALL PATIENTS
IN YOUR EXPERIENCE, AMONG ALL PATIENTS YOU HAVE SEEN WITH THIS CONDITION, HOW SEVERE IS THIS PATIENT'S CONDITION?: MODERATE

## 2024-12-20 ASSESSMENT — BSA ECZEMA: % BODY COVERED IN ECZEMA: 1

## 2024-12-20 ASSESSMENT — SEVERITY ASSESSMENT 2020: SEVERITY 2020: MODERATE

## 2024-12-20 NOTE — PROCEDURE: PRESCRIPTION MEDICATION MANAGEMENT
Render In Strict Bullet Format?: No
Detail Level: Zone
Initiate Treatment: CeraVe moisturizing cream\\n\\nCeraVe healing ointment

## 2024-12-20 NOTE — PROCEDURE: LIQUID NITROGEN
Consent: The patient's verbal consent was obtained including but not limited to risks of crusting, scabbing, blistering, scarring, darker or lighter pigmentary change, recurrence, incomplete removal and infection.
Number Of Freeze-Thaw Cycles: 2 freeze-thaw cycles
Duration Of Freeze Thaw-Cycle (Seconds): 5
Post-Care Instructions: I reviewed with the patient in detail post-care instructions. Patient is to wear sunprotection, and avoid picking at any of the treated lesions. Pt may apply Vaseline to crusted or scabbing areas.
Application Tool (Optional): Liquid Nitrogen Sprayer
Render Note In Bullet Format When Appropriate: No
Aperture Size (Optional): C
Show Applicator Variable?: Yes
Detail Level: Detailed

## 2025-01-23 DIAGNOSIS — E11.69 TYPE 2 DIABETES MELLITUS WITH OTHER SPECIFIED COMPLICATION (HCC): ICD-10-CM

## 2025-01-23 DIAGNOSIS — I25.10 CORONARY ARTERY DISEASE INVOLVING NATIVE CORONARY ARTERY OF NATIVE HEART, UNSPECIFIED WHETHER ANGINA PRESENT: ICD-10-CM

## 2025-01-23 RX ORDER — ISOSORBIDE MONONITRATE 30 MG/1
30 TABLET, EXTENDED RELEASE ORAL DAILY
Qty: 90 TABLET | Refills: 3 | Status: SHIPPED | OUTPATIENT
Start: 2025-01-23

## 2025-01-23 RX ORDER — GLIPIZIDE 5 MG/1
10 TABLET, FILM COATED, EXTENDED RELEASE ORAL 2 TIMES DAILY
Qty: 360 TABLET | Refills: 1 | Status: SHIPPED | OUTPATIENT
Start: 2025-01-23

## 2025-03-06 ENCOUNTER — TELEPHONE (OUTPATIENT)
Age: 60
End: 2025-03-06

## 2025-03-06 NOTE — TELEPHONE ENCOUNTER
I spoke with Dr Mullen about pt's CKD.  He switched Losartan to Valsartan. He recommends try adding GLP-1 to help kidney protection.  Labs due in June - CMP, Urine albumin/ Cr ratio, Vit D, PTH.    Please call pt. I want to see him to discuss adding new med GLP1 taht Dr Mullen recommends.

## 2025-03-07 NOTE — TELEPHONE ENCOUNTER
I called patient in regards to Dr Beltrán phone call with Dr Mullen. Left message for patient to call office as soon as possible because we would like for him to come in for a visit as soon as possible.

## 2025-03-26 ENCOUNTER — TELEPHONE (OUTPATIENT)
Age: 60
End: 2025-03-26

## 2025-04-09 ENCOUNTER — OFFICE VISIT (OUTPATIENT)
Age: 60
End: 2025-04-09
Payer: COMMERCIAL

## 2025-04-09 VITALS
OXYGEN SATURATION: 96 % | TEMPERATURE: 97.9 F | BODY MASS INDEX: 30.3 KG/M2 | RESPIRATION RATE: 20 BRPM | WEIGHT: 171 LBS | SYSTOLIC BLOOD PRESSURE: 120 MMHG | HEART RATE: 79 BPM | HEIGHT: 63 IN | DIASTOLIC BLOOD PRESSURE: 64 MMHG

## 2025-04-09 DIAGNOSIS — H93.8X1 PRESSURE SENSATION IN RIGHT EAR: ICD-10-CM

## 2025-04-09 DIAGNOSIS — Z23 NEED FOR PNEUMOCOCCAL 20-VALENT CONJUGATE VACCINATION: ICD-10-CM

## 2025-04-09 DIAGNOSIS — E11.21 TYPE 2 DIABETES WITH NEPHROPATHY (HCC): Primary | ICD-10-CM

## 2025-04-09 DIAGNOSIS — I25.10 CORONARY ARTERY DISEASE INVOLVING NATIVE CORONARY ARTERY OF NATIVE HEART, UNSPECIFIED WHETHER ANGINA PRESENT: ICD-10-CM

## 2025-04-09 DIAGNOSIS — Z79.899 ENCOUNTER FOR LONG-TERM CURRENT USE OF MEDICATION: ICD-10-CM

## 2025-04-09 DIAGNOSIS — R07.9 CHEST PAIN, UNSPECIFIED TYPE: ICD-10-CM

## 2025-04-09 PROCEDURE — 90471 IMMUNIZATION ADMIN: CPT | Performed by: FAMILY MEDICINE

## 2025-04-09 PROCEDURE — 90677 PCV20 VACCINE IM: CPT | Performed by: FAMILY MEDICINE

## 2025-04-09 PROCEDURE — 99214 OFFICE O/P EST MOD 30 MIN: CPT | Performed by: FAMILY MEDICINE

## 2025-04-09 PROCEDURE — 3078F DIAST BP <80 MM HG: CPT | Performed by: FAMILY MEDICINE

## 2025-04-09 PROCEDURE — 3074F SYST BP LT 130 MM HG: CPT | Performed by: FAMILY MEDICINE

## 2025-04-09 RX ORDER — SPIRONOLACTONE 25 MG/1
25 TABLET ORAL DAILY
COMMUNITY
End: 2025-04-10 | Stop reason: ALTCHOICE

## 2025-04-09 RX ORDER — IRBESARTAN 300 MG/1
300 TABLET ORAL NIGHTLY
COMMUNITY
Start: 2025-03-06 | End: 2026-03-06

## 2025-04-09 SDOH — ECONOMIC STABILITY: FOOD INSECURITY: WITHIN THE PAST 12 MONTHS, THE FOOD YOU BOUGHT JUST DIDN'T LAST AND YOU DIDN'T HAVE MONEY TO GET MORE.: NEVER TRUE

## 2025-04-09 SDOH — ECONOMIC STABILITY: FOOD INSECURITY: WITHIN THE PAST 12 MONTHS, YOU WORRIED THAT YOUR FOOD WOULD RUN OUT BEFORE YOU GOT MONEY TO BUY MORE.: NEVER TRUE

## 2025-04-09 ASSESSMENT — PATIENT HEALTH QUESTIONNAIRE - PHQ9
1. LITTLE INTEREST OR PLEASURE IN DOING THINGS: NOT AT ALL
7. TROUBLE CONCENTRATING ON THINGS, SUCH AS READING THE NEWSPAPER OR WATCHING TELEVISION: NOT AT ALL
6. FEELING BAD ABOUT YOURSELF - OR THAT YOU ARE A FAILURE OR HAVE LET YOURSELF OR YOUR FAMILY DOWN: NOT AT ALL
5. POOR APPETITE OR OVEREATING: NOT AT ALL
SUM OF ALL RESPONSES TO PHQ QUESTIONS 1-9: 0
8. MOVING OR SPEAKING SO SLOWLY THAT OTHER PEOPLE COULD HAVE NOTICED. OR THE OPPOSITE, BEING SO FIGETY OR RESTLESS THAT YOU HAVE BEEN MOVING AROUND A LOT MORE THAN USUAL: NOT AT ALL
3. TROUBLE FALLING OR STAYING ASLEEP: NOT AT ALL
SUM OF ALL RESPONSES TO PHQ QUESTIONS 1-9: 0
2. FEELING DOWN, DEPRESSED OR HOPELESS: NOT AT ALL
10. IF YOU CHECKED OFF ANY PROBLEMS, HOW DIFFICULT HAVE THESE PROBLEMS MADE IT FOR YOU TO DO YOUR WORK, TAKE CARE OF THINGS AT HOME, OR GET ALONG WITH OTHER PEOPLE: NOT DIFFICULT AT ALL
SUM OF ALL RESPONSES TO PHQ QUESTIONS 1-9: 0
4. FEELING TIRED OR HAVING LITTLE ENERGY: NOT AT ALL
SUM OF ALL RESPONSES TO PHQ QUESTIONS 1-9: 0
9. THOUGHTS THAT YOU WOULD BE BETTER OFF DEAD, OR OF HURTING YOURSELF: NOT AT ALL

## 2025-04-10 NOTE — PROGRESS NOTES
Identified pt with two pt identifiers(name and )    Chief Complaint   Patient presents with    Discuss Medications     Discuss adding medication GLP-1 per Dr Mullen         Health Maintenance Due   Topic    Shingles vaccine (1 of 2)    Pneumococcal 50+ years Vaccine (2 of 2 - PCV)    Respiratory Syncytial Virus (RSV) Pregnant or age 60 yrs+ (1 - Risk 60-74 years 1-dose series)    COVID-19 Vaccine (3 - 2024-25 season)    Diabetic retinal exam     Hepatitis B vaccine (2 of 2 - CpG 2-dose series)    Colorectal Cancer Screen        Wt Readings from Last 3 Encounters:   25 77.6 kg (171 lb)   24 77.1 kg (170 lb)   10/21/24 75.8 kg (167 lb)     Temp Readings from Last 3 Encounters:   25 97.9 °F (36.6 °C) (Oral)   24 97.9 °F (36.6 °C) (Temporal)   23 98.6 °F (37 °C) (Temporal)     BP Readings from Last 3 Encounters:   25 120/64   24 138/72   10/21/24 124/74     Pulse Readings from Last 3 Encounters:   25 79   24 86   10/21/24 89           Depression Screening:  :         2025     1:48 PM 2024    11:09 AM 6/15/2023     2:07 PM 2022     9:00 AM 2019     3:00 PM   PHQ-9 Questionaire   Little interest or pleasure in doing things 0 0 0 0 0   Feeling down, depressed, or hopeless 0 0 0 0 0   Trouble falling or staying asleep, or sleeping too much 0 0      Feeling tired or having little energy 0 0      Poor appetite or overeating 0 0      Feeling bad about yourself - or that you are a failure or have let yourself or your family down 0 0      Trouble concentrating on things, such as reading the newspaper or watching television 0 0      Moving or speaking so slowly that other people could have noticed. Or the opposite - being so fidgety or restless that you have been moving around a lot more than usual 0 0      Thoughts that you would be better off dead, or of hurting yourself in some way 0 0      PHQ-9 Total Score 0 0 0 0 0   If you checked off any 
  Neurological:      Mental Status: He is alert.                 An electronic signature was used to authenticate this note.    --Paige Beltrán MD

## 2025-04-15 ENCOUNTER — APPOINTMENT (OUTPATIENT)
Facility: HOSPITAL | Age: 60
DRG: 282 | End: 2025-04-15
Payer: COMMERCIAL

## 2025-04-15 ENCOUNTER — HOSPITAL ENCOUNTER (INPATIENT)
Facility: HOSPITAL | Age: 60
LOS: 1 days | Discharge: HOME | DRG: 282 | End: 2025-04-16
Attending: EMERGENCY MEDICINE | Admitting: FAMILY MEDICINE
Payer: COMMERCIAL

## 2025-04-15 DIAGNOSIS — I25.10 CORONARY ARTERY DISEASE INVOLVING NATIVE CORONARY ARTERY OF NATIVE HEART, UNSPECIFIED WHETHER ANGINA PRESENT: ICD-10-CM

## 2025-04-15 DIAGNOSIS — R07.9 CHEST PAIN, UNSPECIFIED TYPE: ICD-10-CM

## 2025-04-15 DIAGNOSIS — I20.0 UNSTABLE ANGINA (HCC): Primary | ICD-10-CM

## 2025-04-15 LAB
ALBUMIN SERPL-MCNC: 4 G/DL (ref 3.5–5)
ALBUMIN/GLOB SERPL: 1.1 (ref 1.1–2.2)
ALP SERPL-CCNC: 82 U/L (ref 45–117)
ALT SERPL-CCNC: 31 U/L (ref 12–78)
ANION GAP SERPL CALC-SCNC: 9 MMOL/L (ref 2–12)
APTT PPP: 24.5 SEC (ref 22.1–31)
AST SERPL-CCNC: 16 U/L (ref 15–37)
BASOPHILS # BLD: 0.06 K/UL (ref 0–0.1)
BASOPHILS NFR BLD: 1.1 % (ref 0–1)
BILIRUB SERPL-MCNC: 1 MG/DL (ref 0.2–1)
BUN SERPL-MCNC: 27 MG/DL (ref 6–20)
BUN/CREAT SERPL: 22 (ref 12–20)
CALCIUM SERPL-MCNC: 9.4 MG/DL (ref 8.5–10.1)
CHLORIDE SERPL-SCNC: 105 MMOL/L (ref 97–108)
CHOLEST SERPL-MCNC: 160 MG/DL
CO2 SERPL-SCNC: 20 MMOL/L (ref 21–32)
COMMENT:: NORMAL
CREAT SERPL-MCNC: 1.25 MG/DL (ref 0.7–1.3)
DIFFERENTIAL METHOD BLD: ABNORMAL
ECHO BSA: 1.84 M2
EKG ATRIAL RATE: 105 BPM
EKG ATRIAL RATE: 95 BPM
EKG DIAGNOSIS: NORMAL
EKG DIAGNOSIS: NORMAL
EKG P AXIS: 39 DEGREES
EKG P AXIS: 59 DEGREES
EKG P-R INTERVAL: 140 MS
EKG P-R INTERVAL: 142 MS
EKG Q-T INTERVAL: 368 MS
EKG Q-T INTERVAL: 386 MS
EKG QRS DURATION: 82 MS
EKG QRS DURATION: 86 MS
EKG QTC CALCULATION (BAZETT): 485 MS
EKG QTC CALCULATION (BAZETT): 486 MS
EKG R AXIS: 12 DEGREES
EKG R AXIS: 13 DEGREES
EKG T AXIS: 93 DEGREES
EKG T AXIS: 95 DEGREES
EKG VENTRICULAR RATE: 105 BPM
EKG VENTRICULAR RATE: 95 BPM
EOSINOPHIL # BLD: 0.07 K/UL (ref 0–0.4)
EOSINOPHIL NFR BLD: 1.3 % (ref 0–7)
ERYTHROCYTE [DISTWIDTH] IN BLOOD BY AUTOMATED COUNT: 13.6 % (ref 11.5–14.5)
ERYTHROCYTE [DISTWIDTH] IN BLOOD BY AUTOMATED COUNT: 13.6 % (ref 11.5–14.5)
EST. AVERAGE GLUCOSE BLD GHB EST-MCNC: 134 MG/DL
GLOBULIN SER CALC-MCNC: 3.8 G/DL (ref 2–4)
GLUCOSE BLD STRIP.AUTO-MCNC: 156 MG/DL (ref 65–117)
GLUCOSE BLD STRIP.AUTO-MCNC: 196 MG/DL (ref 65–117)
GLUCOSE BLD STRIP.AUTO-MCNC: 228 MG/DL (ref 65–117)
GLUCOSE BLD STRIP.AUTO-MCNC: 234 MG/DL (ref 65–117)
GLUCOSE BLD STRIP.AUTO-MCNC: 285 MG/DL (ref 65–117)
GLUCOSE SERPL-MCNC: 251 MG/DL (ref 65–100)
HBA1C MFR BLD: 6.3 % (ref 4–5.6)
HCT VFR BLD AUTO: 37.9 % (ref 36.6–50.3)
HCT VFR BLD AUTO: 39.6 % (ref 36.6–50.3)
HDLC SERPL-MCNC: 71 MG/DL
HDLC SERPL: 2.3 (ref 0–5)
HGB BLD-MCNC: 13 G/DL (ref 12.1–17)
HGB BLD-MCNC: 13.4 G/DL (ref 12.1–17)
IMM GRANULOCYTES # BLD AUTO: 0.03 K/UL (ref 0–0.04)
IMM GRANULOCYTES NFR BLD AUTO: 0.6 % (ref 0–0.5)
INR PPP: 1 (ref 0.9–1.1)
LDLC SERPL CALC-MCNC: 60 MG/DL (ref 0–100)
LIPASE SERPL-CCNC: 29 U/L (ref 13–75)
LYMPHOCYTES # BLD: 0.99 K/UL (ref 0.8–3.5)
LYMPHOCYTES NFR BLD: 18.5 % (ref 12–49)
MCH RBC QN AUTO: 31.5 PG (ref 26–34)
MCH RBC QN AUTO: 31.6 PG (ref 26–34)
MCHC RBC AUTO-ENTMCNC: 33.8 G/DL (ref 30–36.5)
MCHC RBC AUTO-ENTMCNC: 34.3 G/DL (ref 30–36.5)
MCV RBC AUTO: 92.2 FL (ref 80–99)
MCV RBC AUTO: 93.2 FL (ref 80–99)
MONOCYTES # BLD: 0.64 K/UL (ref 0–1)
MONOCYTES NFR BLD: 11.9 % (ref 5–13)
NEUTS SEG # BLD: 3.57 K/UL (ref 1.8–8)
NEUTS SEG NFR BLD: 66.6 % (ref 32–75)
NRBC # BLD: 0 K/UL (ref 0–0.01)
NRBC # BLD: 0 K/UL (ref 0–0.01)
NRBC BLD-RTO: 0 PER 100 WBC
NRBC BLD-RTO: 0 PER 100 WBC
NT PRO BNP: 641 PG/ML
PLATELET # BLD AUTO: 125 K/UL (ref 150–400)
PLATELET # BLD AUTO: 132 K/UL (ref 150–400)
PMV BLD AUTO: 10.6 FL (ref 8.9–12.9)
PMV BLD AUTO: 10.7 FL (ref 8.9–12.9)
POTASSIUM SERPL-SCNC: 5.2 MMOL/L (ref 3.5–5.1)
PROT SERPL-MCNC: 7.8 G/DL (ref 6.4–8.2)
PROTHROMBIN TIME: 10.9 SEC (ref 9.2–11.2)
RBC # BLD AUTO: 4.11 M/UL (ref 4.1–5.7)
RBC # BLD AUTO: 4.25 M/UL (ref 4.1–5.7)
SERVICE CMNT-IMP: ABNORMAL
SODIUM SERPL-SCNC: 134 MMOL/L (ref 136–145)
SPECIMEN HOLD: NORMAL
THERAPEUTIC RANGE: NORMAL SECS (ref 58–77)
TRIGL SERPL-MCNC: 145 MG/DL
TROPONIN I SERPL HS-MCNC: 34 NG/L (ref 0–76)
TROPONIN I SERPL HS-MCNC: 63 NG/L (ref 0–76)
TROPONIN I SERPL HS-MCNC: 710 NG/L (ref 0–76)
UFH PPP CHRO-ACNC: 0.26 IU/ML
UFH PPP CHRO-ACNC: <0.1 IU/ML
VLDLC SERPL CALC-MCNC: 29 MG/DL
WBC # BLD AUTO: 5.1 K/UL (ref 4.1–11.1)
WBC # BLD AUTO: 5.4 K/UL (ref 4.1–11.1)

## 2025-04-15 PROCEDURE — 85520 HEPARIN ASSAY: CPT

## 2025-04-15 PROCEDURE — 6370000000 HC RX 637 (ALT 250 FOR IP)

## 2025-04-15 PROCEDURE — 82962 GLUCOSE BLOOD TEST: CPT

## 2025-04-15 PROCEDURE — 93459 L HRT ART/GRFT ANGIO: CPT | Performed by: INTERNAL MEDICINE

## 2025-04-15 PROCEDURE — 6360000002 HC RX W HCPCS

## 2025-04-15 PROCEDURE — 85610 PROTHROMBIN TIME: CPT

## 2025-04-15 PROCEDURE — 2709999900 HC NON-CHARGEABLE SUPPLY: Performed by: INTERNAL MEDICINE

## 2025-04-15 PROCEDURE — 99223 1ST HOSP IP/OBS HIGH 75: CPT | Performed by: SPECIALIST

## 2025-04-15 PROCEDURE — 6360000004 HC RX CONTRAST MEDICATION

## 2025-04-15 PROCEDURE — B2181ZZ FLUOROSCOPY OF LEFT INTERNAL MAMMARY BYPASS GRAFT USING LOW OSMOLAR CONTRAST: ICD-10-PCS | Performed by: INTERNAL MEDICINE

## 2025-04-15 PROCEDURE — 85027 COMPLETE CBC AUTOMATED: CPT

## 2025-04-15 PROCEDURE — C1769 GUIDE WIRE: HCPCS | Performed by: INTERNAL MEDICINE

## 2025-04-15 PROCEDURE — 6360000002 HC RX W HCPCS: Performed by: INTERNAL MEDICINE

## 2025-04-15 PROCEDURE — C1894 INTRO/SHEATH, NON-LASER: HCPCS | Performed by: INTERNAL MEDICINE

## 2025-04-15 PROCEDURE — 83036 HEMOGLOBIN GLYCOSYLATED A1C: CPT

## 2025-04-15 PROCEDURE — 36415 COLL VENOUS BLD VENIPUNCTURE: CPT

## 2025-04-15 PROCEDURE — 93010 ELECTROCARDIOGRAM REPORT: CPT | Performed by: SPECIALIST

## 2025-04-15 PROCEDURE — 99152 MOD SED SAME PHYS/QHP 5/>YRS: CPT | Performed by: INTERNAL MEDICINE

## 2025-04-15 PROCEDURE — 4A023N7 MEASUREMENT OF CARDIAC SAMPLING AND PRESSURE, LEFT HEART, PERCUTANEOUS APPROACH: ICD-10-PCS | Performed by: INTERNAL MEDICINE

## 2025-04-15 PROCEDURE — 6360000004 HC RX CONTRAST MEDICATION: Performed by: INTERNAL MEDICINE

## 2025-04-15 PROCEDURE — 94761 N-INVAS EAR/PLS OXIMETRY MLT: CPT

## 2025-04-15 PROCEDURE — 80053 COMPREHEN METABOLIC PANEL: CPT

## 2025-04-15 PROCEDURE — B2121ZZ FLUOROSCOPY OF SINGLE CORONARY ARTERY BYPASS GRAFT USING LOW OSMOLAR CONTRAST: ICD-10-PCS | Performed by: INTERNAL MEDICINE

## 2025-04-15 PROCEDURE — 93005 ELECTROCARDIOGRAM TRACING: CPT | Performed by: EMERGENCY MEDICINE

## 2025-04-15 PROCEDURE — 71045 X-RAY EXAM CHEST 1 VIEW: CPT

## 2025-04-15 PROCEDURE — 85025 COMPLETE CBC W/AUTO DIFF WBC: CPT

## 2025-04-15 PROCEDURE — 2700000000 HC OXYGEN THERAPY PER DAY

## 2025-04-15 PROCEDURE — 2580000003 HC RX 258

## 2025-04-15 PROCEDURE — 2500000003 HC RX 250 WO HCPCS

## 2025-04-15 PROCEDURE — 83880 ASSAY OF NATRIURETIC PEPTIDE: CPT

## 2025-04-15 PROCEDURE — 84484 ASSAY OF TROPONIN QUANT: CPT

## 2025-04-15 PROCEDURE — 76937 US GUIDE VASCULAR ACCESS: CPT | Performed by: INTERNAL MEDICINE

## 2025-04-15 PROCEDURE — 93005 ELECTROCARDIOGRAM TRACING: CPT

## 2025-04-15 PROCEDURE — 99223 1ST HOSP IP/OBS HIGH 75: CPT | Performed by: STUDENT IN AN ORGANIZED HEALTH CARE EDUCATION/TRAINING PROGRAM

## 2025-04-15 PROCEDURE — 80061 LIPID PANEL: CPT

## 2025-04-15 PROCEDURE — 99285 EMERGENCY DEPT VISIT HI MDM: CPT

## 2025-04-15 PROCEDURE — 85730 THROMBOPLASTIN TIME PARTIAL: CPT

## 2025-04-15 PROCEDURE — 99153 MOD SED SAME PHYS/QHP EA: CPT | Performed by: INTERNAL MEDICINE

## 2025-04-15 PROCEDURE — G0269 OCCLUSIVE DEVICE IN VEIN ART: HCPCS | Performed by: INTERNAL MEDICINE

## 2025-04-15 PROCEDURE — 2060000000 HC ICU INTERMEDIATE R&B

## 2025-04-15 PROCEDURE — 83690 ASSAY OF LIPASE: CPT

## 2025-04-15 PROCEDURE — 6370000000 HC RX 637 (ALT 250 FOR IP): Performed by: EMERGENCY MEDICINE

## 2025-04-15 PROCEDURE — C1760 CLOSURE DEV, VASC: HCPCS | Performed by: INTERNAL MEDICINE

## 2025-04-15 PROCEDURE — B2111ZZ FLUOROSCOPY OF MULTIPLE CORONARY ARTERIES USING LOW OSMOLAR CONTRAST: ICD-10-PCS | Performed by: INTERNAL MEDICINE

## 2025-04-15 PROCEDURE — 71275 CT ANGIOGRAPHY CHEST: CPT

## 2025-04-15 RX ORDER — SODIUM CHLORIDE 9 MG/ML
INJECTION, SOLUTION INTRAVENOUS CONTINUOUS
OUTPATIENT
Start: 2025-04-15 | End: 2025-04-16

## 2025-04-15 RX ORDER — FENTANYL CITRATE 50 UG/ML
INJECTION, SOLUTION INTRAMUSCULAR; INTRAVENOUS PRN
Status: DISCONTINUED | OUTPATIENT
Start: 2025-04-15 | End: 2025-04-15 | Stop reason: HOSPADM

## 2025-04-15 RX ORDER — MORPHINE SULFATE 4 MG/ML
4 INJECTION, SOLUTION INTRAMUSCULAR; INTRAVENOUS
Status: DISCONTINUED | OUTPATIENT
Start: 2025-04-15 | End: 2025-04-15

## 2025-04-15 RX ORDER — HEPARIN SODIUM 200 [USP'U]/100ML
INJECTION, SOLUTION INTRAVENOUS PRN
Status: DISCONTINUED | OUTPATIENT
Start: 2025-04-15 | End: 2025-04-15 | Stop reason: HOSPADM

## 2025-04-15 RX ORDER — FOLIC ACID 5 MG/ML
1 INJECTION, SOLUTION INTRAMUSCULAR; INTRAVENOUS; SUBCUTANEOUS DAILY
Status: DISCONTINUED | OUTPATIENT
Start: 2025-04-15 | End: 2025-04-15

## 2025-04-15 RX ORDER — HEPARIN SODIUM 1000 [USP'U]/ML
4000 INJECTION, SOLUTION INTRAVENOUS; SUBCUTANEOUS PRN
Status: DISCONTINUED | OUTPATIENT
Start: 2025-04-15 | End: 2025-04-15

## 2025-04-15 RX ORDER — DEXTROSE MONOHYDRATE 100 MG/ML
INJECTION, SOLUTION INTRAVENOUS CONTINUOUS PRN
Status: DISCONTINUED | OUTPATIENT
Start: 2025-04-15 | End: 2025-04-16 | Stop reason: HOSPADM

## 2025-04-15 RX ORDER — THIAMINE HYDROCHLORIDE 100 MG/ML
100 INJECTION, SOLUTION INTRAMUSCULAR; INTRAVENOUS DAILY
Status: DISCONTINUED | OUTPATIENT
Start: 2025-04-15 | End: 2025-04-16

## 2025-04-15 RX ORDER — ACETAMINOPHEN 325 MG/1
650 TABLET ORAL EVERY 4 HOURS PRN
Status: DISCONTINUED | OUTPATIENT
Start: 2025-04-15 | End: 2025-04-16 | Stop reason: HOSPADM

## 2025-04-15 RX ORDER — ONDANSETRON 4 MG/1
4 TABLET, ORALLY DISINTEGRATING ORAL EVERY 8 HOURS PRN
Status: DISCONTINUED | OUTPATIENT
Start: 2025-04-15 | End: 2025-04-16 | Stop reason: HOSPADM

## 2025-04-15 RX ORDER — ROSUVASTATIN CALCIUM 10 MG/1
20 TABLET, COATED ORAL NIGHTLY
Status: DISCONTINUED | OUTPATIENT
Start: 2025-04-15 | End: 2025-04-16 | Stop reason: HOSPADM

## 2025-04-15 RX ORDER — ISOSORBIDE MONONITRATE 30 MG/1
30 TABLET, EXTENDED RELEASE ORAL DAILY
Status: DISCONTINUED | OUTPATIENT
Start: 2025-04-15 | End: 2025-04-16

## 2025-04-15 RX ORDER — SODIUM CHLORIDE 9 MG/ML
INJECTION, SOLUTION INTRAVENOUS PRN
Status: DISCONTINUED | OUTPATIENT
Start: 2025-04-15 | End: 2025-04-16 | Stop reason: HOSPADM

## 2025-04-15 RX ORDER — SODIUM CHLORIDE 0.9 % (FLUSH) 0.9 %
5-40 SYRINGE (ML) INJECTION PRN
Status: DISCONTINUED | OUTPATIENT
Start: 2025-04-15 | End: 2025-04-16 | Stop reason: HOSPADM

## 2025-04-15 RX ORDER — LIDOCAINE HYDROCHLORIDE 10 MG/ML
INJECTION, SOLUTION INFILTRATION; PERINEURAL PRN
Status: DISCONTINUED | OUTPATIENT
Start: 2025-04-15 | End: 2025-04-15 | Stop reason: HOSPADM

## 2025-04-15 RX ORDER — AMLODIPINE BESYLATE 5 MG/1
5 TABLET ORAL DAILY
Status: DISCONTINUED | OUTPATIENT
Start: 2025-04-15 | End: 2025-04-16 | Stop reason: HOSPADM

## 2025-04-15 RX ORDER — MORPHINE SULFATE 2 MG/ML
2 INJECTION, SOLUTION INTRAMUSCULAR; INTRAVENOUS EVERY 4 HOURS PRN
Refills: 0 | Status: DISCONTINUED | OUTPATIENT
Start: 2025-04-15 | End: 2025-04-16 | Stop reason: HOSPADM

## 2025-04-15 RX ORDER — ASPIRIN 81 MG/1
81 TABLET ORAL DAILY
Status: DISCONTINUED | OUTPATIENT
Start: 2025-04-16 | End: 2025-04-16 | Stop reason: HOSPADM

## 2025-04-15 RX ORDER — HEPARIN SODIUM 1000 [USP'U]/ML
2000 INJECTION, SOLUTION INTRAVENOUS; SUBCUTANEOUS PRN
Status: DISCONTINUED | OUTPATIENT
Start: 2025-04-15 | End: 2025-04-15

## 2025-04-15 RX ORDER — ACETAMINOPHEN 650 MG/1
650 SUPPOSITORY RECTAL EVERY 6 HOURS PRN
Status: DISCONTINUED | OUTPATIENT
Start: 2025-04-15 | End: 2025-04-16 | Stop reason: HOSPADM

## 2025-04-15 RX ORDER — MULTIVITAMIN WITH IRON
1 TABLET ORAL DAILY
Status: DISCONTINUED | OUTPATIENT
Start: 2025-04-15 | End: 2025-04-16 | Stop reason: HOSPADM

## 2025-04-15 RX ORDER — ONDANSETRON 2 MG/ML
4 INJECTION INTRAMUSCULAR; INTRAVENOUS EVERY 6 HOURS PRN
Status: DISCONTINUED | OUTPATIENT
Start: 2025-04-15 | End: 2025-04-16 | Stop reason: HOSPADM

## 2025-04-15 RX ORDER — SODIUM CHLORIDE 0.9 % (FLUSH) 0.9 %
5-40 SYRINGE (ML) INJECTION EVERY 12 HOURS SCHEDULED
Status: DISCONTINUED | OUTPATIENT
Start: 2025-04-15 | End: 2025-04-16 | Stop reason: HOSPADM

## 2025-04-15 RX ORDER — METOPROLOL SUCCINATE 50 MG/1
100 TABLET, EXTENDED RELEASE ORAL 2 TIMES DAILY
Status: DISCONTINUED | OUTPATIENT
Start: 2025-04-15 | End: 2025-04-16 | Stop reason: HOSPADM

## 2025-04-15 RX ORDER — INSULIN LISPRO 100 [IU]/ML
0-4 INJECTION, SOLUTION INTRAVENOUS; SUBCUTANEOUS EVERY 6 HOURS SCHEDULED
Status: DISCONTINUED | OUTPATIENT
Start: 2025-04-15 | End: 2025-04-16 | Stop reason: HOSPADM

## 2025-04-15 RX ORDER — ENOXAPARIN SODIUM 100 MG/ML
40 INJECTION SUBCUTANEOUS DAILY
Status: CANCELLED | OUTPATIENT
Start: 2025-04-15

## 2025-04-15 RX ORDER — IOPAMIDOL 755 MG/ML
INJECTION, SOLUTION INTRAVASCULAR PRN
Status: DISCONTINUED | OUTPATIENT
Start: 2025-04-15 | End: 2025-04-15 | Stop reason: HOSPADM

## 2025-04-15 RX ORDER — HEPARIN SODIUM 10000 [USP'U]/100ML
5-30 INJECTION, SOLUTION INTRAVENOUS CONTINUOUS
Status: DISCONTINUED | OUTPATIENT
Start: 2025-04-15 | End: 2025-04-15

## 2025-04-15 RX ORDER — HYDROCHLOROTHIAZIDE 25 MG/1
25 TABLET ORAL DAILY
Status: DISCONTINUED | OUTPATIENT
Start: 2025-04-15 | End: 2025-04-16 | Stop reason: HOSPADM

## 2025-04-15 RX ORDER — IOPAMIDOL 755 MG/ML
100 INJECTION, SOLUTION INTRAVASCULAR
Status: COMPLETED | OUTPATIENT
Start: 2025-04-15 | End: 2025-04-15

## 2025-04-15 RX ORDER — MIDAZOLAM HYDROCHLORIDE 1 MG/ML
INJECTION INTRAMUSCULAR; INTRAVENOUS PRN
Status: DISCONTINUED | OUTPATIENT
Start: 2025-04-15 | End: 2025-04-15 | Stop reason: HOSPADM

## 2025-04-15 RX ORDER — HEPARIN SODIUM 1000 [USP'U]/ML
4000 INJECTION, SOLUTION INTRAVENOUS; SUBCUTANEOUS ONCE
Status: COMPLETED | OUTPATIENT
Start: 2025-04-15 | End: 2025-04-15

## 2025-04-15 RX ORDER — ACETAMINOPHEN 325 MG/1
650 TABLET ORAL EVERY 6 HOURS PRN
Status: DISCONTINUED | OUTPATIENT
Start: 2025-04-15 | End: 2025-04-16 | Stop reason: HOSPADM

## 2025-04-15 RX ORDER — POLYETHYLENE GLYCOL 3350 17 G/17G
17 POWDER, FOR SOLUTION ORAL DAILY PRN
Status: DISCONTINUED | OUTPATIENT
Start: 2025-04-15 | End: 2025-04-16 | Stop reason: HOSPADM

## 2025-04-15 RX ORDER — LOSARTAN POTASSIUM 50 MG/1
100 TABLET ORAL DAILY
Status: DISCONTINUED | OUTPATIENT
Start: 2025-04-15 | End: 2025-04-16 | Stop reason: HOSPADM

## 2025-04-15 RX ADMIN — FOLIC ACID 1 MG: 5 INJECTION, SOLUTION INTRAMUSCULAR; INTRAVENOUS; SUBCUTANEOUS at 13:04

## 2025-04-15 RX ADMIN — IOPAMIDOL 70 ML: 755 INJECTION, SOLUTION INTRAVENOUS at 08:43

## 2025-04-15 RX ADMIN — THIAMINE HYDROCHLORIDE 100 MG: 100 INJECTION, SOLUTION INTRAMUSCULAR; INTRAVENOUS at 12:15

## 2025-04-15 RX ADMIN — INSULIN LISPRO 1 UNITS: 100 INJECTION, SOLUTION INTRAVENOUS; SUBCUTANEOUS at 13:33

## 2025-04-15 RX ADMIN — INSULIN LISPRO 1 UNITS: 100 INJECTION, SOLUTION INTRAVENOUS; SUBCUTANEOUS at 09:33

## 2025-04-15 RX ADMIN — SODIUM CHLORIDE, PRESERVATIVE FREE 10 ML: 5 INJECTION INTRAVENOUS at 20:26

## 2025-04-15 RX ADMIN — MORPHINE SULFATE 2 MG: 2 INJECTION, SOLUTION INTRAMUSCULAR; INTRAVENOUS at 20:39

## 2025-04-15 RX ADMIN — MULTIVITAMIN TABLET 1 TABLET: TABLET at 17:47

## 2025-04-15 RX ADMIN — HEPARIN SODIUM 12 UNITS/KG/HR: 10000 INJECTION, SOLUTION INTRAVENOUS at 09:30

## 2025-04-15 RX ADMIN — LOSARTAN POTASSIUM 100 MG: 50 TABLET, FILM COATED ORAL at 09:31

## 2025-04-15 RX ADMIN — ROSUVASTATIN CALCIUM 20 MG: 10 TABLET, FILM COATED ORAL at 20:25

## 2025-04-15 RX ADMIN — ISOSORBIDE MONONITRATE 30 MG: 30 TABLET, EXTENDED RELEASE ORAL at 11:03

## 2025-04-15 RX ADMIN — METOPROLOL SUCCINATE 100 MG: 50 TABLET, EXTENDED RELEASE ORAL at 09:31

## 2025-04-15 RX ADMIN — NITROGLYCERIN 1 INCH: 20 OINTMENT TOPICAL at 06:57

## 2025-04-15 RX ADMIN — METOPROLOL SUCCINATE 100 MG: 50 TABLET, EXTENDED RELEASE ORAL at 20:25

## 2025-04-15 RX ADMIN — HEPARIN SODIUM 4000 UNITS: 1000 INJECTION INTRAVENOUS; SUBCUTANEOUS at 09:27

## 2025-04-15 ASSESSMENT — PAIN SCALES - GENERAL
PAINLEVEL_OUTOF10: 4
PAINLEVEL_OUTOF10: 3

## 2025-04-15 ASSESSMENT — ENCOUNTER SYMPTOMS
ABDOMINAL PAIN: 0
SHORTNESS OF BREATH: 1
SHORTNESS OF BREATH: 0

## 2025-04-15 ASSESSMENT — PAIN DESCRIPTION - LOCATION
LOCATION: CHEST
LOCATION: CHEST

## 2025-04-15 ASSESSMENT — LIFESTYLE VARIABLES
HOW MANY STANDARD DRINKS CONTAINING ALCOHOL DO YOU HAVE ON A TYPICAL DAY: 7 TO 9
HOW OFTEN DO YOU HAVE A DRINK CONTAINING ALCOHOL: 4 OR MORE TIMES A WEEK

## 2025-04-15 ASSESSMENT — PAIN DESCRIPTION - DESCRIPTORS: DESCRIPTORS: ACHING

## 2025-04-15 ASSESSMENT — PAIN DESCRIPTION - ORIENTATION: ORIENTATION: MID

## 2025-04-15 ASSESSMENT — PAIN - FUNCTIONAL ASSESSMENT: PAIN_FUNCTIONAL_ASSESSMENT: NONE - DENIES PAIN

## 2025-04-15 NOTE — PROGRESS NOTES
Noted troponin trend: 34 > 63 > 710. Repeat EKG thus far stable and without notable ischemic changes. Continue trending troponin and EKG q2h. Communicated plan with nursing staff. Informed that patient is now being transported for cardiac cath.     Gia Nelson MD  04/15/25

## 2025-04-15 NOTE — ED PROVIDER NOTES
[facility] INTERMEDIATE CARE UNIT  EMERGENCY DEPARTMENT ENCOUNTER      Pt Name: Mayank Rivera  MRN: [id number]  Birthdate 1965  Date of evaluation: 4/15/2025  Provider: Calixto Muhammad MD      HISTORY OF PRESENT ILLNESS      59-year-old male with history of CAD, CABG, diabetes, hypertension, alcoholism presenting to the ER for chest pain.  Patient reports he has had 2 nights of ongoing intermittent substernal chest pains.  He describes his pain as a heavy pressure.  Denies nausea or vomiting.  He was brought in by EMS.  He has had 2 doses of nitro prior to arrival and 324 aspirin.  Reports some relief of his chest pain after nitro.              Nursing Notes were reviewed.    REVIEW OF SYSTEMS         Review of Systems   Respiratory:  Negative for shortness of breath.    Cardiovascular:  Positive for chest pain.   Gastrointestinal:  Negative for abdominal pain.           PAST MEDICAL HISTORY     Past Medical History:   Diagnosis Date    CAD (coronary artery disease)     Diabetes (HCC)     Hx of CABG     Aug 2013 at [facility] in [place]     Hypercholesterolemia     Hypertension          SURGICAL HISTORY       Past Surgical History:   Procedure Laterality Date    CABG, ARTERY-VEIN, THREE  2013    CARDIAC CATHETERIZATION  05/2019    CARDIAC PROCEDURE N/A 4/15/2025    Left heart cath / coronary angiography w grafts performed by Collin Betancourt MD at [facility] CARDIAC CATH LAB    CORONARY ARTERY BYPASS GRAFT  08/28/2013    HEENT Left 1990's    eye vitrectomy    INVASIVE VASCULAR Right 4/15/2025    Ultrasound guided vascular access performed by Collin Betancourt MD at [facility] CARDIAC CATH LAB    VITRECTOMY Left 1997         CURRENT MEDICATIONS       Discharge Medication List as of 4/16/2025  4:38 PM        CONTINUE these medications which have NOT CHANGED    Details   irbesartan (AVAPRO) 300 MG tablet Take 1 tablet by mouth nightlyHistorical Med      Semaglutide,0.25 or 0.5MG/DOS, 2 MG/3ML SOPN Inject 0.25 mg into the

## 2025-04-15 NOTE — ED PROVIDER NOTES
[facility] Consult for Admission  7:16 AM    ED Room Number: ER06/06  Patient Name and age:  Mayank Rivera 59 y.o.  male  Working Diagnosis:   1. Unstable angina (HCC)        COVID-19 Suspicion: No  Sepsis present:  No  Reassessment needed: No  Code Status:  Full Code  Readmission: No  Isolation Requirements: no  Recommended Level of Care: telemetry  Department: [facility] - [phone]      Other: 59-year-old male with known CAD presents with a couple of days of chest pain required multiple doses of nitroglycerin and aspirin prior to arrival.  Initial troponin negative           Jeremias Montana DO  04/15/25 0720

## 2025-04-15 NOTE — DISCHARGE INSTRUCTIONS
HOME DISCHARGE INSTRUCTIONS    Mayank Rivera / [id number] : 1965    Admission date: 4/15/2025 Discharge date: 2025     Please bring this form with you to show your care provider at your follow-up appointment.    Primary care provider:  Paige Beltrán MD    Discharging provider:  Mahi Finch MD  - Family Medicine Resident  Roshan Crawford MD - Family Medicine Attending      You have been admitted to the hospital with the following diagnoses:    ACUTE DIAGNOSES:  Unstable angina (HCC) [I20.0]    . . . . . . . . . . . . . . . . . . . . . . . . . . . . . . . . . . . . . . . . . . . . . . . . . . . . . . . . . . . . . . . . . . . . . . . .   FOLLOW-UP CARE RECOMMENDATIONS:  Medications:  Start omeprazole              - Increase imdur to 60mg              - Home nitro              - Start Ranexa              - Start Zetia    Appointments  Future Appointments   Date Time Provider Department Center   2025  1:00 PM Neelam Hendricks, APRN - NP [facility]       Follow-up tests needed: none    Pending test results:   At the time of your discharge the following test results are still pending: none.   Please make sure you review these results with your outpatient follow-up provider(s).    DIET/what to eat:  cardiac diet and diabetic diet    ACTIVITY:  activity as tolerated    Wound care: none    Equipment needed:  none    Specific symptoms to watch for: chest pain, shortness of breath, fever, chills, nausea, vomiting, diarrhea, change in mentation, falling, weakness, bleeding.     What to do if new or unexpected symptoms occur?    If you experience any of the above symptoms (or should other concerns or questions arise after discharge) please call your primary care physician. Return to the emergency room if you cannot get hold of your doctor.    It is very important that you keep your follow-up appointment(s).  Please bring discharge papers, medication list (and/or medication bottles) to your

## 2025-04-15 NOTE — H&P
[facility]  [address] [place] [address]   Office [phone], Fax [phone]        Admission H&P     Name: Mayank Rivera MRN: [id number]  Sex: Male   YOB: 1965  Age: 59 y.o.  PCP: Paige Beltrán MD     Source of Information: patient, medical records    Chief complaint: \"chest pain\"    History of Present Illness  Mayank Rivera is a 59 y.o. male with PMH CAD s/p CABG, T2DM, HTN, Alcohol Abuse who presents to the ER complaining of chest pain. Sx started on 4/13 with midline substernal crushing pain, intermittent and initially with movement. Then progressed to at rest this AM and was worsened so he took one tab nitroglycerin. En route to hospital, given 2x nitro and ASA 324mg. CP improved after nitro. At this time endorsing 6/10 chest pain that is radiating down the right arm. Reports similar pain to prior when needed CABG. Feeling increased shortness of breath. No pain elsewhere. Denies recent medication changes other than being prescribed GLP1, not yet started. Of note, anniversary of mother's death upcoming.    In the ER:   Vitals: Temp 98.4   /84      RR 18   SatO2  93% on RA  Labs: Trop 34, , K 5.2, Glucose 251  Imaging: CXR NAP  Treatment: 1 inch nitro paste    EKG: Sinus Tachycardia, rate 105 bpm, no ST elevation    Review of Systems  Review of Systems   Constitutional:  Negative for fever.   Eyes:  Negative for visual disturbance.   Respiratory:  Positive for shortness of breath.    Cardiovascular:  Positive for chest pain. Negative for palpitations and leg swelling.   Gastrointestinal:  Negative for abdominal pain.   Neurological:  Negative for syncope, weakness, numbness and headaches.     Home Medications   Prior to Admission medications    Medication Sig Start Date End Date Taking? Authorizing Provider   irbesartan (AVAPRO) 300 MG tablet Take 1 tablet by mouth nightly 3/6/25 3/6/26  Provider, MD Abdiel      Ambulates  x  Independently      With cane       Assisted walker      Alcohol history     None     Social   x  Chronic     Smoking history  x  None     Former smoker     Current smoker     Drug history  x  None     Former drug user     Current drug user     Code status  x  Full code     DNR/DNI     Partial      Code status discussed with the patient/caregivers.      Physical Exam  General: Alert. Cooperative.   Head: Normocephalic. Atraumatic.   Eyes:              Conjunctiva pink. Sclera white. PERRL.   Throat: Moist mucous membranes.   Respiratory: CTAB. Tachypneic. No w/r/r/c. NC in place 2L.   Cardiovascular: Tachycardic. Regular Rhythm. No m/r/g.    GI: + bowel sounds. Nontender. No rebound/guarding. Non-distended.   Extremities: No LE edema. Distal pulses intact.   Musculoskeletal: Moving all extremities.    Skin: Warm, dry. No rashes.    Neuro: CN II-XII, strength, sensation grossly intact.          Assessment and Plan     Mayank Rivera is a 59 y.o. male with PMH of CAD s/p CABG, T2DM, HTN, Alcohol Abuse who is admitted for unstable angina.     Chest Pain  Unstable Angina: First Troponin 34. . EKG sinus tachycardia, rate 105, no ST elevations. CXR NAP. Heart score 6. Risk Factors: known CAD s/p CABG, HTN, HLD. S/p 2x nitro and 324mg ASA en route to hospital, nitroglycerin paste in the ED. Follows o/p Dr. Paul, missed last appointment. Pain began 2 days ago, initially exertional, now also occurring at rest. Midline, crushing, radiating down right arm, c/w typical chest pain. Currently tachycardic and tachypnea, likely 2/2 to worsening chest pain, ordered morphine. Cardiology consulted.   - Admit to telemetry, vitals per unit routine  - Cardiology consulted in the ED, appreciate assistance  - CTA chest ordered  - Repeat troponin     - Serial EKGs q2h  - S/p ASA load  - Morphine 2mg q4h prn  - Supplemental O2 prn  - Crestor 20mg  - NPO  - Heparin gtt  - A1c, lipids  - Daily CBC/CMP    CAD s/p CABG:  Home metoprolol succinate  mg daily. Follows with Dr. Paul o/p.   - Continue home medication  - Cardiology c/s    T2DM  Neuropathy: Last A1c 6.4 (12/2024). Home Semaglutide 0.25, dapaglifloxin 10 mg daily, Glipizide 5 daily, metformin 500 mg bid   - Hold home medication  - Consider restarting SGLT2i when able to tolerate PO  - Insulin Sliding Scale low sensitivity with q6h glucose checks.  - Hypoglycemia protocols ordered.    CKD2: POA Cr 1.25 (BL1.1). eGFR 66. Follows o/p with [facility], Dr. Mullen.   - Consider restarting SGLT2i when able to tolerate PO  - Monitor on daily labs    HTN: Controlled with home Irvesartan 300 mg daily, HCTZ 25 mg daily, . Of note, recently on losartan and spironolactone but were discontinued by nephrologist.  - Hold home medications  - Monitor trend and restart as able    Alcohol use disorder: Chronic. Daily 1 pint whiskey.  - CIWA protocol  - Thiamine, folate supplements  - Monitor closely for signs of withdrawal    Obesity: BMI of Body mass index is 31.28 kg/m².  - Encouraging lifestyle modifications and further follow up outpatient.    FEN/GI - NPO for proc > Cardiac.   Activity - Ambulate as tolerated  DVT prophylaxis - Heparin gtt  GI prophylaxis - Not indicated at this time  Fall prophylaxis - Not indicated at this time.  Disposition - Admit to Telemetry. Plan to d/c to Home.  Code Status - FULL, discussed with patient / caregivers.  Next of Kin Name and Contact Keyur Rivera (Parent) [phone]     Patient Mayank Rivera will be discussed with Dr. Keshawn Staley MD (Family Medicine Attending).    8:58 AM, 04/15/25  Gia Manolo Nelson MD  Family Medicine Resident       For Billing    Chief Complaint   Patient presents with    Chest Pain       Principal Problem:    Unstable angina (HCC)  Resolved Problems:    * No resolved hospital problems. *

## 2025-04-15 NOTE — CONSULTS
[facility]  Tana Zarate MD 4/15/2025   Cardiology Care Note                  []Initial visit     []Established visit     Patient Name: Mayank Rivera - :1965 - 59 y.o.   Rounding Cardiologist: Tana Zarate MD,[facility]  Primary Cardiologist: Noman Paul MD  Reason for initial consult: [place]  Last EF by echo/MRI: 55 %   Mayank Rivera is a 59 y.o. male   Patient reports chest pain the last 2 nights.  They also occur in the daytime.  Took 5 nitro last night and 2 nitro during the day.  He has not had pain like this since 2019.  History of prior CABG  History of cath 2019 diffuse disease  Significant diabetes  Significant alcohol use  Nitro were old but that he did feel worked  Pain was typically at night while laying down but there was daytime pain also  No recent change in shortness of breath edema no new palpitations or syncope.  Previously has been on beta-blocker Imdur statin aspirin CCB seen in office 10/21/2024 by nurse practitioner longest  He is here with his father and brother-in-law today in the ER  Lab work notable for sodium 134 potassium 5.2 creatinine 1.25 proBNP 641 troponin 34  CT chest shows no PE mild groundglass appearance lower lungs.  No edema chest x-ray no acute process  EKG sinus tachycardia  anterior infarct  Last echo 6/15/2023 EF 61   Assessment and Plan   1.  CAD with unstable angina  Several days of chest pain or overly by nitro  Elevated pulse  Plan cath probably not possible today due to schedule likely tomorrow currently pain-free  We will keep n.p.o. until we make a final decision on timing  Continue heparin  If pain recurs start Nitropaste    2.  Hypertension  -154 resume home meds    3.  Alcohol use  At risk for DTs defer to primary team    4.  Hypercholesterolemia continue high potency statin check LDL if greater than 55 add Zetia, previous

## 2025-04-15 NOTE — PROCEDURES
BRIEF PROCEDURE NOTE    Date of Procedure: 4/15/2025   Preoperative Diagnosis: NSTEMI  Postoperative Diagnosis: Sig native vessel dz; Patent LIMA to LAD ; SVG to Diag/Om ( not visualised- known TO from cath 2019 ) ; RCA - non  dominant    Procedure: Left heart cath, LV angiography, coronary angiography  Interventional Cardiologist: Collin Betancourt MD  Assistant : none  Anesthesia: local + IV sedation  I administered moderate sedation throughout this procedure. An independent trained observer pushed medications at my direction, and monitored the patient’s level of consciousness and physiological status throughout.  Estimated Blood Loss: Minimal    Access:   R CFA - Ultrasound/Fluoroscopic guided micropuncture access - 6 F sheath    Catheters: RCA : JR4                    LCA : JL4    Findings:     L Main: Short/ Med; MLI    LAD: Smll - prox - severe diffuse dz; Mid 100%    LCflex: Dominant; Ostial 40%; Mid diffuse 60-70%; Om1 - diffuse severe dz; Backfilling of occluded graft noted; OM2 - very small; OM3 - small- mod to severe dz     RCA: Non dominant; small ; diffuse severe dz    LIMA to LAD ( JR4) - patent; Native vessel small    SVG to Diag/OM ( not visualised- known TO from cath 2019     LVEDP: Nml    LVEF: Not assessed    No significant gradient across aortic valve.    PCI: none      Specimens Removed : None    Devices implanted : None    Complications: None    Closure Device: R CFA - Mynx        See full cath note.    Complications: none    Collin Betancourt MD

## 2025-04-15 NOTE — PROGRESS NOTES
3:39 PM  TRANSFER - OUT REPORT:    Verbal report given to Janine on Mayank Rivera  being transferred to cath lab for ordered procedure       Report consisted of patient's Situation, Background, Assessment and   Recommendations(SBAR).     Information from the following report(s) Nurse Handoff Report was reviewed with the receiving nurse.           Lines:   Peripheral IV 04/15/25 Left Antecubital (Active)   Site Assessment Clean, dry & intact 04/15/25 1516   Line Status Flushed 04/15/25 1516   Phlebitis Assessment No symptoms 04/15/25 1516   Infiltration Assessment 0 04/15/25 1516   Dressing Status Clean, dry & intact 04/15/25 1516   Dressing Type Transparent 04/15/25 1516       Peripheral IV 04/15/25 Distal;Right Forearm (Active)   Site Assessment Clean, dry & intact 04/15/25 1516   Line Status Flushed 04/15/25 1516   Phlebitis Assessment No symptoms 04/15/25 1516   Infiltration Assessment 0 04/15/25 1516   Dressing Status Clean, dry & intact 04/15/25 1516   Dressing Type Transparent 04/15/25 1516        Opportunity for questions and clarification was provided.      Patient transported with:  Registered Nurse      CLPO and 3rd floor nurse at patient bedside to assess patient and site. Dressing clean, dry, and intact.

## 2025-04-15 NOTE — ED TRIAGE NOTES
Patient arrived via EMS with complaints of chest pain last two days radiating to right arm.      Hx of triple bypass in 2013.  2019 underwent cardiac cath, stents not placed due to location of blockage.    Given two doses of nitro en route and 324mg aspirin.  Glucose 129 per EMS.    Takes amlodipine, hydrochlorothiazide, metoprolol, metformin, lisinopril.    Denies SOB and chest pain.    Drinks a pint of whiskey / day.  Last drink was yesterday afternoon.

## 2025-04-16 ENCOUNTER — APPOINTMENT (OUTPATIENT)
Facility: HOSPITAL | Age: 60
DRG: 282 | End: 2025-04-16
Attending: SPECIALIST
Payer: COMMERCIAL

## 2025-04-16 VITALS
HEART RATE: 84 BPM | TEMPERATURE: 98.4 F | HEIGHT: 62 IN | BODY MASS INDEX: 31.47 KG/M2 | OXYGEN SATURATION: 94 % | SYSTOLIC BLOOD PRESSURE: 121 MMHG | WEIGHT: 171 LBS | RESPIRATION RATE: 18 BRPM | DIASTOLIC BLOOD PRESSURE: 68 MMHG

## 2025-04-16 LAB
ANION GAP SERPL CALC-SCNC: 8 MMOL/L (ref 2–12)
BASOPHILS # BLD: 0.05 K/UL (ref 0–0.1)
BASOPHILS NFR BLD: 1 % (ref 0–1)
BUN SERPL-MCNC: 21 MG/DL (ref 6–20)
BUN/CREAT SERPL: 21 (ref 12–20)
CALCIUM SERPL-MCNC: 9.3 MG/DL (ref 8.5–10.1)
CHLORIDE SERPL-SCNC: 108 MMOL/L (ref 97–108)
CO2 SERPL-SCNC: 22 MMOL/L (ref 21–32)
CREAT SERPL-MCNC: 0.99 MG/DL (ref 0.7–1.3)
DIFFERENTIAL METHOD BLD: ABNORMAL
ECHO AO ASC DIAM: 2.9 CM
ECHO AO ASCENDING AORTA INDEX: 1.62 CM/M2
ECHO AO ROOT DIAM: 3.1 CM
ECHO AO ROOT INDEX: 1.73 CM/M2
ECHO AV AREA PEAK VELOCITY: 2.5 CM2
ECHO AV AREA VTI: 2.7 CM2
ECHO AV AREA/BSA PEAK VELOCITY: 1.4 CM2/M2
ECHO AV AREA/BSA VTI: 1.5 CM2/M2
ECHO AV MEAN GRADIENT: 3 MMHG
ECHO AV MEAN VELOCITY: 0.8 M/S
ECHO AV PEAK GRADIENT: 5 MMHG
ECHO AV PEAK VELOCITY: 1.1 M/S
ECHO AV VELOCITY RATIO: 0.82
ECHO AV VTI: 18.6 CM
ECHO BSA: 1.84 M2
ECHO LA DIAMETER INDEX: 2.01 CM/M2
ECHO LA DIAMETER: 3.6 CM
ECHO LA TO AORTIC ROOT RATIO: 1.16
ECHO LA VOL A-L A2C: 38 ML (ref 18–58)
ECHO LA VOL A-L A4C: 43 ML (ref 18–58)
ECHO LA VOL BP: 40 ML (ref 18–58)
ECHO LA VOL MOD A2C: 39 ML (ref 18–58)
ECHO LA VOL MOD A4C: 41 ML (ref 18–58)
ECHO LA VOL/BSA BIPLANE: 22 ML/M2 (ref 16–34)
ECHO LA VOLUME AREA LENGTH: 41 ML
ECHO LA VOLUME INDEX A-L A2C: 21 ML/M2 (ref 16–34)
ECHO LA VOLUME INDEX A-L A4C: 24 ML/M2 (ref 16–34)
ECHO LA VOLUME INDEX AREA LENGTH: 23 ML/M2 (ref 16–34)
ECHO LA VOLUME INDEX MOD A2C: 22 ML/M2 (ref 16–34)
ECHO LA VOLUME INDEX MOD A4C: 23 ML/M2 (ref 16–34)
ECHO LV E' LATERAL VELOCITY: 6.59 CM/S
ECHO LV E' SEPTAL VELOCITY: 4.76 CM/S
ECHO LV EDV A2C: 64 ML
ECHO LV EDV A4C: 89 ML
ECHO LV EDV BP: 80 ML (ref 67–155)
ECHO LV EDV INDEX A4C: 50 ML/M2
ECHO LV EDV INDEX BP: 45 ML/M2
ECHO LV EDV NDEX A2C: 36 ML/M2
ECHO LV EF PHYSICIAN: 53 %
ECHO LV EJECTION FRACTION A2C: 51 %
ECHO LV EJECTION FRACTION A4C: 52 %
ECHO LV EJECTION FRACTION BIPLANE: 53 % (ref 55–100)
ECHO LV ESV A2C: 31 ML
ECHO LV ESV A4C: 43 ML
ECHO LV ESV BP: 38 ML (ref 22–58)
ECHO LV ESV INDEX A2C: 17 ML/M2
ECHO LV ESV INDEX A4C: 24 ML/M2
ECHO LV ESV INDEX BP: 21 ML/M2
ECHO LV FRACTIONAL SHORTENING: 37 % (ref 28–44)
ECHO LV INTERNAL DIMENSION DIASTOLE INDEX: 2.29 CM/M2
ECHO LV INTERNAL DIMENSION DIASTOLIC: 4.1 CM (ref 4.2–5.9)
ECHO LV INTERNAL DIMENSION SYSTOLIC INDEX: 1.45 CM/M2
ECHO LV INTERNAL DIMENSION SYSTOLIC: 2.6 CM
ECHO LV IVSD: 1.3 CM (ref 0.6–1)
ECHO LV MASS 2D: 182.5 G (ref 88–224)
ECHO LV MASS INDEX 2D: 101.9 G/M2 (ref 49–115)
ECHO LV POSTERIOR WALL DIASTOLIC: 1.2 CM (ref 0.6–1)
ECHO LV RELATIVE WALL THICKNESS RATIO: 0.59
ECHO LVOT AREA: 3.1 CM2
ECHO LVOT AV VTI INDEX: 0.86
ECHO LVOT DIAM: 2 CM
ECHO LVOT MEAN GRADIENT: 2 MMHG
ECHO LVOT PEAK GRADIENT: 3 MMHG
ECHO LVOT PEAK VELOCITY: 0.9 M/S
ECHO LVOT STROKE VOLUME INDEX: 28.1 ML/M2
ECHO LVOT SV: 50.2 ML
ECHO LVOT VTI: 16 CM
ECHO MV A VELOCITY: 0.76 M/S
ECHO MV AREA VTI: 2.8 CM2
ECHO MV E DECELERATION TIME (DT): 158 MS
ECHO MV E VELOCITY: 0.64 M/S
ECHO MV E/A RATIO: 0.84
ECHO MV E/E' LATERAL: 9.71
ECHO MV E/E' RATIO (AVERAGED): 11.58
ECHO MV E/E' SEPTAL: 13.45
ECHO MV LVOT VTI INDEX: 1.13
ECHO MV MAX VELOCITY: 1.1 M/S
ECHO MV MEAN GRADIENT: 1 MMHG
ECHO MV MEAN VELOCITY: 0.6 M/S
ECHO MV PEAK GRADIENT: 5 MMHG
ECHO MV VTI: 18.1 CM
ECHO PV MAX VELOCITY: 1 M/S
ECHO PV PEAK GRADIENT: 4 MMHG
ECHO RV FREE WALL PEAK S': 11.4 CM/S
ECHO RV INTERNAL DIMENSION: 3.9 CM
ECHO RV TAPSE: 1.7 CM (ref 1.7–?)
EOSINOPHIL # BLD: 0.12 K/UL (ref 0–0.4)
EOSINOPHIL NFR BLD: 2.4 % (ref 0–7)
ERYTHROCYTE [DISTWIDTH] IN BLOOD BY AUTOMATED COUNT: 13.9 % (ref 11.5–14.5)
GLUCOSE BLD STRIP.AUTO-MCNC: 163 MG/DL (ref 65–117)
GLUCOSE BLD STRIP.AUTO-MCNC: 199 MG/DL (ref 65–117)
GLUCOSE BLD STRIP.AUTO-MCNC: 360 MG/DL (ref 65–117)
GLUCOSE SERPL-MCNC: 170 MG/DL (ref 65–100)
HCT VFR BLD AUTO: 36.4 % (ref 36.6–50.3)
HGB BLD-MCNC: 12.4 G/DL (ref 12.1–17)
IMM GRANULOCYTES # BLD AUTO: 0.01 K/UL (ref 0–0.04)
IMM GRANULOCYTES NFR BLD AUTO: 0.2 % (ref 0–0.5)
LYMPHOCYTES # BLD: 1.37 K/UL (ref 0.8–3.5)
LYMPHOCYTES NFR BLD: 27.8 % (ref 12–49)
MCH RBC QN AUTO: 32.1 PG (ref 26–34)
MCHC RBC AUTO-ENTMCNC: 34.1 G/DL (ref 30–36.5)
MCV RBC AUTO: 94.3 FL (ref 80–99)
MONOCYTES # BLD: 0.67 K/UL (ref 0–1)
MONOCYTES NFR BLD: 13.6 % (ref 5–13)
NEUTS SEG # BLD: 2.71 K/UL (ref 1.8–8)
NEUTS SEG NFR BLD: 55 % (ref 32–75)
NRBC # BLD: 0 K/UL (ref 0–0.01)
NRBC BLD-RTO: 0 PER 100 WBC
PLATELET # BLD AUTO: 132 K/UL (ref 150–400)
PMV BLD AUTO: 10.4 FL (ref 8.9–12.9)
POTASSIUM SERPL-SCNC: 4.2 MMOL/L (ref 3.5–5.1)
RBC # BLD AUTO: 3.86 M/UL (ref 4.1–5.7)
SERVICE CMNT-IMP: ABNORMAL
SODIUM SERPL-SCNC: 138 MMOL/L (ref 136–145)
WBC # BLD AUTO: 4.9 K/UL (ref 4.1–11.1)

## 2025-04-16 PROCEDURE — 97530 THERAPEUTIC ACTIVITIES: CPT

## 2025-04-16 PROCEDURE — C8929 TTE W OR WO FOL WCON,DOPPLER: HCPCS

## 2025-04-16 PROCEDURE — 97161 PT EVAL LOW COMPLEX 20 MIN: CPT

## 2025-04-16 PROCEDURE — 6370000000 HC RX 637 (ALT 250 FOR IP): Performed by: SPECIALIST

## 2025-04-16 PROCEDURE — 6370000000 HC RX 637 (ALT 250 FOR IP)

## 2025-04-16 PROCEDURE — 82962 GLUCOSE BLOOD TEST: CPT

## 2025-04-16 PROCEDURE — 99233 SBSQ HOSP IP/OBS HIGH 50: CPT | Performed by: SPECIALIST

## 2025-04-16 PROCEDURE — 6360000002 HC RX W HCPCS

## 2025-04-16 PROCEDURE — 2500000003 HC RX 250 WO HCPCS

## 2025-04-16 PROCEDURE — 80048 BASIC METABOLIC PNL TOTAL CA: CPT

## 2025-04-16 PROCEDURE — 99239 HOSP IP/OBS DSCHRG MGMT >30: CPT | Performed by: STUDENT IN AN ORGANIZED HEALTH CARE EDUCATION/TRAINING PROGRAM

## 2025-04-16 PROCEDURE — 85025 COMPLETE CBC W/AUTO DIFF WBC: CPT

## 2025-04-16 PROCEDURE — 94761 N-INVAS EAR/PLS OXIMETRY MLT: CPT

## 2025-04-16 PROCEDURE — 93306 TTE W/DOPPLER COMPLETE: CPT | Performed by: SPECIALIST

## 2025-04-16 PROCEDURE — 6360000004 HC RX CONTRAST MEDICATION: Performed by: SPECIALIST

## 2025-04-16 RX ORDER — GAUZE BANDAGE 2" X 2"
100 BANDAGE TOPICAL DAILY
Status: DISCONTINUED | OUTPATIENT
Start: 2025-04-16 | End: 2025-04-16 | Stop reason: HOSPADM

## 2025-04-16 RX ORDER — RANOLAZINE 500 MG/1
500 TABLET, EXTENDED RELEASE ORAL 2 TIMES DAILY
Status: DISCONTINUED | OUTPATIENT
Start: 2025-04-16 | End: 2025-04-16 | Stop reason: HOSPADM

## 2025-04-16 RX ORDER — PANTOPRAZOLE SODIUM 40 MG/1
40 TABLET, DELAYED RELEASE ORAL
Status: DISCONTINUED | OUTPATIENT
Start: 2025-04-17 | End: 2025-04-16 | Stop reason: HOSPADM

## 2025-04-16 RX ORDER — NITROGLYCERIN 0.3 MG/1
0.3 TABLET SUBLINGUAL
Qty: 30 TABLET | Refills: 0 | Status: SHIPPED | OUTPATIENT
Start: 2025-04-16 | End: 2025-04-24 | Stop reason: ALTCHOICE

## 2025-04-16 RX ORDER — EZETIMIBE 10 MG/1
10 TABLET ORAL DAILY
Qty: 30 TABLET | Refills: 3 | Status: SHIPPED | OUTPATIENT
Start: 2025-04-16 | End: 2025-04-24 | Stop reason: SDUPTHER

## 2025-04-16 RX ORDER — ISOSORBIDE MONONITRATE 60 MG/1
60 TABLET, EXTENDED RELEASE ORAL DAILY
Status: DISCONTINUED | OUTPATIENT
Start: 2025-04-17 | End: 2025-04-16 | Stop reason: HOSPADM

## 2025-04-16 RX ORDER — ISOSORBIDE MONONITRATE 30 MG/1
60 TABLET, EXTENDED RELEASE ORAL DAILY
Qty: 90 TABLET | Refills: 3 | Status: SHIPPED | OUTPATIENT
Start: 2025-04-16 | End: 2025-04-24 | Stop reason: SDUPTHER

## 2025-04-16 RX ORDER — OMEPRAZOLE 20 MG/1
20 CAPSULE, DELAYED RELEASE ORAL
Qty: 30 CAPSULE | Refills: 0 | Status: SHIPPED | OUTPATIENT
Start: 2025-04-16

## 2025-04-16 RX ORDER — RANOLAZINE 500 MG/1
500 TABLET, EXTENDED RELEASE ORAL 2 TIMES DAILY
Qty: 60 TABLET | Refills: 3 | Status: SHIPPED | OUTPATIENT
Start: 2025-04-16 | End: 2025-04-24 | Stop reason: SDUPTHER

## 2025-04-16 RX ORDER — EZETIMIBE 10 MG/1
10 TABLET ORAL NIGHTLY
Status: DISCONTINUED | OUTPATIENT
Start: 2025-04-16 | End: 2025-04-16 | Stop reason: HOSPADM

## 2025-04-16 RX ORDER — ENOXAPARIN SODIUM 100 MG/ML
40 INJECTION SUBCUTANEOUS DAILY
Status: DISCONTINUED | OUTPATIENT
Start: 2025-04-16 | End: 2025-04-16 | Stop reason: HOSPADM

## 2025-04-16 RX ORDER — FOLIC ACID 1 MG/1
1 TABLET ORAL DAILY
Status: DISCONTINUED | OUTPATIENT
Start: 2025-04-16 | End: 2025-04-16 | Stop reason: HOSPADM

## 2025-04-16 RX ADMIN — LOSARTAN POTASSIUM 100 MG: 50 TABLET, FILM COATED ORAL at 07:37

## 2025-04-16 RX ADMIN — ASPIRIN 81 MG: 81 TABLET, COATED ORAL at 07:37

## 2025-04-16 RX ADMIN — INSULIN LISPRO 1 UNITS: 100 INJECTION, SOLUTION INTRAVENOUS; SUBCUTANEOUS at 00:44

## 2025-04-16 RX ADMIN — MULTIVITAMIN TABLET 1 TABLET: TABLET at 07:38

## 2025-04-16 RX ADMIN — ENOXAPARIN SODIUM 40 MG: 100 INJECTION SUBCUTANEOUS at 08:32

## 2025-04-16 RX ADMIN — FOLIC ACID 1 MG: 1 TABLET ORAL at 07:38

## 2025-04-16 RX ADMIN — RANOLAZINE 500 MG: 500 TABLET, FILM COATED, EXTENDED RELEASE ORAL at 11:08

## 2025-04-16 RX ADMIN — SULFUR HEXAFLUORIDE 2 ML: KIT at 12:53

## 2025-04-16 RX ADMIN — MORPHINE SULFATE 2 MG: 2 INJECTION, SOLUTION INTRAMUSCULAR; INTRAVENOUS at 07:37

## 2025-04-16 RX ADMIN — METOPROLOL SUCCINATE 100 MG: 50 TABLET, EXTENDED RELEASE ORAL at 07:37

## 2025-04-16 RX ADMIN — INSULIN LISPRO 4 UNITS: 100 INJECTION, SOLUTION INTRAVENOUS; SUBCUTANEOUS at 11:27

## 2025-04-16 RX ADMIN — MORPHINE SULFATE 2 MG: 2 INJECTION, SOLUTION INTRAMUSCULAR; INTRAVENOUS at 00:44

## 2025-04-16 RX ADMIN — ISOSORBIDE MONONITRATE 30 MG: 30 TABLET, EXTENDED RELEASE ORAL at 07:38

## 2025-04-16 RX ADMIN — SODIUM CHLORIDE, PRESERVATIVE FREE 10 ML: 5 INJECTION INTRAVENOUS at 07:38

## 2025-04-16 RX ADMIN — Medication 100 MG: at 07:38

## 2025-04-16 ASSESSMENT — PAIN DESCRIPTION - LOCATION
LOCATION: STERNUM;CHEST
LOCATION: CHEST
LOCATION: CHEST

## 2025-04-16 ASSESSMENT — PAIN SCALES - GENERAL
PAINLEVEL_OUTOF10: 4

## 2025-04-16 ASSESSMENT — PAIN DESCRIPTION - ORIENTATION
ORIENTATION: MID;ANTERIOR
ORIENTATION: MID

## 2025-04-16 ASSESSMENT — PAIN DESCRIPTION - DESCRIPTORS: DESCRIPTORS: ACHING

## 2025-04-16 NOTE — CARE COORDINATION
Care Management Progress Note    Reason for Admission:   Unstable angina (HCC) [I20.0]  Procedure(s) (LRB):  Left heart cath / coronary angiography w grafts (N/A)  Ultrasound guided vascular access (Right)  1 Day Post-Op    Patient Admission Status: Inpatient  RUR:   Hospitalization in the last 30 days (Readmission):  No        Transition of care plan:  Medical - ECHO pending  DC plan - home with family when stable and cards clears patient for dc  Discharge plan communicated with patient and/or discharge caregiver: Yes    Date 1st IMM letter given:   Outpatient follow-up - per MD  Transport at discharge:  Family

## 2025-04-16 NOTE — THERAPY EVALUATION
PHYSICAL THERAPY EVALUATION/DISCHARGE    Patient: Mayank Rivera (59 y.o. male)  Date: 4/16/2025  Primary Diagnosis: Unstable angina (HCC) [I20.0]  Procedure(s) (LRB):  Left heart cath / coronary angiography w grafts (N/A)  Ultrasound guided vascular access (Right) 1 Day Post-Op   Precautions: Restrictions/Precautions  Restrictions/Precautions: General Precautions            ASSESSMENT:  Patient is a 59 y.o. male with h/o CABG, daily ETOH use, DM2, HTN admitted to [facility] on 4/15/25 diagnosed with unstable angina now s/p cardiac cath and recommendations from cardiology for OP follow up and medical management. Patient seen for PT evaluation at this time and is agreeable to participation.     At baseline patient resides with his father in 1 level home, 6 stairs to enter. He reports being fully independent with mobility, ADL's and IADL's at baseline including driving and walking around a grocery store. He reports ongoing mild chest pain this date when he gets back from the bathroom - nursing aware. During PT evaluation, he was able to navigate his hospital room, stand to brush his teeth, toilet and ambulate > 100ft with stable HR & SpO2 and was asymptomatic until he returned to rest in bed when he reported \"a little mid-sternal chest pain\". Patient encouraged to notify staff if chest pain worsens.     Functional Outcome Measure:  The patient scored 24/24 on the [facility] outcome measure.      Vital signs taken during this PT session:  HR 90's, SpO2 97% room air with activity       Further skilled acute physical therapy is not indicated at this time.       PLAN :  Recommendation for discharge: (in order for the patient to meet his/her long term goals):   No skilled physical therapy    Other factors to consider for discharge: no additional factors    IF patient discharges home will need the following DME: none       SUBJECTIVE:   Patient stated “I don't understand why I feel like this .”    OBJECTIVE DATA

## 2025-04-16 NOTE — PROGRESS NOTES
Reached out to cardiology in regards to discharge and new ECHO results. Per NP Solitario, ok to discharge from a cardiology standpoint.       1700 - Patient discharged with all belongings via wheelchair and volunteer

## 2025-04-16 NOTE — PLAN OF CARE
Problem: Chronic Conditions and Co-morbidities  Goal: Patient's chronic conditions and co-morbidity symptoms are monitored and maintained or improved  Outcome: Progressing  Flowsheets (Taken 4/15/2025 2000)  Care Plan - Patient's Chronic Conditions and Co-Morbidity Symptoms are Monitored and Maintained or Improved: Monitor and assess patient's chronic conditions and comorbid symptoms for stability, deterioration, or improvement     Problem: Discharge Planning  Goal: Discharge to home or other facility with appropriate resources  Outcome: Progressing     Problem: Safety - Adult  Goal: Free from fall injury  Outcome: Progressing      details…

## 2025-04-16 NOTE — CARDIO/PULMONARY
[facility]  Pt on hospital troponin elevation list.  Reason for cath- NSTEMI diagnosis; further cardiology discharge summary, diagnosis was CAD with angina.   Cath on 4/15/25- no change from cath in 2019.  Any further testing will be outpatient.  Will request referral for CAD with angina from primary cardiologist- Dr. Paul.   ANGELA Washburn RN          C

## 2025-04-16 NOTE — PROGRESS NOTES
[address] [address] [id number]   Office [phone]  Fax [phone]          Subjective / Objective     Subjective  Overnight Events: KELSEY  Patient seen and examined at bedside. Reports chest pain still ongoing, primarily exertional however when he goes to the bathroom. Denies CP, SOB, N/V, dysuria.    Respiratory:   RA  Vitals:    04/16/25 1108   BP: 129/75   Pulse: 86   Resp: 21   Temp: 97.9 °F (36.6 °C)   SpO2: 94%     Physical Examination:   General appearance - alert, well appearing, and in no distress  Chest - clear to auscultation, no wheezes, rales or rhonchi, symmetric air entry  Heart - normal rate, regular rhythm, normal S1, S2, 1/6 systolic ejection murmur at LUSB no rubs, clicks or gallops,   Abdomen - soft, nontender, nondistended, no masses or organomegaly  Neurological - alert, oriented, normal speech, no focal findings   Skin - warm, dry. No notable rashes  Extremities - peripheral pulses normal, 1+ bilateral pedal edema, no clubbing or cyanosis  Psychiatric - normal speech and thought processes    I/O:  04/15 0701 - 04/16 0700  In: -   Out: 4   Inpatient Medications  Current Facility-Administered Medications   Medication Dose Route Frequency    thiamine mononitrate tablet 100 mg  100 mg Oral Daily    folic acid (FOLVITE) tablet 1 mg  1 mg Oral Daily    enoxaparin (LOVENOX) injection 40 mg  40 mg SubCUTAneous Daily    [START ON 4/17/2025] isosorbide mononitrate (IMDUR) extended release tablet 60 mg  60 mg Oral Daily    ranolazine (RANEXA) extended release tablet 500 mg  500 mg Oral BID    [START ON 4/17/2025] pantoprazole (PROTONIX) tablet 40 mg  40 mg Oral QAM AC    ezetimibe (ZETIA) tablet 10 mg  10 mg Oral Nightly    sodium chloride flush 0.9 % injection 5-40 mL  5-40 mL IntraVENous 2 times per day    sodium chloride flush 0.9 % injection 5-40 mL  5-40 mL IntraVENous PRN    0.9 % sodium chloride infusion   IntraVENous PRN    ondansetron (ZOFRAN-ODT) disintegrating tablet 4 mg

## 2025-04-16 NOTE — PROGRESS NOTES
Full note pending  Cath similar to 2019, continue medical management and keep fu in office  Increase Imdur to 60 mg am  Add Ranexa  Refill NTG SL   Add PPI like omeprazole 40mg for 6 weeks  Ok for dc today from cards point of view-pt wants to go home  Future Appointments   Date Time Provider Department Center   5/27/2025  1:00 PM Neelam Hendricks, APRN - NP [facility]

## 2025-04-16 NOTE — DISCHARGE SUMMARY
[id number] [id number]   Office [phone]  Fax [phone]       Discharge / Transfer / Off-Service Note     Name: Mayank Rivera MRN: [id number]  Sex: Male   YOB: 1965  Age: 59 y.o.  PCP: Paige Beltrán MD     Date of admission: 4/15/2025  Date of discharge/transfer: 4/16/2025    Attending physician at admission: Keshawn Staley MD.  Attending physician at discharge/transfer: Keshawn Staley MD.  Resident physician at discharge/transfer: Mahi Finch MD     Consultants during hospitalization  IP CONSULT TO CARDIOLOGY  IP CONSULT TO FAMILY MEDICINE     Admission diagnoses   Unstable angina (HCC) [I20.0]    Recommended follow-up after discharge    1. PCP-Paige Beltrán MD  2. Cardiology -Dr Paul     To follow up on with PCP:   Ensure follow up with cardiology. Ensure compliance with newly initiated zetia, omeprazole. Imdur increased to 60mg.    ------------------------------------------------------------------------------------------------------------------    History of Present Illness    As per admitting provider:   \"Mayank Rivera is a 59 y.o. male with PMH CAD s/p CABG, T2DM, HTN, Alcohol Abuse who presents to the ER complaining of chest pain. Sx started on 4/13 with midline substernal crushing pain, intermittent and initially with movement. Then progressed to at rest this AM and was worsened so he took one tab nitroglycerin. En route to hospital, given 2x nitro and ASA 324mg. CP improved after nitro. At this time endorsing 6/10 chest pain that is radiating down the right arm. Reports similar pain to prior when needed CABG. Feeling increased shortness of breath. No pain elsewhere. Denies recent medication changes other than being prescribed GLP1, not yet started. Of note, anniversary of mother's death upcoming.     In the ER:   Vitals: Temp 98.4   /84      RR 18   SatO2  93% on RA  Labs: Trop 34, , K 5.2, Glucose 251  Imaging: CXR NAP  Treatment:  with nephropathy (HCC)    Hypercholesterolemia    Coronary artery disease involving native coronary artery of native heart    Diabetic glomerulopathy (HCC)    Numbness and tingling in both hands    Unstable angina (HCC)           Medication List        START taking these medications      ezetimibe 10 MG tablet  Commonly known as: Zetia  Take 1 tablet by mouth daily     nitroGLYCERIN 0.3 MG SL tablet  Commonly known as: Nitrostat  Place 1 tablet under the tongue once as needed for Chest pain up to max of 3 total doses. If no relief after 1 dose, call 911.     omeprazole 20 MG delayed release capsule  Commonly known as: PRILOSEC  Take 1 capsule by mouth every morning (before breakfast)     ranolazine 500 MG extended release tablet  Commonly known as: RANEXA  Take 1 tablet by mouth 2 times daily            CHANGE how you take these medications      isosorbide mononitrate 30 MG extended release tablet  Commonly known as: IMDUR  Take 2 tablets by mouth daily  What changed: how much to take            CONTINUE taking these medications      amLODIPine 5 MG tablet  Commonly known as: NORVASC  Take 1 tablet by mouth daily     aspirin 81 MG EC tablet     dapagliflozin 10 MG tablet  Commonly known as: Farxiga  Take 1 tablet by mouth every morning For diabetic nephropathy     glipiZIDE 5 MG extended release tablet  Commonly known as: GLUCOTROL XL  Take 2 tablets by mouth 2 times daily     hydroCHLOROthiazide 25 MG tablet  Commonly known as: HYDRODIURIL  Take 1 tablet by mouth daily for blood pressure     irbesartan 300 MG tablet  Commonly known as: AVAPRO     metFORMIN 500 MG tablet  Commonly known as: GLUCOPHAGE  TAKE 2 TABLETS BY MOUTH TWICE A DAY WITH MORNING AND EVENING MEALS     metoprolol succinate 100 MG extended release tablet  Commonly known as: TOPROL XL  TAKE 1 TABLET BY MOUTH TWICE A DAY     rosuvastatin 20 MG tablet  Commonly known as: CRESTOR  Take 1 tablet by mouth nightly     Semaglutide(0.25 or 0.5MG/DOS) 2

## 2025-04-16 NOTE — CARE COORDINATION
Care Management Initial Assessment  4/16/2025 8:44 AM  If patient is discharged prior to next notation, then this note serves as note for discharge by case management.    Reason for Admission:   Unstable angina (HCC) [I20.0]  Procedure(s) (LRB):  Left heart cath / coronary angiography w grafts (N/A)  Ultrasound guided vascular access (Right)  1 Day Post-Op    Patient Admission Status: Inpatient  Date Admitted to INP: 4/15  RUR: Readmission Risk Score: 7.1    Hospitalization in the last 30 days (Readmission):  No        Advance Care Planning:  Code Status: Full Code  Primary Healthcare Decision Maker:     Advance Directive: has NO advanced directive - not interested in additional information     __________________________________________________________________________  Assessment:      04/16/25 0844   Service Assessment   Patient Orientation Alert and Oriented   Cognition Alert   History Provided By Medical Record   Primary Caregiver Self   Support Systems Family Members   Prior Functional Level Independent in ADLs/IADLs   Discharge Planning   Patient expects to be discharged to: House   Services At/After Discharge   Mode of Transport at Discharge Other (see comment)  (family)   Confirm Follow Up Transport Self     Comments: Patient with low readmission risk score of 7%. No identified CM needs at this time. Please consult CM for any discharge planning needs that may arise. Patient is s/p LHC, anticipate home with family when stable with  f/u OP with cardiology.    Discharge Concerns: []Yes [x]No []Unknown   Describe:    Financial concerns/barriers: []Yes, explain: []No [x]Unknown/Not discussed  __________________________________________________________________________    Insurer:   Active Insurance as of 4/15/2025       Primary Coverage       Payor Plan Insurance Group Employer/Plan Group    [place] [id number]       Payor Address Payor Phone Number Payor Fax Number Effective Dates    [id number]

## 2025-04-16 NOTE — PROGRESS NOTES
[facility]  Tana Zarate MD 2025   Cardiology Care Note                  []Initial visit     []Established visit     Patient Name: Mayank Rivera - :1965 - 59 y.o.   Rounding Cardiologist: Tana Zarate MD,[facility]  Primary Cardiologist: Noman Paul MD  Reason for initial consult: [place]  Last EF by echo/MRI: 55 %   Mayank Rivera is a 59 y.o. male   Overnight noted chest pain including this morning when going to the bathroom.  It is midsternal.  Catheterization showed open LIMA to LAD, left main open, LAD severe diffuse disease mid 100%.  Circumflex mid diffuse 60%.  OM1 diffuse disease backfilling occluded graft noted.  RCA nondominant small diffuse disease.  LIMA to LAD patent SVG to diagonal OM occluded LVEDP normal.   Assessment and Plan   1.  CAD with unstable angina  Several days of chest pain or overly by nitro  Elevated pulse  Show summary report of 2019 with open LIMA to LAD occluded vein grafts and nondominant right.  Dr. Pate suggest medical  Continued chest pain  Some of this may be GI start omeprazole   Increase Imdur to 60  Refill home nitro  Start Ranexa  Will have follow-up with patient in a few weeks  Currently with appointment in May but will move that up  Echo ordered- if ok then ok to dc from cards, or can be done as OP  Spoke to pt and family at length about plans,cath etc    2.  Hypertension  -154 resume home meds    3.  Alcohol use  At risk for DTs defer to primary team    4.  Hypercholesterolemia continue high potency statin check LDL if greater than 55 add Zetia, previous LDL 65 in 2024 and 53 in 2023  Add Zetia  Lab Results   Component Value Date    LDL 60 04/15/2025      5.  Diabetes per primary team previous hemoglobin A1c 6.4%  Hemoglobin A1C   Date Value Ref Range Status   04/15/2025 6.3 (H) 4.0 - 5.6 % Final     Comment:      (NOTE)  HbA1C Interpretive Ranges  <5.7              Normal  5.7 - 6.4         Consider Prediabetes  >6.5              Consider Diabetes        6.  Thrombocytopenia     SUBJECTIVE:        Previous HPI:Mayank Rivera is a 59 y.o. male withPatient reports chest pain the last 2 nights.  They also occur in the daytime.  Took 5 nitro last night and 2 nitro during the day.  He has not had pain like this since 2019.  History of prior CABG  History of cath 2019 diffuse disease  Significant diabetes  Significant alcohol use  Nitro were old but that he did feel worked  Pain was typically at night while laying down but there was daytime pain also  No recent change in shortness of breath edema no new palpitations or syncope.  Previously has been on beta-blocker Imdur statin aspirin CCB seen in office 10/21/2024 by nurse practitioner longest  He is here with his father and brother-in-law today in the ER  Lab work notable for sodium 134 potassium 5.2 creatinine 1.25 proBNP 641 troponin 34  CT chest shows no PE mild groundglass appearance lower lungs.  No edema chest x-ray no acute process  EKG sinus tachycardia  anterior infarct  Last echo 6/15/2023 EF 61    Past cardiac history:  CABG 2013  Echo 6/15/2023 normal EF  Cardiac cath 5/31/2019 LAD occluded LIMA to LAD patent occluded SVG to diagonal occluded SVG to OM distal PLB disease medical management Dr. Keyur Quezada [place]  Hypertension  Dyslipidemia on Crestor  Past medical history:  Diabetes mellitus 2  Thrombocytopenia  Alcohol use  Moved from [place] region  22 retired from [employer] 2019 family pout and single lives with parents    Interval,overnight:   Patient Vitals for the past 12 hrs:   Temp Pulse Resp BP SpO2   04/16/25 0730 97.9 °F (36.6 °C) 84 18 126/68 90 %   04/16/25 0701 -- 85 -- -- --   04/16/25 0325 98.1 °F (36.7 °C) 77 18 116/65 97 %   04/15/25 2300 97.5 °F (36.4 °C) 81 18 120/67 95 %   04/15/25 2255 -- 84 -- -- --     tablet 5 mg, 5 mg, Oral, Daily, Gia Bardales MD    aspirin EC tablet 81 mg, 81 mg, Oral, Daily, Gia Bardales MD, 81 mg at 04/16/25 0737    [Held by provider] empagliflozin (JARDIANCE) tablet 10 mg, 10 mg, Oral, Daily, Gia Bardales MD    [Held by provider] hydroCHLOROthiazide (HYDRODIURIL) tablet 25 mg, 25 mg, Oral, Daily, Gia Bardales MD    isosorbide mononitrate (IMDUR) extended release tablet 30 mg, 30 mg, Oral, Daily, Gia Bardales MD, 30 mg at 04/16/25 0738    losartan (COZAAR) tablet 100 mg, 100 mg, Oral, Daily, Gia Bardales MD, 100 mg at 04/16/25 0737    metoprolol succinate (TOPROL XL) extended release tablet 100 mg, 100 mg, Oral, BID, Gia Bardales MD, 100 mg at 04/16/25 0737    rosuvastatin (CRESTOR) tablet 20 mg, 20 mg, Oral, Nightly, Gia Bardales MD, 20 mg at 04/15/25 2025    nitroglycerin (NITRO-BID) 2 % ointment 1 inch, 1 inch, Topical, Once PRN, Lisa Solares DO    multivitamin 1 tablet, 1 tablet, Oral, Daily, Gia Bardales MD, 1 tablet at 04/16/25 0738    acetaminophen (TYLENOL) tablet 650 mg, 650 mg, Oral, Q4H PRN, Collin Betancourt MD  @[facility]@   Patient Care Team:  Paige Beltrán MD as PCP - General  Paige Beltrán MD as PCP - Empaneled Provider  Tana Zarate MD as Consulting Physician (Cardiovascular Disease)     Tana Zarate MD, [facility]   [facility] [address], [address]  [place], [place] [address] [phone]

## 2025-04-17 ENCOUNTER — TELEPHONE (OUTPATIENT)
Age: 60
End: 2025-04-17

## 2025-04-17 NOTE — TELEPHONE ENCOUNTER
Care Transitions Initial Follow Up Call    Outreach made within 2 business days of discharge: Yes    Patient: Mayank Rivera Patient : 1965   MRN: [id number]  Reason for Admission: unstable angina  Discharge Date: 25       Spoke with: l/jennifer on vm to call back to [facility] appt and to MERY call     Discharge department/facility: [facility]      Follow Up  Future Appointments   Date Time Provider Department Center   2025  2:20 PM Noman Paul MD [facility] AMB   2025  1:00 PM Neelam Hendricks, APRN - NP [facility] AMB       CHASITY CUEVAS MA

## 2025-04-18 ENCOUNTER — TELEPHONE (OUTPATIENT)
Age: 60
End: 2025-04-18

## 2025-04-18 NOTE — TELEPHONE ENCOUNTER
Care Transitions Initial Follow Up Call    Outreach made within 2 business days of discharge: Yes    Patient: Mayank Rivera Patient : 1965   MRN: [id number]  Reason for Admission: Unstable angina (HCC)      Discharge Date: 25       Spoke with: LVM    Discharge department/facility: [facility]        Scheduled appointment with PCP within 7-14 days    Follow Up  Future Appointments   Date Time Provider Department Center   2025  2:20 PM Noman Paul MD [facility] AMB   2025  1:00 PM Neelam Hendricks, APRN - NP [facility] NEGIN Saavedra MA

## 2025-04-24 ENCOUNTER — OFFICE VISIT (OUTPATIENT)
Age: 60
End: 2025-04-24
Payer: COMMERCIAL

## 2025-04-24 VITALS
RESPIRATION RATE: 17 BRPM | OXYGEN SATURATION: 98 % | HEIGHT: 62 IN | SYSTOLIC BLOOD PRESSURE: 134 MMHG | BODY MASS INDEX: 30.55 KG/M2 | DIASTOLIC BLOOD PRESSURE: 80 MMHG | HEART RATE: 75 BPM | WEIGHT: 166 LBS

## 2025-04-24 DIAGNOSIS — Z95.1 HX OF CABG: ICD-10-CM

## 2025-04-24 DIAGNOSIS — E78.2 MIXED HYPERLIPIDEMIA: ICD-10-CM

## 2025-04-24 DIAGNOSIS — I10 PRIMARY HYPERTENSION: ICD-10-CM

## 2025-04-24 DIAGNOSIS — I25.10 CORONARY ARTERY DISEASE INVOLVING NATIVE CORONARY ARTERY OF NATIVE HEART, UNSPECIFIED WHETHER ANGINA PRESENT: ICD-10-CM

## 2025-04-24 DIAGNOSIS — I25.10 CORONARY ARTERY DISEASE DUE TO LIPID RICH PLAQUE: Primary | ICD-10-CM

## 2025-04-24 DIAGNOSIS — R60.9 EDEMA, UNSPECIFIED TYPE: ICD-10-CM

## 2025-04-24 DIAGNOSIS — I25.83 CORONARY ARTERY DISEASE DUE TO LIPID RICH PLAQUE: Primary | ICD-10-CM

## 2025-04-24 PROCEDURE — 93000 ELECTROCARDIOGRAM COMPLETE: CPT | Performed by: SPECIALIST

## 2025-04-24 PROCEDURE — 3075F SYST BP GE 130 - 139MM HG: CPT | Performed by: SPECIALIST

## 2025-04-24 PROCEDURE — 99215 OFFICE O/P EST HI 40 MIN: CPT | Performed by: SPECIALIST

## 2025-04-24 PROCEDURE — 3079F DIAST BP 80-89 MM HG: CPT | Performed by: SPECIALIST

## 2025-04-24 RX ORDER — RANOLAZINE 1000 MG/1
1000 TABLET, EXTENDED RELEASE ORAL 2 TIMES DAILY
Qty: 180 TABLET | Refills: 3 | Status: SHIPPED | OUTPATIENT
Start: 2025-04-24

## 2025-04-24 RX ORDER — NITROGLYCERIN 0.4 MG/1
0.4 TABLET SUBLINGUAL EVERY 5 MIN PRN
Qty: 50 TABLET | Refills: 12 | Status: SHIPPED | OUTPATIENT
Start: 2025-04-24

## 2025-04-24 RX ORDER — ISOSORBIDE MONONITRATE 30 MG/1
60 TABLET, EXTENDED RELEASE ORAL DAILY
Qty: 180 TABLET | Refills: 3 | Status: SHIPPED | OUTPATIENT
Start: 2025-04-24

## 2025-04-24 RX ORDER — EZETIMIBE 10 MG/1
10 TABLET ORAL DAILY
Qty: 90 TABLET | Refills: 3 | Status: SHIPPED | OUTPATIENT
Start: 2025-04-24

## 2025-04-24 NOTE — PROGRESS NOTES
had concerns including Follow-Up from Hospital.    Vitals:    04/24/25 1420   BP: 134/80   BP Site: Left Upper Arm   Patient Position: Sitting   Pulse: 75   Resp: 17   SpO2: 98%   Weight: 75.3 kg (166 lb)   Height: 1.575 m (5' 2\")        Chest pain No    Refills amlodipine    had concerns including Follow-Up from Hospital.    Vitals:    04/24/25 1420   BP: 134/80   BP Site: Left Upper Arm   Patient Position: Sitting   Pulse: 75   Resp: 17   SpO2: 98%   Weight: 75.3 kg (166 lb)   Height: 1.575 m (5' 2\")        Chest pain No    Refills No        1. Have you been to the ER, urgent care clinic since your last visit? No       Hospitalized since your last visit? No       Where?        Date?        1. Have you been to the ER, urgent care clinic since your last visit? No       Hospitalized since your last visit? No       Where?        Date?  
dose   - he also admits to excessive etoh and soda use which he knows to cut back on   - Swelling gone after decrease in amlodipine.      3) HTN   - continue meds above   - Goal < 130/80   - BP OK       4) Dyslipidemia  - Cont Crestor 20 mg daily and Zetia   - prior lipids fair       5) Etoh   - admits drinking too much -- discussed cutting back   - says he drinks 1 pint a whisky a day since pandemic (also drinks a lot of soda)      6) DM   - per PCP     7) Thrombocytopenia  - per PCP     8) See NP in 1 month     Moved from [place] to [place] in [place].  Retired from [employer] 2019.  Family in [place].  Single.  Lives with elderly parents.         Noman Paul MD, FACC    [facility] [place]  [facility]  [address], [address]  [address]  [place], [place]  [address]  [place] [address]  Ph: [phone]   Ph [phone]    Total time (preparing to see the patient, reviewing data, seeing patient face to face, counseling patient, documenting information, etc) was   45 min.

## 2025-04-24 NOTE — PROGRESS NOTES
Physician Progress Note      PATIENT:               BURKE AVILA  CSN #:                  [id number]  :                       1965  ADMIT DATE:       4/15/2025 6:18 AM  DISCH DATE:        2025 4:58 PM  RESPONDING  PROVIDER #:        Tana Zarate MD          QUERY TEXT:    ATTENTION CARDIOVASCULAR SERVICES:    The attending is documenting the Diagnosis of NSTEMI.  Cardiology is documenting CAD with unstable Angina.    The clinical indicators include:  -H&P notes, PMH of CAD s/p CABG, DM Htn, Alcohol abuse.  -H&P chest pain.  -Per lab results, 34-- Attending notes NSTEMI  -Cards notes on "CAD with unstable angina.  -Per Orders [facility]  Options provided:  -- CAD with unstable Angina is  confirmed and?NSTEMI is ruled out  -- NSTEMI is confirmed and CAD with unstable angina is ruled out.  -- Other - I will add my own diagnosis  -- Disagree - Not applicable / Not valid  -- Disagree - Clinically unable to determine / Unknown  -- Refer to Clinical Documentation Reviewer    PROVIDER RESPONSE TEXT:    After study, NSTEMI is confirmed and CAD with unstable angina is ruled out.    Query created by: Yusra Kulkarni on 2025 1:10 PM      Electronically signed by:  Tana Zarate MD 2025 9:26 AM

## 2025-04-24 NOTE — PATIENT INSTRUCTIONS
Patient Education        Learning About the Mediterranean Diet  What is the Mediterranean diet?     The Mediterranean diet is a style of eating rather than a diet plan. It features foods eaten in [place], [place], southern [place], and other countries along the [place] Sea. It emphasizes eating foods like fish, fruits, vegetables, beans, high-fiber breads and whole grains, nuts, and olive oil. This style of eating includes limited red meat, cheese, and sweets.  Why choose the Mediterranean diet?  A Mediterranean-style diet may improve heart health. It contains more fat than other heart-healthy diets. But the fats are mainly from nuts, unsaturated oils (such as fish oils and olive oil), and certain nut or seed oils (such as canola, soybean, or flaxseed oil). These fats may help protect the heart and blood vessels.  How can you get started on the Mediterranean diet?  Here are some things you can do to switch to a more Mediterranean way of eating.  What to eat  Eat a variety of fruits and vegetables each day, such as grapes, blueberries, tomatoes, broccoli, peppers, figs, olives, spinach, eggplant, beans, lentils, and chickpeas.  Eat a variety of whole-grain foods each day, such as oats, brown rice, and whole wheat bread, pasta, and couscous.  Eat fish at least 2 times a week. Try tuna, salmon, mackerel, lake trout, herring, or sardines.  Eat moderate amounts of low-fat dairy products, such as milk, cheese, or yogurt.  Eat moderate amounts of poultry and eggs.  Choose healthy (unsaturated) fats, such as nuts, olive oil, and certain nut or seed oils like canola, soybean, and flaxseed.  Limit unhealthy (saturated) fats, such as butter, palm oil, and coconut oil. And limit fats found in animal products, such as meat and dairy products made with whole milk. Try to eat red meat only a few times a month in very small amounts.  Limit sweets and desserts to only a few times a week. This includes sugar-sweetened

## 2025-05-07 ENCOUNTER — APPOINTMENT (OUTPATIENT)
Dept: URBAN - METROPOLITAN AREA CLINIC 204 | Age: 60
Setting detail: DERMATOLOGY
End: 2025-05-07

## 2025-05-07 DIAGNOSIS — L57.8 OTHER SKIN CHANGES DUE TO CHRONIC EXPOSURE TO NONIONIZING RADIATION: ICD-10-CM

## 2025-05-07 DIAGNOSIS — L57.0 ACTINIC KERATOSIS: ICD-10-CM

## 2025-05-07 PROCEDURE — OTHER MIPS QUALITY: OTHER

## 2025-05-07 PROCEDURE — 17003 DESTRUCT PREMALG LES 2-14: CPT

## 2025-05-07 PROCEDURE — OTHER COUNSELING: OTHER

## 2025-05-07 PROCEDURE — 99212 OFFICE O/P EST SF 10 MIN: CPT | Mod: 25

## 2025-05-07 PROCEDURE — 17000 DESTRUCT PREMALG LESION: CPT

## 2025-05-07 PROCEDURE — OTHER LIQUID NITROGEN: OTHER

## 2025-05-07 ASSESSMENT — LOCATION DETAILED DESCRIPTION DERM
LOCATION DETAILED: LEFT FOREHEAD
LOCATION DETAILED: RIGHT INFERIOR LATERAL FOREHEAD
LOCATION DETAILED: RIGHT MEDIAL ZYGOMA
LOCATION DETAILED: LEFT SUPERIOR LATERAL MALAR CHEEK
LOCATION DETAILED: RIGHT CENTRAL ZYGOMA
LOCATION DETAILED: LEFT CENTRAL TEMPLE
LOCATION DETAILED: RIGHT INFERIOR FOREHEAD
LOCATION DETAILED: LEFT INFERIOR MEDIAL FOREHEAD
LOCATION DETAILED: LEFT INFERIOR FOREHEAD

## 2025-05-07 ASSESSMENT — LOCATION SIMPLE DESCRIPTION DERM
LOCATION SIMPLE: LEFT CHEEK
LOCATION SIMPLE: RIGHT FOREHEAD
LOCATION SIMPLE: RIGHT ZYGOMA
LOCATION SIMPLE: LEFT TEMPLE
LOCATION SIMPLE: LEFT FOREHEAD

## 2025-05-07 ASSESSMENT — LOCATION ZONE DERM: LOCATION ZONE: FACE

## 2025-05-09 RX ORDER — OMEPRAZOLE 20 MG/1
20 CAPSULE, DELAYED RELEASE ORAL
Qty: 90 CAPSULE | Refills: 1 | Status: SHIPPED | OUTPATIENT
Start: 2025-05-09

## 2025-06-09 ENCOUNTER — OFFICE VISIT (OUTPATIENT)
Age: 60
End: 2025-06-09
Payer: COMMERCIAL

## 2025-06-09 VITALS
OXYGEN SATURATION: 96 % | BODY MASS INDEX: 31.1 KG/M2 | HEART RATE: 74 BPM | RESPIRATION RATE: 20 BRPM | HEIGHT: 62 IN | DIASTOLIC BLOOD PRESSURE: 60 MMHG | TEMPERATURE: 98.2 F | SYSTOLIC BLOOD PRESSURE: 124 MMHG | WEIGHT: 169 LBS

## 2025-06-09 DIAGNOSIS — F10.10 ALCOHOL ABUSE, DAILY USE: ICD-10-CM

## 2025-06-09 DIAGNOSIS — E11.21 TYPE 2 DIABETES WITH NEPHROPATHY (HCC): ICD-10-CM

## 2025-06-09 DIAGNOSIS — Z81.8 FAMILY HISTORY OF PROBLEMS RELATED TO STRESS: ICD-10-CM

## 2025-06-09 DIAGNOSIS — I25.10 CORONARY ARTERY DISEASE INVOLVING NATIVE CORONARY ARTERY OF NATIVE HEART, UNSPECIFIED WHETHER ANGINA PRESENT: Primary | ICD-10-CM

## 2025-06-09 DIAGNOSIS — E11.69 TYPE 2 DIABETES MELLITUS WITH OTHER SPECIFIED COMPLICATION, WITHOUT LONG-TERM CURRENT USE OF INSULIN (HCC): ICD-10-CM

## 2025-06-09 PROCEDURE — 3044F HG A1C LEVEL LT 7.0%: CPT | Performed by: FAMILY MEDICINE

## 2025-06-09 PROCEDURE — 3074F SYST BP LT 130 MM HG: CPT | Performed by: FAMILY MEDICINE

## 2025-06-09 PROCEDURE — 3078F DIAST BP <80 MM HG: CPT | Performed by: FAMILY MEDICINE

## 2025-06-09 PROCEDURE — 99214 OFFICE O/P EST MOD 30 MIN: CPT | Performed by: FAMILY MEDICINE

## 2025-06-09 RX ORDER — BLOOD-GLUCOSE METER
EACH MISCELLANEOUS
Qty: 1 KIT | Refills: 0 | Status: SHIPPED | OUTPATIENT
Start: 2025-06-09

## 2025-06-09 RX ORDER — BLOOD SUGAR DIAGNOSTIC
STRIP MISCELLANEOUS
Qty: 100 EACH | Refills: 3 | Status: SHIPPED | OUTPATIENT
Start: 2025-06-09

## 2025-06-09 RX ORDER — GLIPIZIDE 5 MG/1
5 TABLET, FILM COATED, EXTENDED RELEASE ORAL 2 TIMES DAILY
Qty: 180 TABLET | Refills: 1 | Status: SHIPPED | OUTPATIENT
Start: 2025-06-09

## 2025-06-09 RX ORDER — LANCETS 30 GAUGE
EACH MISCELLANEOUS
Qty: 100 EACH | Refills: 5 | Status: SHIPPED | OUTPATIENT
Start: 2025-06-09

## 2025-06-09 NOTE — PROGRESS NOTES
Identified pt with two pt identifiers(name and ). Reviewed record in preparation for visit and have obtained necessary documentation. All patient medications has been reviewed.  Chief Complaint   Patient presents with    check ear tube     Right ear    Shortness of Breath    Follow-Up from Hospital     Patient states that he has had a heart attack since his last visit    Blood Sugar Problem     Low blood sugar         Health Maintenance Due   Topic    Shingles vaccine (1 of 2)    Respiratory Syncytial Virus (RSV) Pregnant or age 60 yrs+ (1 - Risk 60-74 years 1-dose series)    COVID-19 Vaccine (3 - 2024-25 season)    Diabetic retinal exam     Hepatitis B vaccine (2 of 2 - CpG 2-dose series)    Colorectal Cancer Screen      Health Maintenance Review: Patient reminded of \"due or due soon\" health maintenance. I have asked the patient to contact his/her primary care provider (PCP) for follow-up on his/her health maintenance.        Wt Readings from Last 3 Encounters:   25 76.7 kg (169 lb)   25 75.3 kg (166 lb)   25 77.6 kg (171 lb)     Temp Readings from Last 3 Encounters:   25 98.2 °F (36.8 °C) (Temporal)   25 98.4 °F (36.9 °C) (Oral)   25 97.9 °F (36.6 °C) (Oral)     BP Readings from Last 3 Encounters:   25 124/60   25 134/80   25 121/68     Pulse Readings from Last 3 Encounters:   25 74   25 75   25 84       Vitals:    25 1002   BP: 124/60   Pulse: 74   Resp: 20   Temp: 98.2 °F (36.8 °C)   SpO2: 96%        1. \"Have you been to the ER, urgent care clinic since your last visit?  Hospitalized since your last visit?\" Yes  for chest pains and was told he had an heart attack. Also Abe ponce for low blood sugar.     2. \"Have you seen or consulted any other health care providers outside of the [facility] since your last visit?\" Yes ENT   
Sitting, BP Cuff Size: Medium Adult)   Pulse 74   Temp 98.2 °F (36.8 °C) (Temporal)   Resp 20   Ht 1.575 m (5' 2\")   Wt 76.7 kg (169 lb)   SpO2 96%   BMI 30.91 kg/m²     Physical Exam  Vitals reviewed.   Constitutional:       Appearance: He is obese.   Cardiovascular:      Rate and Rhythm: Normal rate and regular rhythm.      Heart sounds: Normal heart sounds.   Pulmonary:      Effort: Pulmonary effort is normal.      Breath sounds: Normal breath sounds.   Neurological:      Mental Status: He is alert.   Psychiatric:         Attention and Perception: Attention normal.         Mood and Affect: Mood is depressed.         Speech: Speech normal.                 An electronic signature was used to authenticate this note.    --Paige Beltrán MD

## 2025-07-14 ENCOUNTER — OFFICE VISIT (OUTPATIENT)
Age: 60
End: 2025-07-14
Payer: COMMERCIAL

## 2025-07-14 VITALS
HEIGHT: 62 IN | HEART RATE: 80 BPM | BODY MASS INDEX: 30.18 KG/M2 | OXYGEN SATURATION: 94 % | WEIGHT: 164 LBS | DIASTOLIC BLOOD PRESSURE: 70 MMHG | SYSTOLIC BLOOD PRESSURE: 122 MMHG

## 2025-07-14 DIAGNOSIS — I50.21 ACUTE SYSTOLIC CONGESTIVE HEART FAILURE (HCC): ICD-10-CM

## 2025-07-14 DIAGNOSIS — I25.118 CORONARY ARTERY DISEASE OF NATIVE ARTERY OF NATIVE HEART WITH STABLE ANGINA PECTORIS: ICD-10-CM

## 2025-07-14 DIAGNOSIS — I10 PRIMARY HYPERTENSION: Primary | ICD-10-CM

## 2025-07-14 DIAGNOSIS — I42.9 CARDIOMYOPATHY, UNSPECIFIED TYPE (HCC): ICD-10-CM

## 2025-07-14 PROCEDURE — 3074F SYST BP LT 130 MM HG: CPT | Performed by: SPECIALIST

## 2025-07-14 PROCEDURE — 99215 OFFICE O/P EST HI 40 MIN: CPT | Performed by: SPECIALIST

## 2025-07-14 PROCEDURE — 3078F DIAST BP <80 MM HG: CPT | Performed by: SPECIALIST

## 2025-07-14 RX ORDER — SPIRONOLACTONE 25 MG/1
25 TABLET ORAL DAILY
COMMUNITY

## 2025-07-14 RX ORDER — FUROSEMIDE 40 MG/1
40 TABLET ORAL DAILY
COMMUNITY
End: 2025-07-14

## 2025-07-14 RX ORDER — FUROSEMIDE 20 MG/1
20 TABLET ORAL DAILY
Qty: 90 TABLET | Refills: 3 | Status: SHIPPED | OUTPATIENT
Start: 2025-07-14

## 2025-07-14 NOTE — PROGRESS NOTES
Noman Paul MD. [facility]          Patient: Mayank Rivera  : 1965      Today's Date: 2025        HISTORY OF PRESENT ILLNESS:     History of Present Illness:  Recent admission - DC note stated:  NSTEMI: Presented w unstable angina,Troponin 34>63>710. S/p [facility] 4/15/25 with no PCI. . EKG sinus tachycardia, rate 105, no ST elevations. CXR NAP. CTA no PE. Heart score 6. Risk Factors: known CAD s/p CABG, HTN, HLD. S/p 2x nitro and 324mg ASA en route to hospital, nitroglycerin paste in the ED. Follows o/p Dr. Paul, missed last appointment. Repeat Echo with EF 50-55%.     On 25 - he is still having chest pains - sometimes worse when laying certain way in bed and in AM - feels better during the day walking around     On 25 - Was at [facility] last week.       PAST MEDICAL HISTORY:     Past Medical History:   Diagnosis Date    CAD (coronary artery disease)     Diabetes (HCC)     Hx of CABG     Aug 2013 at [facility] in [place]     Hypercholesterolemia     Hypertension        Past Surgical History:   Procedure Laterality Date    CABG, ARTERY-VEIN, THREE      CARDIAC CATHETERIZATION  2019    CARDIAC PROCEDURE N/A 4/15/2025    Left heart cath / coronary angiography w grafts performed by Collin Betancourt MD at [facility] CARDIAC CATH LAB    CORONARY ARTERY BYPASS GRAFT  2013    HEENT Left     eye vitrectomy    INVASIVE VASCULAR Right 4/15/2025    Ultrasound guided vascular access performed by Collin Betancourt MD at [facility] CARDIAC CATH LAB    VITRECTOMY Left              CURRENT MEDICATIONS:    .  Current Outpatient Medications   Medication Sig Dispense Refill    spironolactone (ALDACTONE) 25 MG tablet Take 1 tablet by mouth daily      furosemide (LASIX) 40 MG tablet Take 1 tablet by mouth daily      glipiZIDE (GLUCOTROL XL) 5 MG extended release tablet Take 1 tablet by mouth 2 times daily 180 tablet 1    ranolazine (RANEXA) 1000 MG extended release tablet Take 1 tablet by mouth 2 times

## 2025-07-14 NOTE — PROGRESS NOTES
Chief Complaint   Patient presents with    Hypertension    hx of CABG    Coronary Artery Disease     Vitals:    07/14/25 1033   BP: 122/70   BP Site: Left Upper Arm   Patient Position: Sitting   Pulse: 80   SpO2: 94%   Weight: 74.4 kg (164 lb)   Height: 1.575 m (5' 2\")       Chest pain YES     ER, urgent care, or hospitalized outside of [facility] since your last visit?  YES [facility]    Refills NO     SOB     Patient had questions bout medications wanted to discuss with doctor before verifying

## 2025-07-28 ENCOUNTER — TELEPHONE (OUTPATIENT)
Age: 60
End: 2025-07-28

## 2025-07-28 NOTE — TELEPHONE ENCOUNTER
Received request for    Accu-Chek Softclix Lancets    Accu-Chek Guide Test Strips        [facility]    I see we sent in [facility] on 6/9 with refills to local [facility] pharmacy. I will also place fax into Dr. Beltrán' box.

## 2025-07-29 ENCOUNTER — OFFICE VISIT (OUTPATIENT)
Age: 60
End: 2025-07-29
Payer: COMMERCIAL

## 2025-07-29 ENCOUNTER — LAB (OUTPATIENT)
Age: 60
End: 2025-07-29

## 2025-07-29 VITALS
SYSTOLIC BLOOD PRESSURE: 128 MMHG | DIASTOLIC BLOOD PRESSURE: 80 MMHG | HEIGHT: 62 IN | WEIGHT: 161.4 LBS | RESPIRATION RATE: 19 BRPM | TEMPERATURE: 97.6 F | BODY MASS INDEX: 29.7 KG/M2 | HEART RATE: 76 BPM | OXYGEN SATURATION: 97 %

## 2025-07-29 DIAGNOSIS — I50.23 ACUTE ON CHRONIC SYSTOLIC CONGESTIVE HEART FAILURE (HCC): ICD-10-CM

## 2025-07-29 DIAGNOSIS — Z09 HOSPITAL DISCHARGE FOLLOW-UP: Primary | ICD-10-CM

## 2025-07-29 DIAGNOSIS — Z79.899 ENCOUNTER FOR LONG-TERM (CURRENT) USE OF MEDICATIONS: ICD-10-CM

## 2025-07-29 DIAGNOSIS — I25.110 CORONARY ARTERY DISEASE INVOLVING NATIVE CORONARY ARTERY OF NATIVE HEART WITH UNSTABLE ANGINA PECTORIS (HCC): ICD-10-CM

## 2025-07-29 DIAGNOSIS — F10.21 RECOVERING ALCOHOLIC (HCC): ICD-10-CM

## 2025-07-29 DIAGNOSIS — E11.21 TYPE 2 DIABETES WITH NEPHROPATHY (HCC): ICD-10-CM

## 2025-07-29 DIAGNOSIS — I42.9 CARDIOMYOPATHY, UNSPECIFIED TYPE (HCC): ICD-10-CM

## 2025-07-29 LAB — HBA1C MFR BLD: 7.2 %

## 2025-07-29 PROCEDURE — 3074F SYST BP LT 130 MM HG: CPT | Performed by: FAMILY MEDICINE

## 2025-07-29 PROCEDURE — 83036 HEMOGLOBIN GLYCOSYLATED A1C: CPT | Performed by: FAMILY MEDICINE

## 2025-07-29 PROCEDURE — 1111F DSCHRG MED/CURRENT MED MERGE: CPT | Performed by: FAMILY MEDICINE

## 2025-07-29 PROCEDURE — 99214 OFFICE O/P EST MOD 30 MIN: CPT | Performed by: FAMILY MEDICINE

## 2025-07-29 PROCEDURE — 3051F HG A1C>EQUAL 7.0%<8.0%: CPT | Performed by: FAMILY MEDICINE

## 2025-07-29 PROCEDURE — 3079F DIAST BP 80-89 MM HG: CPT | Performed by: FAMILY MEDICINE

## 2025-07-29 RX ORDER — BLOOD SUGAR DIAGNOSTIC
1 STRIP MISCELLANEOUS DAILY
COMMUNITY

## 2025-07-29 RX ORDER — LANCETS
EACH MISCELLANEOUS
Qty: 100 EACH | Refills: 3 | Status: SHIPPED | OUTPATIENT
Start: 2025-07-29

## 2025-07-29 RX ORDER — BLOOD-GLUCOSE METER
EACH MISCELLANEOUS
COMMUNITY

## 2025-07-29 NOTE — PROGRESS NOTES
Identified pt with two pt identifiers(name and )    Chief Complaint   Patient presents with    Follow-Up from Hospital     Patient was at [facility] for CHF and edema and swelling is better but still not feeling well     Discuss Medications        Health Maintenance Due   Topic    Shingles vaccine (1 of 2)    Respiratory Syncytial Virus (RSV) Pregnant or age 60 yrs+ (1 - Risk 60-74 years 1-dose series)    COVID-19 Vaccine (3 - 2024-25 season)    Diabetic retinal exam     Hepatitis B vaccine (2 of 2 - CpG 2-dose series)    Colorectal Cancer Screen        Wt Readings from Last 3 Encounters:   25 74.4 kg (164 lb)   25 76.7 kg (169 lb)   25 75.3 kg (166 lb)     Temp Readings from Last 3 Encounters:   25 98.2 °F (36.8 °C) (Temporal)   25 98.4 °F (36.9 °C) (Oral)   25 97.9 °F (36.6 °C) (Oral)     BP Readings from Last 3 Encounters:   25 122/70   25 124/60   25 134/80     Pulse Readings from Last 3 Encounters:   25 80   25 74   25 75           Depression Screening:  :         2025     1:48 PM 2024    11:09 AM 6/15/2023     2:07 PM 2022     9:00 AM 2019     3:00 PM   PHQ-9 Questionaire   Little interest or pleasure in doing things 0 0 0 0 0   Feeling down, depressed, or hopeless 0 0 0 0 0   Trouble falling or staying asleep, or sleeping too much 0 0      Feeling tired or having little energy 0 0      Poor appetite or overeating 0 0      Feeling bad about yourself - or that you are a failure or have let yourself or your family down 0 0      Trouble concentrating on things, such as reading the newspaper or watching television 0 0      Moving or speaking so slowly that other people could have noticed. Or the opposite - being so fidgety or restless that you have been moving around a lot more than usual 0 0      Thoughts that you would be better off dead, or of hurting yourself in some way 0 0      PHQ-9 Total Score 0 0 0 0

## 2025-07-29 NOTE — PROGRESS NOTES
Post-Discharge Transitional Care Management Progress Note      Mayank Rivera   YOB: 1965    Date of Office Visit:  7/29/2025  Date of Hospital Admission: 7/8/2025 [facility]  Date of Hospital Discharge: 7/9/2025    Care management risk score Rising risk (score 2-5) and Complex Care (Scores >=6): No Risk Score On File     Non face to face  following discharge, date last encounter closed (first attempt may have been earlier): *No documented post hospital discharge outreach found in the last 14 days *No documented post hospital discharge outreach found in the last 14 days    Call initiated 2 business days of discharge: *No response recorded in the last 14 days    ASSESSMENT/PLAN:   Hospital discharge follow-up  -     IN DISCHARGE MEDS RECONCILED W/ CURRENT OUTPATIENT MED LIST  Acute on chronic systolic congestive heart failure (HCC)  Cardiomyopathy, unspecified type (HCC)  Type 2 diabetes with nephropathy ([facility])  -     Accu-Chek Softclix Lancets MISC; Disp-100 each, R-3, NormalCheck sugar once daily.  -     AMB POC HEMOGLOBIN A1C  Recovering alcoholic (HCC)  Encounter for long-term (current) use of medications  -     Comprehensive Metabolic Panel; Future  Coronary artery disease involving native coronary artery of native heart with unstable angina pectoris ([facility])    A1c will be checked today.  Also draw CMP.  I will contact Dr. Paul to try to accommodate a sooner appointment for patient to follow-up.  Patient was discharged from [facility] on Entresto but he was instructed by cardiology to stay on irbesartan for now.  Plans to recheck echo at next visit.  Medical Decision Making: moderate complexity  No follow-ups on file.         Subjective:   HPI:  Follow up of Hospital problems/diagnosis(es): Patient admitted for acute congestive heart failure, edema, pleural effusions.    Inpatient course: Discharge summary reviewed- see chart.  EF very low 25 to 30%.  Patient was diuresed.    Interval

## 2025-07-30 ENCOUNTER — RESULTS FOLLOW-UP (OUTPATIENT)
Age: 60
End: 2025-07-30

## 2025-07-30 DIAGNOSIS — E87.5 HYPERKALEMIA: ICD-10-CM

## 2025-07-30 DIAGNOSIS — E11.21 TYPE 2 DIABETES WITH NEPHROPATHY (HCC): Primary | ICD-10-CM

## 2025-07-30 LAB
ALBUMIN SERPL-MCNC: 4.5 G/DL (ref 3.5–5.2)
ALBUMIN/GLOB SERPL: 1.5 (ref 1.1–2.2)
ALP SERPL-CCNC: 94 U/L (ref 40–129)
ALT SERPL-CCNC: 34 U/L (ref 10–35)
ANION GAP SERPL CALC-SCNC: 14 MMOL/L (ref 2–14)
AST SERPL-CCNC: 22 U/L (ref 10–50)
BILIRUB SERPL-MCNC: 0.6 MG/DL (ref 0–1.2)
BUN SERPL-MCNC: 31 MG/DL (ref 8–23)
BUN/CREAT SERPL: 20 (ref 12–20)
CALCIUM SERPL-MCNC: 10.9 MG/DL (ref 8.8–10.2)
CHLORIDE SERPL-SCNC: 94 MMOL/L (ref 98–107)
CO2 SERPL-SCNC: 25 MMOL/L (ref 20–29)
CREAT SERPL-MCNC: 1.52 MG/DL (ref 0.7–1.2)
GLOBULIN SER CALC-MCNC: 3 G/DL (ref 2–4)
GLUCOSE SERPL-MCNC: 328 MG/DL (ref 65–100)
POTASSIUM SERPL-SCNC: 5.6 MMOL/L (ref 3.5–5.1)
PROT SERPL-MCNC: 7.5 G/DL (ref 6.4–8.3)
SODIUM SERPL-SCNC: 133 MMOL/L (ref 136–145)

## 2025-07-31 ENCOUNTER — TELEPHONE (OUTPATIENT)
Age: 60
End: 2025-07-31

## 2025-07-31 NOTE — TELEPHONE ENCOUNTER
I called patient and relayed lab results. He understood and understood change of Glipizide. He stated that he has an appointment with Dr Paul on Tuesday. He will come back in 1 month for follow up lab work.

## 2025-07-31 NOTE — TELEPHONE ENCOUNTER
----- Message from Terra Askew sent at 7/31/2025  9:18 AM EDT -----  Can labs be put in for repeat labs to be scheduled?

## 2025-08-01 NOTE — TELEPHONE ENCOUNTER
Future Appointments   Date Time Provider Department Center   8/5/2025 10:00 AM Neelam Hendricks, APRN - NP [facility] AMB   9/10/2025 10:30 AM Paige Beltrán MD [facility] DEP   9/18/2025  1:30 PM [facility] ECHO 3 [facility] AMB   9/18/2025  2:40 PM Alondra Dodson, APRN - NP [facility] AMB     ID verified using two patient identifiers. Writer spoke with patient and discussed below. Patient stated understanding and repeated directions of medication change. Patient thanked this writer for the call.    Per Dr. Noman Paul;  \"Can you please ask him to cut aldactone in half to 12.5 mg daily.   Thanks!\"

## 2025-08-05 ENCOUNTER — OFFICE VISIT (OUTPATIENT)
Age: 60
End: 2025-08-05
Payer: COMMERCIAL

## 2025-08-05 VITALS
HEART RATE: 65 BPM | HEIGHT: 62 IN | BODY MASS INDEX: 29.44 KG/M2 | WEIGHT: 160 LBS | DIASTOLIC BLOOD PRESSURE: 70 MMHG | SYSTOLIC BLOOD PRESSURE: 110 MMHG | OXYGEN SATURATION: 98 %

## 2025-08-05 DIAGNOSIS — I42.9 CARDIOMYOPATHY, UNSPECIFIED TYPE (HCC): ICD-10-CM

## 2025-08-05 DIAGNOSIS — I25.118 CORONARY ARTERY DISEASE OF NATIVE ARTERY OF NATIVE HEART WITH STABLE ANGINA PECTORIS: ICD-10-CM

## 2025-08-05 DIAGNOSIS — Z95.1 HX OF CABG: ICD-10-CM

## 2025-08-05 DIAGNOSIS — I10 PRIMARY HYPERTENSION: Primary | ICD-10-CM

## 2025-08-05 DIAGNOSIS — E78.2 MIXED HYPERLIPIDEMIA: ICD-10-CM

## 2025-08-05 PROCEDURE — 99214 OFFICE O/P EST MOD 30 MIN: CPT

## 2025-08-05 PROCEDURE — 3074F SYST BP LT 130 MM HG: CPT

## 2025-08-05 PROCEDURE — 3078F DIAST BP <80 MM HG: CPT

## (undated) PROCEDURE — B211YZZ FLUOROSCOPY OF MULTIPLE CORONARY ARTERIES USING OTHER CONTRAST: ICD-10-PCS

## (undated) PROCEDURE — 4A023N7 MEASUREMENT OF CARDIAC SAMPLING AND PRESSURE, LEFT HEART, PERCUTANEOUS APPROACH: ICD-10-PCS

## (undated) DEVICE — GUIDEWIRE VASC L180CM DIA0.035IN 3MM PTFE J TIP EXCHG FIX

## (undated) DEVICE — CATHETER DIAG 6FR L100CM SPEC CRV STYL LCB DXTERITY

## (undated) DEVICE — MEDI-TRACE CADENCE ADULT, DEFIBRILLATION ELECTRODE -RTS  (10 PR/PK) - PHYSIO-CONTROL: Brand: MEDI-TRACE CADENCE

## (undated) DEVICE — PINNACLE PRECISION ACCESS SYSTEM INTRODUCER SHEATH: Brand: PINNACLE PRECISION ACCESS SYSTEM

## (undated) DEVICE — HEART CATH-SFMC: Brand: MEDLINE INDUSTRIES, INC.

## (undated) DEVICE — CATHETER KIT JL 4 JL5 6 FRX100 CM 110 CM 145 PGTL DXTERITY

## (undated) DEVICE — COVER US PRB W15XL120CM W/ GEL RUBBERBAND TAPE STRP FLD GEN

## (undated) DEVICE — Device